# Patient Record
Sex: MALE | Race: WHITE | NOT HISPANIC OR LATINO | Employment: FULL TIME | URBAN - METROPOLITAN AREA
[De-identification: names, ages, dates, MRNs, and addresses within clinical notes are randomized per-mention and may not be internally consistent; named-entity substitution may affect disease eponyms.]

---

## 2017-03-19 ENCOUNTER — APPOINTMENT (OUTPATIENT)
Dept: RADIOLOGY | Facility: MEDICAL CENTER | Age: 57
DRG: 217 | End: 2017-03-19
Attending: EMERGENCY MEDICINE
Payer: MEDICAID

## 2017-03-19 ENCOUNTER — HOSPITAL ENCOUNTER (INPATIENT)
Facility: MEDICAL CENTER | Age: 57
LOS: 8 days | DRG: 217 | End: 2017-03-27
Attending: EMERGENCY MEDICINE | Admitting: INTERNAL MEDICINE
Payer: MEDICAID

## 2017-03-19 ENCOUNTER — RESOLUTE PROFESSIONAL BILLING HOSPITAL PROF FEE (OUTPATIENT)
Dept: MEDSURG UNIT | Facility: MEDICAL CENTER | Age: 57
End: 2017-03-19
Payer: MEDICAID

## 2017-03-19 DIAGNOSIS — I35.0 SEVERE AORTIC STENOSIS: ICD-10-CM

## 2017-03-19 PROBLEM — R55 SYNCOPE: Status: ACTIVE | Noted: 2017-03-19

## 2017-03-19 PROBLEM — I35.2 AORTIC INSUFFICIENCY WITH AORTIC STENOSIS: Status: ACTIVE | Noted: 2017-03-19

## 2017-03-19 PROBLEM — I71.9 AORTIC ANEURYSM (HCC): Status: ACTIVE | Noted: 2017-03-19

## 2017-03-19 LAB
ABO GROUP BLD: NORMAL
ABO GROUP BLD: NORMAL
ALBUMIN SERPL BCP-MCNC: 4.7 G/DL (ref 3.2–4.9)
ALBUMIN/GLOB SERPL: 1.7 G/DL
ALP SERPL-CCNC: 49 U/L (ref 30–99)
ALT SERPL-CCNC: 34 U/L (ref 2–50)
ANION GAP SERPL CALC-SCNC: 9 MMOL/L (ref 0–11.9)
APPEARANCE UR: CLEAR
AST SERPL-CCNC: 24 U/L (ref 12–45)
BILIRUB SERPL-MCNC: 0.9 MG/DL (ref 0.1–1.5)
BILIRUB UR QL STRIP.AUTO: NEGATIVE
BLD GP AB SCN SERPL QL: NORMAL
BUN SERPL-MCNC: 18 MG/DL (ref 8–22)
CALCIUM SERPL-MCNC: 9.5 MG/DL (ref 8.5–10.5)
CHLORIDE SERPL-SCNC: 105 MMOL/L (ref 96–112)
CO2 SERPL-SCNC: 25 MMOL/L (ref 20–33)
COLOR UR: NORMAL
CREAT SERPL-MCNC: 1.01 MG/DL (ref 0.5–1.4)
EKG IMPRESSION: NORMAL
ERYTHROCYTE [DISTWIDTH] IN BLOOD BY AUTOMATED COUNT: 35.7 FL (ref 35.9–50)
ETHANOL BLD-MCNC: 0 G/DL
GFR SERPL CREATININE-BSD FRML MDRD: >60 ML/MIN/1.73 M 2
GLOBULIN SER CALC-MCNC: 2.8 G/DL (ref 1.9–3.5)
GLUCOSE SERPL-MCNC: 119 MG/DL (ref 65–99)
GLUCOSE UR STRIP.AUTO-MCNC: NEGATIVE MG/DL
HCT VFR BLD AUTO: 49.4 % (ref 42–52)
HGB BLD-MCNC: 16.7 G/DL (ref 14–18)
KETONES UR STRIP.AUTO-MCNC: NEGATIVE MG/DL
LEUKOCYTE ESTERASE UR QL STRIP.AUTO: NEGATIVE
LV EJECT FRACT  99904: 55
LV EJECT FRACT MOD 2C 99903: 51.5
LV EJECT FRACT MOD 4C 99902: 56.1
LV EJECT FRACT MOD BP 99901: 53.96
MAGNESIUM SERPL-MCNC: 1.9 MG/DL (ref 1.5–2.5)
MCH RBC QN AUTO: 28.1 PG (ref 27–33)
MCHC RBC AUTO-ENTMCNC: 33.8 G/DL (ref 33.7–35.3)
MCV RBC AUTO: 83.2 FL (ref 81.4–97.8)
MICRO URNS: NORMAL
NITRITE UR QL STRIP.AUTO: NEGATIVE
PH UR STRIP.AUTO: 7.5 [PH]
PHOSPHATE SERPL-MCNC: 3.2 MG/DL (ref 2.5–4.5)
PLATELET # BLD AUTO: 173 K/UL (ref 164–446)
PMV BLD AUTO: 10.8 FL (ref 9–12.9)
POTASSIUM SERPL-SCNC: 3.9 MMOL/L (ref 3.6–5.5)
PROT SERPL-MCNC: 7.5 G/DL (ref 6–8.2)
PROT UR QL STRIP: NEGATIVE MG/DL
RBC # BLD AUTO: 5.94 M/UL (ref 4.7–6.1)
RBC UR QL AUTO: NEGATIVE
RH BLD: NORMAL
SODIUM SERPL-SCNC: 139 MMOL/L (ref 135–145)
SP GR UR STRIP.AUTO: 1.02
TROPONIN I SERPL-MCNC: 0.02 NG/ML (ref 0–0.04)
TSH SERPL DL<=0.005 MIU/L-ACNC: 1.64 UIU/ML (ref 0.3–3.7)
WBC # BLD AUTO: 5.7 K/UL (ref 4.8–10.8)

## 2017-03-19 PROCEDURE — 700117 HCHG RX CONTRAST REV CODE 255: Performed by: EMERGENCY MEDICINE

## 2017-03-19 PROCEDURE — 80053 COMPREHEN METABOLIC PANEL: CPT

## 2017-03-19 PROCEDURE — 72125 CT NECK SPINE W/O DYE: CPT

## 2017-03-19 PROCEDURE — 83735 ASSAY OF MAGNESIUM: CPT

## 2017-03-19 PROCEDURE — 86850 RBC ANTIBODY SCREEN: CPT

## 2017-03-19 PROCEDURE — 71260 CT THORAX DX C+: CPT

## 2017-03-19 PROCEDURE — 36415 COLL VENOUS BLD VENIPUNCTURE: CPT

## 2017-03-19 PROCEDURE — 86900 BLOOD TYPING SEROLOGIC ABO: CPT

## 2017-03-19 PROCEDURE — A9270 NON-COVERED ITEM OR SERVICE: HCPCS | Performed by: STUDENT IN AN ORGANIZED HEALTH CARE EDUCATION/TRAINING PROGRAM

## 2017-03-19 PROCEDURE — 72131 CT LUMBAR SPINE W/O DYE: CPT

## 2017-03-19 PROCEDURE — 93306 TTE W/DOPPLER COMPLETE: CPT

## 2017-03-19 PROCEDURE — 70450 CT HEAD/BRAIN W/O DYE: CPT

## 2017-03-19 PROCEDURE — 770020 HCHG ROOM/CARE - TELE (206)

## 2017-03-19 PROCEDURE — 80307 DRUG TEST PRSMV CHEM ANLYZR: CPT

## 2017-03-19 PROCEDURE — 99285 EMERGENCY DEPT VISIT HI MDM: CPT

## 2017-03-19 PROCEDURE — 93306 TTE W/DOPPLER COMPLETE: CPT | Mod: 26 | Performed by: INTERNAL MEDICINE

## 2017-03-19 PROCEDURE — 86901 BLOOD TYPING SEROLOGIC RH(D): CPT

## 2017-03-19 PROCEDURE — 700102 HCHG RX REV CODE 250 W/ 637 OVERRIDE(OP): Performed by: STUDENT IN AN ORGANIZED HEALTH CARE EDUCATION/TRAINING PROGRAM

## 2017-03-19 PROCEDURE — 81003 URINALYSIS AUTO W/O SCOPE: CPT

## 2017-03-19 PROCEDURE — 700105 HCHG RX REV CODE 258: Performed by: STUDENT IN AN ORGANIZED HEALTH CARE EDUCATION/TRAINING PROGRAM

## 2017-03-19 PROCEDURE — 84484 ASSAY OF TROPONIN QUANT: CPT

## 2017-03-19 PROCEDURE — 84443 ASSAY THYROID STIM HORMONE: CPT

## 2017-03-19 PROCEDURE — 72128 CT CHEST SPINE W/O DYE: CPT

## 2017-03-19 PROCEDURE — 84100 ASSAY OF PHOSPHORUS: CPT

## 2017-03-19 PROCEDURE — 305948 HCHG GREEN TRAUMA ACT PRE-NOTIFY NO CC

## 2017-03-19 PROCEDURE — 85027 COMPLETE CBC AUTOMATED: CPT

## 2017-03-19 PROCEDURE — 71010 DX-CHEST-LIMITED (1 VIEW): CPT

## 2017-03-19 PROCEDURE — 93005 ELECTROCARDIOGRAM TRACING: CPT | Performed by: EMERGENCY MEDICINE

## 2017-03-19 RX ORDER — ACETAMINOPHEN 325 MG/1
650 TABLET ORAL EVERY 6 HOURS PRN
Status: DISCONTINUED | OUTPATIENT
Start: 2017-03-19 | End: 2017-03-22

## 2017-03-19 RX ORDER — BISACODYL 10 MG
10 SUPPOSITORY, RECTAL RECTAL
Status: DISCONTINUED | OUTPATIENT
Start: 2017-03-20 | End: 2017-03-22

## 2017-03-19 RX ORDER — POLYETHYLENE GLYCOL 3350 17 G/17G
1 POWDER, FOR SOLUTION ORAL
Status: DISCONTINUED | OUTPATIENT
Start: 2017-03-20 | End: 2017-03-22

## 2017-03-19 RX ORDER — AMOXICILLIN 250 MG
2 CAPSULE ORAL 2 TIMES DAILY
Status: DISCONTINUED | OUTPATIENT
Start: 2017-03-20 | End: 2017-03-22

## 2017-03-19 RX ORDER — SODIUM CHLORIDE 9 MG/ML
INJECTION, SOLUTION INTRAVENOUS CONTINUOUS
Status: DISCONTINUED | OUTPATIENT
Start: 2017-03-19 | End: 2017-03-22

## 2017-03-19 RX ORDER — DEXTROSE MONOHYDRATE 25 G/50ML
25 INJECTION, SOLUTION INTRAVENOUS
Status: DISCONTINUED | OUTPATIENT
Start: 2017-03-19 | End: 2017-03-22

## 2017-03-19 RX ADMIN — IOHEXOL 100 ML: 350 INJECTION, SOLUTION INTRAVENOUS at 11:12

## 2017-03-19 RX ADMIN — SODIUM CHLORIDE: 9 INJECTION, SOLUTION INTRAVENOUS at 15:09

## 2017-03-19 RX ADMIN — METOPROLOL TARTRATE 25 MG: 25 TABLET, FILM COATED ORAL at 15:09

## 2017-03-19 RX ADMIN — METOPROLOL TARTRATE 25 MG: 25 TABLET, FILM COATED ORAL at 21:02

## 2017-03-19 ASSESSMENT — PATIENT HEALTH QUESTIONNAIRE - PHQ9
SUM OF ALL RESPONSES TO PHQ QUESTIONS 1-9: 0
2. FEELING DOWN, DEPRESSED, IRRITABLE, OR HOPELESS: NOT AT ALL
SUM OF ALL RESPONSES TO PHQ9 QUESTIONS 1 AND 2: 0
1. LITTLE INTEREST OR PLEASURE IN DOING THINGS: NOT AT ALL

## 2017-03-19 ASSESSMENT — LIFESTYLE VARIABLES
TOTAL SCORE: 0
ON A TYPICAL DAY WHEN YOU DRINK ALCOHOL HOW MANY DRINKS DO YOU HAVE: 0
EVER HAD A DRINK FIRST THING IN THE MORNING TO STEADY YOUR NERVES TO GET RID OF A HANGOVER: NO
HAVE YOU EVER FELT YOU SHOULD CUT DOWN ON YOUR DRINKING: NO
EVER FELT BAD OR GUILTY ABOUT YOUR DRINKING: NO
HAVE PEOPLE ANNOYED YOU BY CRITICIZING YOUR DRINKING: NO
TOTAL SCORE: 0
EVER_SMOKED: NEVER
AVERAGE NUMBER OF DAYS PER WEEK YOU HAVE A DRINK CONTAINING ALCOHOL: 1
ALCOHOL_USE: YES
HOW MANY TIMES IN THE PAST YEAR HAVE YOU HAD 5 OR MORE DRINKS IN A DAY: 0
CONSUMPTION TOTAL: NEGATIVE
TOTAL SCORE: 0

## 2017-03-19 ASSESSMENT — PAIN SCALES - GENERAL
PAINLEVEL_OUTOF10: 0
PAINLEVEL_OUTOF10: 0
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
## 2017-03-19 NOTE — IP AVS SNAPSHOT
" <p align=\"LEFT\"><IMG SRC=\"//[email or web address]$/[email or web address]\" alt=\"Image\" WIDTH=\"50%\" HEIGHT=\"200\" BORDER=\"\"></p>                   Name:Felx Perrin  Medical Record Number:[id number]  CSN: [id number]    YOB: 1960   Age: 56 y.o.  Sex: male  HT:1.778 m (5' 10\") WT: 100 kg (220 lb 7.4 oz)          Admit Date: 3/19/2017     Discharge Date:   Today's Date: 3/27/2017  Attending Doctor:  [facility] [place]*                  Allergies:  Review of patient's allergies indicates no known allergies.          Your appointments     Mar 28, 2017  3:15 PM   Anti-Coag New Patient with [facility] PAV PHARMACIST   [facility]    [address]  [address]  [facility] [id number] [phone]            Apr 04, 2017  9:00 AM   Established Patient with Delmar Salcedo M.D.   [facility] (--)    [address]  [address]  [facility] [address] [phone]           You will be receiving a confirmation call a few days before your appointment from our automated call confirmation system.            Apr 04, 2017 11:20 AM   HOSPITAL FOLLOW UP with Kendell Wilson M.D.   [facility]    [address] [address]  [address] [facility] [id number] [phone]            Jun 26, 2017  3:40 PM   Initial Visit with Michael J Bloch, M.D., [facility] EXAM 1   [facility]  (--)    [address] [facility] [address] [phone]              Follow-up Information     1. Follow up with Janel White M.D. On 4/17/2017.    Specialty:  Cardiac Surgery    Why:  1:00 pm    Contact information    [address]  [address]  [facility] [address] [phone]          2. Follow up with Pcp Pt States None.    Specialty:  Family Medicine        3. Follow up with Pcp Pt States None.    Specialty:  Family Medicine        4. Go to Delmar Salcedo M.D..    Specialty: "  Internal Medicine    Why:  for PCP establishmenet    Contact information    [address]  [address]  [place] [address]  [phone]           Medication List      Take these Medications        Instructions    aspirin 81 MG EC tablet    Take 1 Tab by mouth every day.   Dose:  81 mg       atorvastatin 40 MG Tabs   Commonly known as:  LIPITOR    Take 1 Tab by mouth every bedtime.   Dose:  40 mg       Cholecalciferol 2000 UNIT Tabs    Take 1 Tab by mouth every day.   Dose:  2000 Units        MG Caps    Take 100 mg by mouth every morning.   Dose:  100 mg       furosemide 40 MG Tabs   Commonly known as:  LASIX    Take 1 Tab by mouth every day.   Dose:  40 mg       oxycodone immediate-release 5 MG Tabs   Commonly known as:  ROXICODONE    Take 1 Tab by mouth every 3 hours as needed (Moderate Pain (NRS Pain Scale 4-6; CPOT Pain Scale 3-5)).   Dose:  5 mg       potassium chloride SA 20 MEQ Tbcr   Commonly known as:  Kdur    Take 1 Tab by mouth every day.   Dose:  20 mEq       warfarin 7.5 MG Tabs   Commonly known as:  COUMADIN    Take 1 Tab by mouth COUMADIN-DAILY.   Dose:  7.5 mg

## 2017-03-19 NOTE — IP AVS SNAPSHOT
3/27/2017          Flex Perrin  [address]  [place] [address]    Dear Flex:    [facility] wants to ensure your discharge home is safe and you or your loved ones have had all your questions answered regarding your care after you leave the hospital.    You may receive a telephone call within two days of your discharge.  This call is to make certain you understand your discharge instructions as well as ensure we provided you with the best care possible during your stay with us.     The call will only last approximately 3-5 minutes and will be done by a nurse.    Once again, we want to ensure your discharge home is safe and that you have a clear understanding of any next steps in your care.  If you have any questions or concerns, please do not hesitate to contact us, we are here for you.  Thank you for choosing [facility] for your healthcare needs.    Sincerely,    Monatna Herrera    [facility]
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
## 2017-03-19 NOTE — PROGRESS NOTES
Pt arrived to floor, Tele monitor in place, verified with monitor room, VSS, no c/o pain, A&Ox4, ambulates independently, no strip alarm needed, Pt up to chair, call light within reach, all questions answered.
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
## 2017-03-19 NOTE — IP AVS SNAPSHOT
" Home Care Instructions                                                                                                                  Name:Flex Perrin  Medical Record Number:[id number]  CSN: [id number]    YOB: 1960   Age: 56 y.o.  Sex: male  HT:1.778 m (5' 10\") WT: 100 kg (220 lb 7.4 oz)          Admit Date: 3/19/2017     Discharge Date:   Today's Date: 3/27/2017  Attending Doctor:  [facility] [place]*                  Allergies:  Review of patient's allergies indicates no known allergies.            Discharge Instructions       Discharge Instructions    Discharged to home by car with relative. Discharged via wheelchair, hospital escort: Yes.  Special equipment needed: Not Applicable    Be sure to schedule a follow-up appointment with your primary care doctor or any specialists as instructed.     Discharge Plan:   Influenza Vaccine Indication: Patient Refuses    I understand that a diet low in cholesterol, fat, and sodium is recommended for good health. Unless I have been given specific instructions below for another diet, I accept this instruction as my diet prescription.   Other diet: cardiac    Special Instructions: heart surgery and coumadin    Cardiac Surgery Discharge Instructions/[facility]    Activity:  1. NO driving for 4 weeks after surgery. You may ride as a passenger.  2. NO lifting of any item over 10 lbs (e.g. gallon of milk) for 6 weeks after surgery.  Do not raise both arms above head, only one at a time.  Do not push or pull with your arms.  3. Walk as much as possible! Walk a minimum of 4 times per day. Depending on your fatigue and comfort level, you may walk as much as you wish. There is no maximum.  4. Other physical activities (sex, housework, gardening, etc.) are OK after 4 weeks or after 8 weeks if you want to golf (start by putting, then advance to chipping, then to driving).  5. Continue using incentive spirometer for 2 weeks.  If you are going home on oxygen and " you were not on oxygen prior to surgery, keep using until you are oxygen free.  6. Weigh daily and write it down starting  the next morning after you arrive home. Call your doctor for a weight gain of 2-4 lbs in 1-2 days.    Incision Care:  1. SHOWER EVERYDAY-no baths. Make sure to clean your incision(s) twice daily with plain, perfume and dye-free soap (if you shower in the morning, stand at the sink at bedtime and clean incision with soap and water). Then pat incision(s) dry with clean towel. Avoid creams or lotions on the incision(s).    2. If there is any increase in redness or swelling, or separation of the incision line, or thick drainage* from any of the incisions, call [facility] ([phone]). * Clear, thin drainage is not abnormal especially from the leg incision and/or chest tube sites.                    3. Continue to wear your BALJEET Stockings for 4 weeks on the leg(s) with the incision(s) or swelling. You may take off the stocking(s) when in bed or when the leg(s) are elevated.    General Instructions:  1. If you are on the blood thinner Coumadin (warfarin), you will need your coumadin levels checked periodically.  2. You have been referred to Cardiac Rehab which is highly recommended for you after heart surgery. You can start Cardiac Rehab 30 days after surgery.  If you do not have an appointment at the time of discharge call [phone] to schedule an appointment.  3. Your Primary Care Doctor typically handles Home Oxygen. The Home Oxygen service that drops off your tanks should be checking your oxygen saturation levels. Oxygen may be stopped when you are > 90 % saturated on room air. Check with your Primary Care Doctor if you are unsure.    Prescription Refills/Questions:   Call your usual Pharmacy for ALL refills   1. Pain medication questions only: Call [facility] at [phone].  (Remember that refills may require additional physician approval and will need at least 24 hours’  notice. Please call for refills on Mondays through Fridays from 9 am to 4 pm).  2. For all other medications (except pain medication): Call your Cardiology Group or Primary Care Doctor (not [facility]) for refills or questions.    [facility] IS ALWAYS AVAILABLE TO ANSWER YOUR QUESTIONS. DO NOT HESITATE TO CALL!    · Is patient discharged on Warfarin / Coumadin?   Yes    You are on the blood thinner Coumadin (warfarin) and you will need your coumadin levels checked periodically. For any questions call the [facility] @ [phone]. The home health nurse will draw your blood and the coumadin clinic will call with any change to your dose.      IMPORTANT: HOW TO USE THIS INFORMATION:  This is a summary and does NOT have all possible information about this product. This information does not assure that this product is safe, effective, or appropriate for you. This information is not individual medical advice and does not substitute for the advice of your health care professional. Always ask your health care professional for complete information about this product and your specific health needs.      WARFARIN - ORAL (WARF-uh-rin)      COMMON BRAND NAME(S): Coumadin      WARNING:  Warfarin can cause very serious (possibly fatal) bleeding. This is more likely to occur when you first start taking this medication or if you take too much warfarin. To decrease your risk for bleeding, your doctor or other health care provider will monitor you closely and check your lab results (INR test) to make sure you are not taking too much warfarin. Keep all medical and laboratory appointments. Tell your doctor right away if you notice any signs of serious bleeding. See also Side Effects section.      USES:  This medication is used to treat blood clots (such as in deep vein thrombosis-DVT or pulmonary embolus-PE) and/or to prevent new clots from forming in your body. Preventing harmful blood clots helps to  "reduce the risk of a stroke or heart attack. Conditions that increase your risk of developing blood clots include a certain type of irregular heart rhythm (atrial fibrillation), heart valve replacement, recent heart attack, and certain surgeries (such as hip/knee replacement). Warfarin is commonly called a \"blood thinner,\" but the more correct term is \"anticoagulant.\" It helps to keep blood flowing smoothly in your body by decreasing the amount of certain substances (clotting proteins) in your blood.      HOW TO USE:  Read the Medication Guide provided by your pharmacist before you start taking warfarin and each time you get a refill. If you have any questions, ask your doctor or pharmacist. Take this medication by mouth with or without food as directed by your doctor or other health care professional, usually once a day. It is very important to take it exactly as directed. Do not increase the dose, take it more frequently, or stop using it unless directed by your doctor. Dosage is based on your medical condition, laboratory tests (such as INR), and response to treatment. Your doctor or other health care provider will monitor you closely while you are taking this medication to determine the right dose for you. Use this medication regularly to get the most benefit from it. To help you remember, take it at the same time each day. It is important to eat a balanced, consistent diet while taking warfarin. Some foods can affect how warfarin works in your body and may affect your treatment and dose. Avoid sudden large increases or decreases in your intake of foods high in vitamin K (such as broccoli, cauliflower, cabbage, brussels sprouts, kale, spinach, and other green leafy vegetables, liver, green tea, certain vitamin supplements). If you are trying to lose weight, check with your doctor before you try to go on a diet. Cranberry products may also affect how your warfarin works. Limit the amount of cranberry juice (16 " ounces/480 milliliters a day) or other cranberry products you may drink or eat.      SIDE EFFECTS:  Nausea, loss of appetite, or stomach/abdominal pain may occur. If any of these effects persist or worsen, tell your doctor or pharmacist promptly. Remember that your doctor has prescribed this medication because he or she has judged that the benefit to you is greater than the risk of side effects. Many people using this medication do not have serious side effects. This medication can cause serious bleeding if it affects your blood clotting proteins too much (shown by unusually high INR lab results). Even if your doctor stops your medication, this risk of bleeding can continue for up to a week. Tell your doctor right away if you have any signs of serious bleeding, including: unusual pain/swelling/discomfort, unusual/easy bruising, prolonged bleeding from cuts or gums, persistent/frequent nosebleeds, unusually heavy/prolonged menstrual flow, pink/dark urine, coughing up blood, vomit that is bloody or looks like coffee grounds, severe headache, dizziness/fainting, unusual or persistent tiredness/weakness, bloody/black/tarry stools, chest pain, shortness of breath, difficulty swallowing. Tell your doctor right away if any of these unlikely but serious side effects occur: persistent nausea/vomiting, severe stomach/abdominal pain, yellowing eyes/skin. This drug rarely has caused very serious (possibly fatal) problems if its effects lead to small blood clots (usually at the beginning of treatment). This can lead to severe skin/tissue damage that may require surgery or amputation if left untreated. Patients with certain blood conditions (protein C or S deficiency) may be at greater risk. Get medical help right away if any of these rare but serious side effects occur: painful/red/purplish patches on the skin (such as on the toe, breast, abdomen), change in the amount of urine, vision changes, confusion, slurred speech,  weakness on one side of the body. A very serious allergic reaction to this drug is rare. However, get medical help right away if you notice any symptoms of a serious allergic reaction, including: rash, itching/swelling (especially of the face/tongue/throat), severe dizziness, trouble breathing. This is not a complete list of possible side effects. If you notice other effects not listed above, contact your doctor or pharmacist. In the [place] - Call your doctor for medical advice about side effects. You may report side effects to FDA at 4-855-CIM-5534. In [place] - Call your doctor for medical advice about side effects. You may report side effects to [facility] [place] at 1-984.490.5506.      PRECAUTIONS:  Before taking warfarin, tell your doctor or pharmacist if you are allergic to it; or if you have any other allergies. This product may contain inactive ingredients, which can cause allergic reactions or other problems. Talk to your pharmacist for more details. Before using this medication, tell your doctor or pharmacist your medical history, especially of: blood disorders (such as anemia, hemophilia), bleeding problems (such as bleeding of the stomach/intestines, bleeding in the brain), blood vessel disorders (such as aneurysms), recent major injury/surgery, liver disease, alcohol use, mental/mood disorders (including memory problems), frequent falls/injuries. It is important that all your doctors and dentists know that you take warfarin. Before having surgery or any medical/dental procedures, tell your doctor or dentist that you are taking this medication and about all the products you use (including prescription drugs, nonprescription drugs, and herbal products). Avoid getting injections into the muscles. If you must have an injection into a muscle (for example, a flu shot), it should be given in the arm. This way, it will be easier to check for bleeding and/or apply pressure bandages. This medication may cause stomach  bleeding. Daily use of alcohol while using this medicine will increase your risk for stomach bleeding and may also affect how this medication works. Limit or avoid alcoholic beverages. If you have not been eating well, if you have an illness or infection that causes fever, vomiting, or diarrhea for more than 2 days, or if you start using any antibiotic medications, contact your doctor or pharmacist immediately because these conditions can affect how warfarin works. This medication can cause heavy bleeding. To lower the chance of getting cut, bruised, or injured, use great caution with sharp objects like safety razors and nail cutters. Use an electric razor when shaving and a soft toothbrush when brushing your teeth. Avoid activities such as contact sports. If you fall or injure yourself, especially if you hit your head, call your doctor immediately. Your doctor may need to check you. The Food & Drug Administration has stated that generic warfarin products are interchangeable. However, consult your doctor or pharmacist before switching warfarin products. Be careful not to take more than one medication that contains warfarin unless specifically directed by the doctor or health care provider who is monitoring your warfarin treatment. Older adults may be at greater risk for bleeding while using this drug. This medication is not recommended for use during pregnancy because of serious (possibly fatal) harm to an unborn baby. Discuss the use of reliable forms of birth control with your doctor. If you become pregnant or think you may be pregnant, tell your doctor immediately. If you are planning pregnancy, discuss a plan for managing your condition with your doctor before you become pregnant. Your doctor may switch the type of medication you use during pregnancy. Very small amounts of this medication may pass into breast milk but is unlikely to harm a nursing infant. Consult your doctor before breast-feeding.      DRUG  "INTERACTIONS:  Drug interactions may change how your medications work or increase your risk for serious side effects. This document does not contain all possible drug interactions. Keep a list of all the products you use (including prescription/nonprescription drugs and herbal products) and share it with your doctor and pharmacist. Do not start, stop, or change the dosage of any medicines without your doctor's approval. Warfarin interacts with many prescription, nonprescription, vitamin, and herbal products. This includes medications that are applied to the skin or inside the vagina or rectum. The interactions with warfarin usually result in an increase or decrease in the \"blood-thinning\" (anticoagulant) effect. Your doctor or other health care professional should closely monitor you to prevent serious bleeding or clotting problems. While taking warfarin, it is very important to tell your doctor or pharmacist of any changes in medications, vitamins, or herbal products that you are taking. Some products that may interact with this drug include: capecitabine, imatinib, mifepristone. Aspirin, aspirin-like drugs (salicylates), and nonsteroidal anti-inflammatory drugs (NSAIDs such as ibuprofen, naproxen, celecoxib) may have effects similar to warfarin. These drugs may increase the risk of bleeding problems if taken during treatment with warfarin. Carefully check all prescription/nonprescription product labels (including drugs applied to the skin such as pain-relieving creams) since the products may contain NSAIDs or salicylates. Talk to your doctor about using a different medication (such as acetaminophen) to treat pain/fever. Low-dose aspirin and related drugs (such as clopidogrel, ticlopidine) should be continued if prescribed by your doctor for specific medical reasons such as heart attack or stroke prevention. Consult your doctor or pharmacist for more details. Many herbal products interact with warfarin. Tell your " doctor before taking any herbal products, especially bromelains, coenzyme Q10, cranberry, danshen, dong quai, fenugreek, garlic, ginkgo biloba, ginseng, and Jacqueline's wort, among others. This medication may interfere with a certain laboratory test to measure theophylline levels, possibly causing false test results. Make sure laboratory personnel and all your doctors know you use this drug.      OVERDOSE:  If overdose is suspected, contact a poison control center or emergency room immediately. [place] residents can call the  National Poison Hotline at 1-244.156.8085. [place] residents can call a provincial poison control center. Symptoms of overdose may include: bloody/black/tarry stools, pink/dark urine, unusual/prolonged bleeding.      NOTES:  Do not share this medication with others. Laboratory and/or medical tests (such as INR, complete blood count) must be performed periodically to monitor your progress or check for side effects. Consult your doctor for more details.      MISSED DOSE:  For the best possible benefit, do not miss any doses. If you do miss a dose and remember on the same day, take it as soon as you remember. If you remember on the next day, skip the missed dose and resume your usual dosing schedule. Do not double the dose to catch up because this could increase your risk for bleeding. Keep a record of missed doses to give to your doctor or pharmacist. Contact your doctor or pharmacist if you miss 2 or more doses in a row.      STORAGE:  Store at room temperature away from light and moisture. Do not store in the bathroom. Keep all medications away from children and pets. Do not flush medications down the toilet or pour them into a drain unless instructed to do so. Properly discard this product when it is  or no longer needed. Consult your pharmacist or local waste disposal company for more details about how to safely discard your product.      MEDICAL ALERT:  Your condition and medication can  cause complications in a medical emergency. For information about enrolling in MedicAlert, call 1-280.726.3983 ([place]) or 1-110.384.8779 ([place]).      Information last revised October 2010 Copyright(c) 2010 [employer]      · Is patient Post Blood Transfusion?  No    Depression / Suicide Risk    As you are discharged from this [facility] facility, it is important to learn how to keep safe from harming yourself.    Recognize the warning signs:  · Abrupt changes in personality, positive or negative- including increase in energy   · Giving away possessions  · Change in eating patterns- significant weight changes-  positive or negative  · Change in sleeping patterns- unable to sleep or sleeping all the time   · Unwillingness or inability to communicate  · Depression  · Unusual sadness, discouragement and loneliness  · Talk of wanting to die  · Neglect of personal appearance   · Rebelliousness- reckless behavior  · Withdrawal from people/activities they love  · Confusion- inability to concentrate     If you or a loved one observes any of these behaviors or has concerns about self-harm, here's what you can do:  · Talk about it- your feelings and reasons for harming yourself  · Remove any means that you might use to hurt yourself (examples: pills, rope, extension cords, firearm)  · Get professional help from the community (Mental Health, Substance Abuse, psychological counseling)  · Do not be alone:Call your Safe Contact- someone whom you trust who will be there for you.  · Call your local CRISIS HOTLINE 955-3194 or 106-802-5581  · Call your local Children's Mobile Crisis Response Team [place] (261) 463-9943 or www.[employer]  · Call the toll free National Suicide Prevention Hotlines   · National Suicide Prevention Lifeline 886-737-PJYM (6214)  · National Hope Line Network 800-SUICIDE (387-1399)        Your appointments     Mar 28, 2017  3:15 PM   Anti-Coag New Patient with [facility] PHARMACIST      [facility]    [address]  [address]  [facility] [address] [phone]            Apr 04, 2017  9:00 AM   Established Patient with Delmar Salcedo M.D.   [facility] - Internal Medicine (--)    [address]  [address]  [facility] [id number] [phone]           You will be receiving a confirmation call a few days before your appointment from our automated call confirmation system.            Apr 04, 2017 11:20 AM   HOSPITAL FOLLOW UP with Kendell Wilson M.D.   [facility] (--)    [address] [address]  [address]nd [address]  [facility] [address] [phone]            Jun 26, 2017  3:40 PM   Initial Visit with Michael J Bloch, M.D., IH EXAM 1   [facility]  (--)    [address] [facility] [address]   [phone]              Follow-up Information     1. Follow up with Janel White M.D. On 4/17/2017.    Specialty:  Cardiac Surgery    Why:  1:00 pm    Contact information    [address]  [facility] [address]  [phone]          2. Follow up with Pcp [facility] [place] None.    Specialty:  Family Medicine        3. Follow up with Pcp [facility] [place] None.    Specialty:  Family Medicine        4. Go to Delmar Salcedo M.D..    Specialty:  Internal Medicine    Why:  for PCP establishmenet    Contact information    [address]  [address]  [facility] [id number] [phone]           Discharge Medication Instructions:    Below are the medications your physician expects you to take upon discharge:    Review all your home medications and newly ordered medications with your doctor and/or pharmacist. Follow medication instructions as directed by your doctor and/or pharmacist.    Please keep your medication list with you and share with your physician.               Medication List      START taking these medications        Instructions    aspirin  81 MG EC tablet   Last time this was given:  81 mg on 3/27/2017  8:31 AM    Take 1 Tab by mouth every day.   Dose:  81 mg       atorvastatin 40 MG Tabs   Last time this was given:  40 mg on 3/26/2017  8:19 PM   Commonly known as:  LIPITOR    Take 1 Tab by mouth every bedtime.   Dose:  40 mg       Cholecalciferol 2000 UNIT Tabs   Last time this was given:  2,000 Units on 3/27/2017  8:31 AM    Take 1 Tab by mouth every day.   Dose:  2000 Units        MG Caps   Last time this was given:  100 mg on 3/26/2017  8:02 AM    Take 100 mg by mouth every morning.   Dose:  100 mg       furosemide 40 MG Tabs   Commonly known as:  LASIX    Take 1 Tab by mouth every day.   Dose:  40 mg       oxycodone immediate-release 5 MG Tabs   Last time this was given:  5 mg on 3/27/2017  4:19 AM   Commonly known as:  ROXICODONE    Take 1 Tab by mouth every 3 hours as needed (Moderate Pain (NRS Pain Scale 4-6; CPOT Pain Scale 3-5)).   Dose:  5 mg       potassium chloride SA 20 MEQ Tbcr   Last time this was given:  20 mEq on 3/27/2017  8:31 AM   Commonly known as:  Kdur    Take 1 Tab by mouth every day.   Dose:  20 mEq       warfarin 7.5 MG Tabs   Last time this was given:  7.5 mg on 3/26/2017  5:35 PM   Commonly known as:  COUMADIN    Take 1 Tab by mouth COUMADIN-DAILY.   Dose:  7.5 mg               Instructions           Diet / Nutrition:    Follow any diet instructions given to you by your doctor or the dietician, including how much salt (sodium) you are allowed each day.    If you are overweight, talk to your doctor about a weight reduction plan.    Activity:    Remain physically active following your doctor's instructions about exercise and activity.    Rest often.     Any time you become even a little tired or short of breath, SIT DOWN and rest.    Worsening Symptoms:    Report any of the following signs and symptoms to the doctor's office immediately:    *Pain of jaw, arm, or neck  *Chest pain not relieved by medication                                *Dizziness or loss of consciousness  *Difficulty breathing even when at rest   *More tired than usual                                       *Bleeding drainage or swelling of surgical site  *Swelling of feet, ankles, legs or stomach                 *Fever (>100ºF)  *Pink or blood tinged sputum  *Weight gain (3lbs/day or 5lbs /week)           *Shock from internal defibrillator (if applicable)  *Palpitations or irregular heartbeats                *Cool and/or numb extremities    Stroke Awareness    Common Risk Factors for Stroke include:    Age  Atrial Fibrillation  Carotid Artery Stenosis  Diabetes Mellitus  Excessive alcohol consumption  High blood pressure  Overweight   Physical inactivity  Smoking    Warning signs and symptoms of a stroke include:    *Sudden numbness or weakness of the face, arm or leg (especially on one side of the body).  *Sudden confusion, trouble speaking or understanding.  *Sudden trouble seeing in one or both eyes.  *Sudden trouble walking, dizziness, loss of balance or coordination.Sudden severe headache with no known cause.    It is very important to get treatment quickly when a stroke occurs. If you experience any of the above warning signs, call 911 immediately.                   Disclaimer         Quit Smoking / Tobacco Use:    I understand the use of any tobacco products increases my chance of suffering from future heart disease or stroke and could cause other illnesses which may shorten my life. Quitting the use of tobacco products is the single most important thing I can do to improve my health. For further information on smoking / tobacco cessation call a Toll Free Quit Line at 1-882.166.9135 (*[employer]) or 1-374.756.4928 (American Lung Association) or you can access the web based program at www.lungusa.org.    [place] Tobacco Users Help Line:  (944) 258-7117       Toll Free: 1-152.839.1987  Quit Tobacco Program [facility]  [phone]    Crisis Hotline:    [place] Crisis Hotline:  4-768-VSOIARS or 1-482.623.6396    [place] Crisis Hotline:    1-922.951.9069 or 185-177-8605    Discharge Survey:   Thank you for choosing [facility]. We hope we did everything we could to make your hospital stay a pleasant one. You may be receiving a phone survey and we would appreciate your time and participation in answering the questions. Your input is very valuable to us in our efforts to improve our service to our patients and their families.        My signature on this form indicates that:    1. I have reviewed and understand the above information.  2. My questions regarding this information have been answered to my satisfaction.  3. I have formulated a plan with my discharge nurse to obtain my prescribed medications for home.                  Disclaimer         __________________________________                     __________       ________                       Patient Signature                                                 Date                    Time

## 2017-03-20 PROBLEM — I71.20 THORACIC AORTIC ANEURYSM WITHOUT RUPTURE (HCC): Status: ACTIVE | Noted: 2017-03-19
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
## 2017-03-20 LAB
ALBUMIN SERPL BCP-MCNC: 4 G/DL (ref 3.2–4.9)
ALBUMIN/GLOB SERPL: 1.9 G/DL
ALP SERPL-CCNC: 44 U/L (ref 30–99)
ALT SERPL-CCNC: 26 U/L (ref 2–50)
ANION GAP SERPL CALC-SCNC: 6 MMOL/L (ref 0–11.9)
AST SERPL-CCNC: 18 U/L (ref 12–45)
BASOPHILS # BLD AUTO: 1 % (ref 0–1.8)
BASOPHILS # BLD: 0.06 K/UL (ref 0–0.12)
BILIRUB SERPL-MCNC: 0.8 MG/DL (ref 0.1–1.5)
BUN SERPL-MCNC: 17 MG/DL (ref 8–22)
CALCIUM SERPL-MCNC: 8.7 MG/DL (ref 8.5–10.5)
CHLORIDE SERPL-SCNC: 109 MMOL/L (ref 96–112)
CHOLEST SERPL-MCNC: 105 MG/DL (ref 100–199)
CO2 SERPL-SCNC: 23 MMOL/L (ref 20–33)
CREAT SERPL-MCNC: 0.83 MG/DL (ref 0.5–1.4)
EKG IMPRESSION: NORMAL
EOSINOPHIL # BLD AUTO: 0.12 K/UL (ref 0–0.51)
EOSINOPHIL NFR BLD: 1.9 % (ref 0–6.9)
ERYTHROCYTE [DISTWIDTH] IN BLOOD BY AUTOMATED COUNT: 36.4 FL (ref 35.9–50)
EST. AVERAGE GLUCOSE BLD GHB EST-MCNC: 105 MG/DL
GFR SERPL CREATININE-BSD FRML MDRD: >60 ML/MIN/1.73 M 2
GLOBULIN SER CALC-MCNC: 2.1 G/DL (ref 1.9–3.5)
GLUCOSE SERPL-MCNC: 100 MG/DL (ref 65–99)
HBA1C MFR BLD: 5.3 % (ref 0–5.6)
HCT VFR BLD AUTO: 45.5 % (ref 42–52)
HDLC SERPL-MCNC: 22 MG/DL
HGB BLD-MCNC: 15.3 G/DL (ref 14–18)
IMM GRANULOCYTES # BLD AUTO: 0.02 K/UL (ref 0–0.11)
IMM GRANULOCYTES NFR BLD AUTO: 0.3 % (ref 0–0.9)
INR PPP: 1.17 (ref 0.87–1.13)
LDLC SERPL CALC-MCNC: 68 MG/DL
LYMPHOCYTES # BLD AUTO: 1.51 K/UL (ref 1–4.8)
LYMPHOCYTES NFR BLD: 24.3 % (ref 22–41)
MAGNESIUM SERPL-MCNC: 2.1 MG/DL (ref 1.5–2.5)
MCH RBC QN AUTO: 28.1 PG (ref 27–33)
MCHC RBC AUTO-ENTMCNC: 33.6 G/DL (ref 33.7–35.3)
MCV RBC AUTO: 83.5 FL (ref 81.4–97.8)
MONOCYTES # BLD AUTO: 0.53 K/UL (ref 0–0.85)
MONOCYTES NFR BLD AUTO: 8.5 % (ref 0–13.4)
NEUTROPHILS # BLD AUTO: 3.98 K/UL (ref 1.82–7.42)
NEUTROPHILS NFR BLD: 64 % (ref 44–72)
NRBC # BLD AUTO: 0 K/UL
NRBC BLD AUTO-RTO: 0 /100 WBC
PLATELET # BLD AUTO: 164 K/UL (ref 164–446)
PMV BLD AUTO: 11.2 FL (ref 9–12.9)
POTASSIUM SERPL-SCNC: 4 MMOL/L (ref 3.6–5.5)
PROT SERPL-MCNC: 6.1 G/DL (ref 6–8.2)
PROTHROMBIN TIME: 15.3 SEC (ref 12–14.6)
RBC # BLD AUTO: 5.45 M/UL (ref 4.7–6.1)
SODIUM SERPL-SCNC: 138 MMOL/L (ref 135–145)
TRIGL SERPL-MCNC: 76 MG/DL (ref 0–149)
WBC # BLD AUTO: 6.2 K/UL (ref 4.8–10.8)

## 2017-03-20 PROCEDURE — 770020 HCHG ROOM/CARE - TELE (206)

## 2017-03-20 PROCEDURE — 36415 COLL VENOUS BLD VENIPUNCTURE: CPT

## 2017-03-20 PROCEDURE — 83735 ASSAY OF MAGNESIUM: CPT

## 2017-03-20 PROCEDURE — C1769 GUIDE WIRE: HCPCS

## 2017-03-20 PROCEDURE — 700101 HCHG RX REV CODE 250

## 2017-03-20 PROCEDURE — 307093 HCHG TR BAND RADIAL

## 2017-03-20 PROCEDURE — 4A023N7 MEASUREMENT OF CARDIAC SAMPLING AND PRESSURE, LEFT HEART, PERCUTANEOUS APPROACH: ICD-10-PCS | Performed by: INTERNAL MEDICINE

## 2017-03-20 PROCEDURE — 83036 HEMOGLOBIN GLYCOSYLATED A1C: CPT

## 2017-03-20 PROCEDURE — 99223 1ST HOSP IP/OBS HIGH 75: CPT | Mod: GC | Performed by: INTERNAL MEDICINE

## 2017-03-20 PROCEDURE — 700111 HCHG RX REV CODE 636 W/ 250 OVERRIDE (IP): Performed by: STUDENT IN AN ORGANIZED HEALTH CARE EDUCATION/TRAINING PROGRAM

## 2017-03-20 PROCEDURE — 700105 HCHG RX REV CODE 258: Performed by: INTERNAL MEDICINE

## 2017-03-20 PROCEDURE — 85025 COMPLETE CBC W/AUTO DIFF WBC: CPT

## 2017-03-20 PROCEDURE — 80053 COMPREHEN METABOLIC PANEL: CPT

## 2017-03-20 PROCEDURE — 360979 HCHG DIAGNOSTIC CATH

## 2017-03-20 PROCEDURE — 99152 MOD SED SAME PHYS/QHP 5/>YRS: CPT

## 2017-03-20 PROCEDURE — 700105 HCHG RX REV CODE 258: Performed by: STUDENT IN AN ORGANIZED HEALTH CARE EDUCATION/TRAINING PROGRAM

## 2017-03-20 PROCEDURE — 700117 HCHG RX CONTRAST REV CODE 255: Performed by: INTERNAL MEDICINE

## 2017-03-20 PROCEDURE — 93005 ELECTROCARDIOGRAM TRACING: CPT | Performed by: STUDENT IN AN ORGANIZED HEALTH CARE EDUCATION/TRAINING PROGRAM

## 2017-03-20 PROCEDURE — 700111 HCHG RX REV CODE 636 W/ 250 OVERRIDE (IP)

## 2017-03-20 PROCEDURE — B3101ZZ FLUOROSCOPY OF THORACIC AORTA USING LOW OSMOLAR CONTRAST: ICD-10-PCS | Performed by: INTERNAL MEDICINE

## 2017-03-20 PROCEDURE — 304952 HCHG R 2 PADS

## 2017-03-20 PROCEDURE — A9270 NON-COVERED ITEM OR SERVICE: HCPCS | Performed by: STUDENT IN AN ORGANIZED HEALTH CARE EDUCATION/TRAINING PROGRAM

## 2017-03-20 PROCEDURE — 700102 HCHG RX REV CODE 250 W/ 637 OVERRIDE(OP): Performed by: INTERNAL MEDICINE

## 2017-03-20 PROCEDURE — 99153 MOD SED SAME PHYS/QHP EA: CPT

## 2017-03-20 PROCEDURE — 700102 HCHG RX REV CODE 250 W/ 637 OVERRIDE(OP): Performed by: STUDENT IN AN ORGANIZED HEALTH CARE EDUCATION/TRAINING PROGRAM

## 2017-03-20 PROCEDURE — 93880 EXTRACRANIAL BILAT STUDY: CPT

## 2017-03-20 PROCEDURE — B2111ZZ FLUOROSCOPY OF MULTIPLE CORONARY ARTERIES USING LOW OSMOLAR CONTRAST: ICD-10-PCS | Performed by: INTERNAL MEDICINE

## 2017-03-20 PROCEDURE — C1894 INTRO/SHEATH, NON-LASER: HCPCS

## 2017-03-20 PROCEDURE — A9270 NON-COVERED ITEM OR SERVICE: HCPCS | Performed by: INTERNAL MEDICINE

## 2017-03-20 PROCEDURE — 93567 NJX CAR CTH SPRVLV AORTGRPHY: CPT

## 2017-03-20 PROCEDURE — 93010 ELECTROCARDIOGRAM REPORT: CPT | Performed by: INTERNAL MEDICINE

## 2017-03-20 PROCEDURE — 80061 LIPID PANEL: CPT

## 2017-03-20 PROCEDURE — 85610 PROTHROMBIN TIME: CPT

## 2017-03-20 PROCEDURE — 93458 L HRT ARTERY/VENTRICLE ANGIO: CPT

## 2017-03-20 PROCEDURE — B2151ZZ FLUOROSCOPY OF LEFT HEART USING LOW OSMOLAR CONTRAST: ICD-10-PCS | Performed by: INTERNAL MEDICINE

## 2017-03-20 RX ORDER — HEPARIN SODIUM,PORCINE 1000/ML
VIAL (ML) INJECTION
Status: COMPLETED
Start: 2017-03-20 | End: 2017-03-20

## 2017-03-20 RX ORDER — LIDOCAINE HYDROCHLORIDE 20 MG/ML
INJECTION, SOLUTION INFILTRATION; PERINEURAL
Status: COMPLETED
Start: 2017-03-20 | End: 2017-03-20

## 2017-03-20 RX ORDER — VERAPAMIL HYDROCHLORIDE 2.5 MG/ML
INJECTION, SOLUTION INTRAVENOUS
Status: COMPLETED
Start: 2017-03-20 | End: 2017-03-20

## 2017-03-20 RX ORDER — MIDAZOLAM HYDROCHLORIDE 1 MG/ML
INJECTION INTRAMUSCULAR; INTRAVENOUS
Status: COMPLETED
Start: 2017-03-20 | End: 2017-03-20

## 2017-03-20 RX ORDER — SODIUM CHLORIDE 9 MG/ML
INJECTION, SOLUTION INTRAVENOUS CONTINUOUS
Status: DISPENSED | OUTPATIENT
Start: 2017-03-20 | End: 2017-03-20

## 2017-03-20 RX ADMIN — MIDAZOLAM 2 MG: 1 INJECTION INTRAMUSCULAR; INTRAVENOUS at 10:04

## 2017-03-20 RX ADMIN — IOHEXOL 142 ML: 350 INJECTION, SOLUTION INTRAVENOUS at 10:16

## 2017-03-20 RX ADMIN — LIDOCAINE HYDROCHLORIDE: 20 INJECTION, SOLUTION INFILTRATION; PERINEURAL at 09:42

## 2017-03-20 RX ADMIN — STANDARDIZED SENNA CONCENTRATE AND DOCUSATE SODIUM 2 TABLET: 8.6; 5 TABLET, FILM COATED ORAL at 20:08

## 2017-03-20 RX ADMIN — MAGNESIUM SULFATE IN DEXTROSE 1 G: 10 INJECTION, SOLUTION INTRAVENOUS at 11:04

## 2017-03-20 RX ADMIN — SODIUM CHLORIDE: 9 INJECTION, SOLUTION INTRAVENOUS at 20:08

## 2017-03-20 RX ADMIN — SODIUM CHLORIDE: 9 INJECTION, SOLUTION INTRAVENOUS at 05:13

## 2017-03-20 RX ADMIN — FENTANYL CITRATE 75 MCG: 50 INJECTION, SOLUTION INTRAMUSCULAR; INTRAVENOUS at 10:05

## 2017-03-20 RX ADMIN — METOPROLOL TARTRATE 25 MG: 25 TABLET, FILM COATED ORAL at 20:08

## 2017-03-20 RX ADMIN — METOPROLOL TARTRATE 25 MG: 25 TABLET, FILM COATED ORAL at 11:07

## 2017-03-20 RX ADMIN — SODIUM CHLORIDE: 9 INJECTION, SOLUTION INTRAVENOUS at 11:05

## 2017-03-20 RX ADMIN — VERAPAMIL HYDROCHLORIDE 5 MG: 2.5 INJECTION, SOLUTION INTRAVENOUS at 09:42

## 2017-03-20 RX ADMIN — NITROGLYCERIN 10 ML: 20 INJECTION INTRAVENOUS at 09:42

## 2017-03-20 RX ADMIN — HEPARIN SODIUM 2000 UNITS: 200 INJECTION, SOLUTION INTRAVENOUS at 09:30

## 2017-03-20 RX ADMIN — HEPARIN SODIUM: 1000 INJECTION, SOLUTION INTRAVENOUS; SUBCUTANEOUS at 09:42

## 2017-03-20 ASSESSMENT — ENCOUNTER SYMPTOMS
BRUISES/BLEEDS EASILY: 0
CHILLS: 0
TINGLING: 1
DOUBLE VISION: 0
LOSS OF CONSCIOUSNESS: 0
PALPITATIONS: 0
HEADACHES: 0
NAUSEA: 0
SHORTNESS OF BREATH: 0
MYALGIAS: 0
BACK PAIN: 0
BLURRED VISION: 0
DIZZINESS: 0
VOMITING: 0
DEPRESSION: 0
COUGH: 0
FEVER: 0
FOCAL WEAKNESS: 0
HEARTBURN: 0
WEAKNESS: 0
FLANK PAIN: 0

## 2017-03-20 ASSESSMENT — PAIN SCALES - GENERAL
PAINLEVEL_OUTOF10: 0

## 2017-03-20 ASSESSMENT — COPD QUESTIONNAIRES
DURING THE PAST 4 WEEKS HOW MUCH DID YOU FEEL SHORT OF BREATH: NONE/LITTLE OF THE TIME
COPD SCREENING SCORE: 1
DO YOU EVER COUGH UP ANY MUCUS OR PHLEGM?: NO/ONLY WITH OCCASIONAL COLDS OR INFECTIONS
HAVE YOU SMOKED AT LEAST 100 CIGARETTES IN YOUR ENTIRE LIFE: NO/DON'T KNOW

## 2017-03-20 ASSESSMENT — LIFESTYLE VARIABLES
SUBSTANCE_ABUSE: 0
EVER_SMOKED: NEVER

## 2017-03-20 NOTE — PROGRESS NOTES
Writer received report at bedside.  Pt. In bed, no apparent distress noted, call light within reach and bed in lowest position and  locks on.   Pt. Alert and verbal, VSS, denies pain; however,encouraged to call for anything.
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
## 2017-03-20 NOTE — PROGRESS NOTES
Pt to cath lab, pt placed on Zoll, consent in chart, EKG finished, IV SL. Dressing changed, flushes well

## 2017-03-20 NOTE — CARE PLAN
Problem: Venous Thromboembolism (VTW)/Deep Vein Thrombosis (DVT) Prevention:  Goal: Patient will participate in Venous Thrombosis (VTE)/Deep Vein Thrombosis (DVT)Prevention Measures  Intervention: Assess and monitor for anticoagulation complications  Encouraged to ambulate and do leg exercise in bed      Problem: Discharge Barriers/Planning  Goal: Patient’s continuum of care needs will be met  Outcome: PROGRESSING SLOWER THAN EXPECTED  Pt is aware he needs to stay until probably Thursday, surgery time is wed at 1pm  Intervention: Assess potential discharge barriers on admission and throughout hospital stay  Aware what could happen if he were to leave too early in his comndition  Intervention: Involve patient and significant other/support system in setting and prioritizing goals for hospital stay and discharge  Family in room most of day

## 2017-03-20 NOTE — PROGRESS NOTES
Bed in low position, call light within reach, pt oriented to room, IVF running, pt aware of plan today, NPO since mn
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
## 2017-03-20 NOTE — PROGRESS NOTES
Patient seen and films reviewed. Patient needs ascending aortic aneurysm repair, AVR w/ JAIRON possible aortic root replacement. Surgery has been scheduled for 1 pm Wednesday. Full consult to follow.
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
## 2017-03-20 NOTE — PROGRESS NOTES
Skin check completed, no skin breakdown noted.  psoriasis noted on sacrum, pt aware and knew about previous to admission.  2nd RN Maverick verified skin

## 2017-03-20 NOTE — CATH LAB
Immediate Post-Operative Note      PreOp Diagnosis: severe aortic stenosis. Ascending aortic aneurysm    PostOp Diagnosis: see below    Procedure(s) :  Coronary Angiography, Left Heart Catheterization, Left Ventriculography, Ascending Aortogram    Surgeon(s):  Carlos Jj M.D.    Type of Anesthesia: Moderate Sedation    Specimen: None    Findings:   EF 55%  Pullback aortic valve gradient: 100 mmHg  Patent coronary arteries  Ascending aorta 6.4 cm    Complications: none    Plan: CT surgery consultation for AVR and AAA repair      Carlos Jj M.D.  3/20/2017 10:26 AM

## 2017-03-20 NOTE — PROGRESS NOTES
[facility] Internal Medicine Interval Note    Name Flex Perrin       1960   Age/Sex 56 y.o. male   MRN [id number]   Code Status Full Code     After 5PM or if no immediate response to page, please call for cross-coverage  Attending/Team: Norah Perrin Call [phone] to page   1st Call - Day Intern (R1):   Jef Cho 2nd Call - Day Sr. Resident (R2/R3):   Erik     Chief complaint/ reason for interval visit  Syncope, severe aortic stenosis, large thoracic aortic aneurysm    Interval Problem Update  Sinus nicole, hypotension, MS prolong, beta blockade with vitals at goals set by cardiology  Active Hospital Problems    Diagnosis   • Syncope [R55] Assist with ambulation   • Thoracic aortic aneurysm without rupture (CMS-HCC) [I71.2] Ascending Aortogram   • Severe aortic stenosis [I35.0] Coronary Angiography, Left Heart Catheterization,   • Aortic insufficiency with aortic stenosis [I35.2]   CT surgery consultation for AVR and AAA repair    Review of Systems   Constitutional: Negative for fever and chills.   Eyes: Negative for blurred vision and double vision.   Respiratory: Negative for cough and shortness of breath.    Cardiovascular: Negative for chest pain, palpitations and leg swelling.   Gastrointestinal: Negative for heartburn, nausea and vomiting.   Genitourinary: Negative for dysuria.   Musculoskeletal: Negative for myalgias.   Skin: Negative for rash.   Neurological: Negative for dizziness, loss of consciousness and headaches.   Endo/Heme/Allergies: Does not bruise/bleed easily.   Psychiatric/Behavioral: Negative for depression.     Consultants/Specialty  Cardiothoracic surgery  Cardiology    Disposition  Inpatient    Quality Measures  EKG reviewed, Labs reviewed, Medications reviewed and Radiology images reviewed  Bradley catheter: No Bradley        DVT prophylaxis - mechanical: SCDs          Physical Exam       Filed Vitals:    17 1600 17 2100 17 0000 17 0400   BP:  140/74 103/28 93/52 106/69   Pulse: 68 62 60 64   Temp: 36.8 °C (98.2 °F) 36.8 °C (98.2 °F) 37.7 °C (99.9 °F) 36.5 °C (97.7 °F)   Resp: 17 17 18 18   Weight:       SpO2: 97% 96% 92% 95%     There is no height on file to calculate BMI.  Oxygen Therapy:  Pulse Oximetry: 95 %, O2 (LPM): 0, O2 Delivery: None (Room Air)    Physical Exam   Constitutional: He is oriented to person, place, and time and well-developed, well-nourished, and in no distress. No distress.   HENT:   Head: Normocephalic and atraumatic.   Eyes: Pupils are equal, round, and reactive to light.   Neck: No JVD present.   Cardiovascular: Normal rate and regular rhythm.  Exam reveals no gallop and no friction rub.    Murmur heard.  4/6 systolic murmer loudest at RUSB   Pulmonary/Chest: Effort normal. No respiratory distress. He has no wheezes.   Abdominal: He exhibits no distension. There is no tenderness. There is no rebound.   Musculoskeletal: He exhibits no edema.   2+ radial and pedal pulses bilaterally   Neurological: He is alert and oriented to person, place, and time.   Skin: Skin is warm and dry. He is not diaphoretic.   Psychiatric: Affect normal.         Lab Data Review:  Recent Results (from the past 24 hour(s))   EKG (ER)    Collection Time: 17  9:44 AM   Result Value Ref Range    Report       [facility] Emergency Dept[facility]    Test Date:  2017  Pt Name:    IMAGE SIXTY                  Department: ER  MRN:        [id number]                      Room:       T733  Gender:     M                            Technician: [id number]  :                                     Requested By:RAJIV FRAUSTO  Order #:    [id number]                    Reading MD: RAJIV FRAUSTO, DO    Measurements  Intervals                                Axis  Rate:       70                           P:          56  NV:         188                          QRS:        -27  QRSD:       96                           T:          132  QT:          416  QTc:        449    Interpretive Statements  SINUS RHYTHM  LEFT ATRIAL ABNORMALITY  LVH WITH SECONDARY REPOLARIZATION ABNORMALITY  ANTERIOR Q WAVES, POSSIBLY DUE TO LVH  No previous ECG available for comparison    Electronically Signed On 3- 15:52:17 PDT by RAJIV FRAUSTO DO     DIAGNOSTIC ALCOHOL    Collection Time: 03/19/17  9:45 AM   Result Value Ref Range    Diagnostic Alcohol 0.00 0.00 g/dL   CBC WITHOUT DIFFERENTIAL    Collection Time: 03/19/17  9:45 AM   Result Value Ref Range    WBC 5.7 4.8 - 10.8 K/uL    RBC 5.94 4.70 - 6.10 M/uL    Hemoglobin 16.7 14.0 - 18.0 g/dL    Hematocrit 49.4 42.0 - 52.0 %    MCV 83.2 81.4 - 97.8 fL    MCH 28.1 27.0 - 33.0 pg    MCHC 33.8 33.7 - 35.3 g/dL    RDW 35.7 (L) 35.9 - 50.0 fL    Platelet Count 173 164 - 446 K/uL    MPV 10.8 9.0 - 12.9 fL   COMP METABOLIC PANEL    Collection Time: 03/19/17  9:45 AM   Result Value Ref Range    Sodium 139 135 - 145 mmol/L    Potassium 3.9 3.6 - 5.5 mmol/L    Chloride 105 96 - 112 mmol/L    Co2 25 20 - 33 mmol/L    Anion Gap 9.0 0.0 - 11.9    Glucose 119 (H) 65 - 99 mg/dL    Bun 18 8 - 22 mg/dL    Creatinine 1.01 0.50 - 1.40 mg/dL    Calcium 9.5 8.5 - 10.5 mg/dL    AST(SGOT) 24 12 - 45 U/L    ALT(SGPT) 34 2 - 50 U/L    Alkaline Phosphatase 49 30 - 99 U/L    Total Bilirubin 0.9 0.1 - 1.5 mg/dL    Albumin 4.7 3.2 - 4.9 g/dL    Total Protein 7.5 6.0 - 8.2 g/dL    Globulin 2.8 1.9 - 3.5 g/dL    A-G Ratio 1.7 g/dL   TROPONIN    Collection Time: 03/19/17  9:45 AM   Result Value Ref Range    Troponin I 0.02 0.00 - 0.04 ng/mL   PHOSPHORUS    Collection Time: 03/19/17  9:45 AM   Result Value Ref Range    Phosphorus 3.2 2.5 - 4.5 mg/dL   MAGNESIUM    Collection Time: 03/19/17  9:45 AM   Result Value Ref Range    Magnesium 1.9 1.5 - 2.5 mg/dL   COD (ADULT)    Collection Time: 03/19/17  9:45 AM   Result Value Ref Range    ABO Grouping Only A     Rh Grouping Only NEG     Antibody Screen-Cod NEG    ESTIMATED GFR    Collection Time:  03/19/17  9:45 AM   Result Value Ref Range    GFR If African American >60 >60 mL/min/1.73 m 2    GFR If Non African American >60 >60 mL/min/1.73 m 2   TSH    Collection Time: 03/19/17  9:45 AM   Result Value Ref Range    TSH 1.640 0.300 - 3.700 uIU/mL   ECHOCARDIOGRAM COMP W/O CONT    Collection Time: 03/19/17  1:08 PM   Result Value Ref Range    Eject.Frac. MOD BP 53.96     Eject.Frac. MOD 4C 56.1     Eject.Frac. MOD 2C 51.5     Left Ventrical Ejection Fraction 55    ABO CONFIRMATION    Collection Time: 03/19/17  3:12 PM   Result Value Ref Range    Second ABO Typing With Cod A    Urinalysis    Collection Time: 03/19/17  4:11 PM   Result Value Ref Range    Color Lt. Yellow     Character Clear     Specific Gravity 1.025 <1.035    Ph 7.5 5.0-8.0    Glucose Negative Negative mg/dL    Ketones Negative Negative mg/dL    Protein Negative Negative mg/dL    Bilirubin Negative Negative    Nitrite Negative Negative    Leukocyte Esterase Negative Negative    Occult Blood Negative Negative    Micro Urine Req see below    CBC with Differential    Collection Time: 03/20/17  3:34 AM   Result Value Ref Range    WBC 6.2 4.8 - 10.8 K/uL    RBC 5.45 4.70 - 6.10 M/uL    Hemoglobin 15.3 14.0 - 18.0 g/dL    Hematocrit 45.5 42.0 - 52.0 %    MCV 83.5 81.4 - 97.8 fL    MCH 28.1 27.0 - 33.0 pg    MCHC 33.6 (L) 33.7 - 35.3 g/dL    RDW 36.4 35.9 - 50.0 fL    Platelet Count 164 164 - 446 K/uL    MPV 11.2 9.0 - 12.9 fL    Neutrophils-Polys 64.00 44.00 - 72.00 %    Lymphocytes 24.30 22.00 - 41.00 %    Monocytes 8.50 0.00 - 13.40 %    Eosinophils 1.90 0.00 - 6.90 %    Basophils 1.00 0.00 - 1.80 %    Immature Granulocytes 0.30 0.00 - 0.90 %    Nucleated RBC 0.00 /100 WBC    Neutrophils (Absolute) 3.98 1.82 - 7.42 K/uL    Lymphs (Absolute) 1.51 1.00 - 4.80 K/uL    Monos (Absolute) 0.53 0.00 - 0.85 K/uL    Eos (Absolute) 0.12 0.00 - 0.51 K/uL    Baso (Absolute) 0.06 0.00 - 0.12 K/uL    Immature Granulocytes (abs) 0.02 0.00 - 0.11 K/uL    NRBC  (Absolute) 0.00 K/uL   Comp Metabolic Panel (CMP)    Collection Time: 03/20/17  3:34 AM   Result Value Ref Range    Sodium 138 135 - 145 mmol/L    Potassium 4.0 3.6 - 5.5 mmol/L    Chloride 109 96 - 112 mmol/L    Co2 23 20 - 33 mmol/L    Anion Gap 6.0 0.0 - 11.9    Glucose 100 (H) 65 - 99 mg/dL    Bun 17 8 - 22 mg/dL    Creatinine 0.83 0.50 - 1.40 mg/dL    Calcium 8.7 8.5 - 10.5 mg/dL    AST(SGOT) 18 12 - 45 U/L    ALT(SGPT) 26 2 - 50 U/L    Alkaline Phosphatase 44 30 - 99 U/L    Total Bilirubin 0.8 0.1 - 1.5 mg/dL    Albumin 4.0 3.2 - 4.9 g/dL    Total Protein 6.1 6.0 - 8.2 g/dL    Globulin 2.1 1.9 - 3.5 g/dL    A-G Ratio 1.9 g/dL   Lipid Profile (Lipid Panel) Fasting    Collection Time: 03/20/17  3:34 AM   Result Value Ref Range    Cholesterol,Tot 105 100 - 199 mg/dL    Triglycerides 76 0 - 149 mg/dL    HDL 22 (A) >=40 mg/dL    LDL 68 <100 mg/dL   ESTIMATED GFR    Collection Time: 03/20/17  3:34 AM   Result Value Ref Range    GFR If African American >60 >60 mL/min/1.73 m 2    GFR If Non African American >60 >60 mL/min/1.73 m 2     Active Hospital Problems    Diagnosis   • Syncope [R55]   • Thoracic aortic aneurysm without rupture (CMS-HCC) [I71.2]   • Severe aortic stenosis [I35.0]   • Aortic insufficiency with aortic stenosis [I35.2]     ASSESSMENT BY PROBLEM    1. Syncope - Secondary to below    2. Severe aortic stenosis: Calcific, possible congenital bicuspid given relatively young age, Left heart cath today. No history of rheumatic heart disease. No prior symptoms of angina or heart failure, presented with first episode of syncope. Symptomatic severe AS therefore needs AVR, cardiology and CTS on board.   - AVR indicated for symptomatic severe AS  - coronary angiography, left heart cath today  - CT surgery consultation for AVR and AAA repair  - Fall precautions, ambulate only with two person assist    3. Large Ascending Thoracic Aortic Aneurysm - Noted on admission CTA, 6.1 X 5.4 cm. No evidence of rupture or  dissection on CTA. Surgery indicated for size > 5.5cm, CTS on board. Denies chest, back or abdominal pain. Beta blockade started on admission, pulse and BP at goal.   - Risk factor modification   > nonsmoker, statin should be started before discharge and titrated to LDL < 70  - Beta blocker, consider ACEI on discharge, titrate SBP < 110 and HR < 60 provided patient tolerates without symptoms   - Needs repair due to size > 5.5cm, CTS on board  - Should he develop cardiac symptoms, must rule out acute aortic syndromes urgently    PLAN  - metoprolol 25mg BID per cards goal SBP < 110, HR < 60 for ascending aortic dissection    - coronary angiography, left heart cath  - Patient needs ascending aortic aneurysm repair, AVR w/ JAIRON possible aortic root replacement. Surgery has been scheduled for 1 pm Wednesday  - Monitor closely for symptoms of dissection, need stat page to CTS if symptomatic (gnawing chest, back or abdominal pain)  - Tele monitoring  - Hold pharm DVT ppx in perioperative setting  - SCDs

## 2017-03-21 ENCOUNTER — APPOINTMENT (OUTPATIENT)
Dept: RADIOLOGY | Facility: MEDICAL CENTER | Age: 57
DRG: 217 | End: 2017-03-21
Attending: NURSE PRACTITIONER
Payer: MEDICAID

## 2017-03-21 PROBLEM — I71.9 AORTIC ANEURYSM (HCC): Status: ACTIVE | Noted: 2017-03-21

## 2017-03-21 LAB
ABO GROUP BLD: NORMAL
ALBUMIN SERPL BCP-MCNC: 4 G/DL (ref 3.2–4.9)
ALBUMIN SERPL BCP-MCNC: 4.4 G/DL (ref 3.2–4.9)
ALBUMIN/GLOB SERPL: 1.8 G/DL
ALBUMIN/GLOB SERPL: 2.2 G/DL
ALP SERPL-CCNC: 42 U/L (ref 30–99)
ALP SERPL-CCNC: 48 U/L (ref 30–99)
ALT SERPL-CCNC: 23 U/L (ref 2–50)
ALT SERPL-CCNC: 25 U/L (ref 2–50)
ANION GAP SERPL CALC-SCNC: 8 MMOL/L (ref 0–11.9)
ANION GAP SERPL CALC-SCNC: 9 MMOL/L (ref 0–11.9)
APPEARANCE UR: CLEAR
APTT PPP: 31.4 SEC (ref 24.7–36)
AST SERPL-CCNC: 16 U/L (ref 12–45)
AST SERPL-CCNC: 19 U/L (ref 12–45)
BASOPHILS # BLD AUTO: 0.5 % (ref 0–1.8)
BASOPHILS # BLD AUTO: 0.6 % (ref 0–1.8)
BASOPHILS # BLD: 0.03 K/UL (ref 0–0.12)
BASOPHILS # BLD: 0.04 K/UL (ref 0–0.12)
BILIRUB SERPL-MCNC: 1.1 MG/DL (ref 0.1–1.5)
BILIRUB SERPL-MCNC: 1.1 MG/DL (ref 0.1–1.5)
BILIRUB UR QL STRIP.AUTO: NEGATIVE
BLD GP AB SCN SERPL QL: NORMAL
BUN SERPL-MCNC: 16 MG/DL (ref 8–22)
BUN SERPL-MCNC: 17 MG/DL (ref 8–22)
CALCIUM SERPL-MCNC: 8.6 MG/DL (ref 8.5–10.5)
CALCIUM SERPL-MCNC: 9 MG/DL (ref 8.5–10.5)
CHLORIDE SERPL-SCNC: 108 MMOL/L (ref 96–112)
CHLORIDE SERPL-SCNC: 110 MMOL/L (ref 96–112)
CO2 SERPL-SCNC: 22 MMOL/L (ref 20–33)
CO2 SERPL-SCNC: 25 MMOL/L (ref 20–33)
COLOR UR: YELLOW
CREAT SERPL-MCNC: 0.8 MG/DL (ref 0.5–1.4)
CREAT SERPL-MCNC: 0.83 MG/DL (ref 0.5–1.4)
EKG IMPRESSION: NORMAL
EOSINOPHIL # BLD AUTO: 0.07 K/UL (ref 0–0.51)
EOSINOPHIL # BLD AUTO: 0.11 K/UL (ref 0–0.51)
EOSINOPHIL NFR BLD: 1.2 % (ref 0–6.9)
EOSINOPHIL NFR BLD: 1.7 % (ref 0–6.9)
ERYTHROCYTE [DISTWIDTH] IN BLOOD BY AUTOMATED COUNT: 36.3 FL (ref 35.9–50)
ERYTHROCYTE [DISTWIDTH] IN BLOOD BY AUTOMATED COUNT: 36.3 FL (ref 35.9–50)
EST. AVERAGE GLUCOSE BLD GHB EST-MCNC: 100 MG/DL
GFR SERPL CREATININE-BSD FRML MDRD: >60 ML/MIN/1.73 M 2
GFR SERPL CREATININE-BSD FRML MDRD: >60 ML/MIN/1.73 M 2
GLOBULIN SER CALC-MCNC: 1.8 G/DL (ref 1.9–3.5)
GLOBULIN SER CALC-MCNC: 2.5 G/DL (ref 1.9–3.5)
GLUCOSE SERPL-MCNC: 105 MG/DL (ref 65–99)
GLUCOSE SERPL-MCNC: 96 MG/DL (ref 65–99)
GLUCOSE UR STRIP.AUTO-MCNC: NEGATIVE MG/DL
HBA1C MFR BLD: 5.1 % (ref 0–5.6)
HCT VFR BLD AUTO: 44.8 % (ref 42–52)
HCT VFR BLD AUTO: 44.8 % (ref 42–52)
HGB BLD-MCNC: 15 G/DL (ref 14–18)
HGB BLD-MCNC: 15.5 G/DL (ref 14–18)
IMM GRANULOCYTES # BLD AUTO: 0.01 K/UL (ref 0–0.11)
IMM GRANULOCYTES # BLD AUTO: 0.02 K/UL (ref 0–0.11)
IMM GRANULOCYTES NFR BLD AUTO: 0.2 % (ref 0–0.9)
IMM GRANULOCYTES NFR BLD AUTO: 0.3 % (ref 0–0.9)
INR PPP: 1.15 (ref 0.87–1.13)
KETONES UR STRIP.AUTO-MCNC: NEGATIVE MG/DL
LEUKOCYTE ESTERASE UR QL STRIP.AUTO: NEGATIVE
LYMPHOCYTES # BLD AUTO: 1.41 K/UL (ref 1–4.8)
LYMPHOCYTES # BLD AUTO: 1.68 K/UL (ref 1–4.8)
LYMPHOCYTES NFR BLD: 24.5 % (ref 22–41)
LYMPHOCYTES NFR BLD: 26.1 % (ref 22–41)
MAGNESIUM SERPL-MCNC: 2.1 MG/DL (ref 1.5–2.5)
MCH RBC QN AUTO: 28 PG (ref 27–33)
MCH RBC QN AUTO: 28.6 PG (ref 27–33)
MCHC RBC AUTO-ENTMCNC: 33.5 G/DL (ref 33.7–35.3)
MCHC RBC AUTO-ENTMCNC: 34.6 G/DL (ref 33.7–35.3)
MCV RBC AUTO: 82.7 FL (ref 81.4–97.8)
MCV RBC AUTO: 83.6 FL (ref 81.4–97.8)
MICRO URNS: NORMAL
MONOCYTES # BLD AUTO: 0.39 K/UL (ref 0–0.85)
MONOCYTES # BLD AUTO: 0.48 K/UL (ref 0–0.85)
MONOCYTES NFR BLD AUTO: 6.8 % (ref 0–13.4)
MONOCYTES NFR BLD AUTO: 7.5 % (ref 0–13.4)
NEUTROPHILS # BLD AUTO: 3.84 K/UL (ref 1.82–7.42)
NEUTROPHILS # BLD AUTO: 4.11 K/UL (ref 1.82–7.42)
NEUTROPHILS NFR BLD: 63.8 % (ref 44–72)
NEUTROPHILS NFR BLD: 66.8 % (ref 44–72)
NITRITE UR QL STRIP.AUTO: NEGATIVE
NRBC # BLD AUTO: 0 K/UL
NRBC # BLD AUTO: 0 K/UL
NRBC BLD AUTO-RTO: 0 /100 WBC
NRBC BLD AUTO-RTO: 0 /100 WBC
PH UR STRIP.AUTO: 5.5 [PH]
PLATELET # BLD AUTO: 152 K/UL (ref 164–446)
PLATELET # BLD AUTO: 161 K/UL (ref 164–446)
PMV BLD AUTO: 11 FL (ref 9–12.9)
PMV BLD AUTO: 11.3 FL (ref 9–12.9)
POTASSIUM SERPL-SCNC: 3.9 MMOL/L (ref 3.6–5.5)
POTASSIUM SERPL-SCNC: 4 MMOL/L (ref 3.6–5.5)
PROT SERPL-MCNC: 5.8 G/DL (ref 6–8.2)
PROT SERPL-MCNC: 6.9 G/DL (ref 6–8.2)
PROT UR QL STRIP: NEGATIVE MG/DL
PROTHROMBIN TIME: 15.1 SEC (ref 12–14.6)
RBC # BLD AUTO: 5.36 M/UL (ref 4.7–6.1)
RBC # BLD AUTO: 5.42 M/UL (ref 4.7–6.1)
RBC UR QL AUTO: NEGATIVE
RH BLD: NORMAL
SODIUM SERPL-SCNC: 140 MMOL/L (ref 135–145)
SODIUM SERPL-SCNC: 142 MMOL/L (ref 135–145)
SP GR UR STRIP.AUTO: 1.01
WBC # BLD AUTO: 5.8 K/UL (ref 4.8–10.8)
WBC # BLD AUTO: 6.4 K/UL (ref 4.8–10.8)

## 2017-03-21 PROCEDURE — 700105 HCHG RX REV CODE 258: Performed by: STUDENT IN AN ORGANIZED HEALTH CARE EDUCATION/TRAINING PROGRAM

## 2017-03-21 PROCEDURE — 99232 SBSQ HOSP IP/OBS MODERATE 35: CPT | Mod: GC | Performed by: INTERNAL MEDICINE

## 2017-03-21 PROCEDURE — 36415 COLL VENOUS BLD VENIPUNCTURE: CPT

## 2017-03-21 PROCEDURE — 93005 ELECTROCARDIOGRAM TRACING: CPT | Performed by: NURSE PRACTITIONER

## 2017-03-21 PROCEDURE — 83036 HEMOGLOBIN GLYCOSYLATED A1C: CPT

## 2017-03-21 PROCEDURE — 700102 HCHG RX REV CODE 250 W/ 637 OVERRIDE(OP): Performed by: STUDENT IN AN ORGANIZED HEALTH CARE EDUCATION/TRAINING PROGRAM

## 2017-03-21 PROCEDURE — 85610 PROTHROMBIN TIME: CPT

## 2017-03-21 PROCEDURE — 86850 RBC ANTIBODY SCREEN: CPT

## 2017-03-21 PROCEDURE — A9270 NON-COVERED ITEM OR SERVICE: HCPCS | Performed by: INTERNAL MEDICINE

## 2017-03-21 PROCEDURE — A9270 NON-COVERED ITEM OR SERVICE: HCPCS | Performed by: STUDENT IN AN ORGANIZED HEALTH CARE EDUCATION/TRAINING PROGRAM

## 2017-03-21 PROCEDURE — 83735 ASSAY OF MAGNESIUM: CPT

## 2017-03-21 PROCEDURE — 85025 COMPLETE CBC W/AUTO DIFF WBC: CPT

## 2017-03-21 PROCEDURE — 71020 DX-CHEST-2 VIEWS: CPT

## 2017-03-21 PROCEDURE — 80053 COMPREHEN METABOLIC PANEL: CPT

## 2017-03-21 PROCEDURE — 85730 THROMBOPLASTIN TIME PARTIAL: CPT

## 2017-03-21 PROCEDURE — 770022 HCHG ROOM/CARE - ICU (200)

## 2017-03-21 PROCEDURE — 700102 HCHG RX REV CODE 250 W/ 637 OVERRIDE(OP): Performed by: NURSE PRACTITIONER

## 2017-03-21 PROCEDURE — 86901 BLOOD TYPING SEROLOGIC RH(D): CPT

## 2017-03-21 PROCEDURE — 81003 URINALYSIS AUTO W/O SCOPE: CPT

## 2017-03-21 PROCEDURE — 700102 HCHG RX REV CODE 250 W/ 637 OVERRIDE(OP): Performed by: INTERNAL MEDICINE

## 2017-03-21 PROCEDURE — 93010 ELECTROCARDIOGRAM REPORT: CPT | Performed by: INTERNAL MEDICINE

## 2017-03-21 RX ORDER — POTASSIUM CHLORIDE 20 MEQ/1
40 TABLET, EXTENDED RELEASE ORAL ONCE
Status: COMPLETED | OUTPATIENT
Start: 2017-03-21 | End: 2017-03-21

## 2017-03-21 RX ADMIN — POTASSIUM CHLORIDE 40 MEQ: 1500 TABLET, EXTENDED RELEASE ORAL at 08:30

## 2017-03-21 RX ADMIN — SODIUM CHLORIDE: 9 INJECTION, SOLUTION INTRAVENOUS at 08:31

## 2017-03-21 RX ADMIN — SODIUM CHLORIDE: 9 INJECTION, SOLUTION INTRAVENOUS at 23:33

## 2017-03-21 RX ADMIN — STANDARDIZED SENNA CONCENTRATE AND DOCUSATE SODIUM 2 TABLET: 8.6; 5 TABLET, FILM COATED ORAL at 20:50

## 2017-03-21 RX ADMIN — METOPROLOL TARTRATE 25 MG: 25 TABLET, FILM COATED ORAL at 20:50

## 2017-03-21 RX ADMIN — STANDARDIZED SENNA CONCENTRATE AND DOCUSATE SODIUM 2 TABLET: 8.6; 5 TABLET, FILM COATED ORAL at 08:30

## 2017-03-21 RX ADMIN — METOPROLOL TARTRATE 25 MG: 25 TABLET, FILM COATED ORAL at 08:30

## 2017-03-21 ASSESSMENT — PAIN SCALES - GENERAL
PAINLEVEL_OUTOF10: 0
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
## 2017-03-21 NOTE — PROGRESS NOTES
Pt has been with family in room by elevator playing cards, aware of test, procedures that will happen before surgery and aware of the things and room changes that will happen after surgery, denies any questions at this time

## 2017-03-21 NOTE — PROGRESS NOTES
Bed in low position, IVF running, call light within reach, pt up in chair, pressure dressing removed on right wrist, wrist cleansed and tegaderm placed, pt doing well, knows he will be NPO, denies pain, aware that he is at high risk for syncope and stroke

## 2017-03-21 NOTE — PROGRESS NOTES
Attempted to meet with patient for pre-op education.  Patient not in room, called RN x2 & no answer.  Will try again later.

## 2017-03-21 NOTE — CARE PLAN
Problem: Discharge Barriers/Planning  Goal: Patient’s continuum of care needs will be met  Outcome: PROGRESSING AS EXPECTED  Will be tx to [facility] tmw  Intervention: Assess potential discharge barriers on admission and throughout hospital stay  Aware of surgery date and time  Intervention: Involve patient and significant other/support system in setting and prioritizing goals for hospital stay and discharge  Aware of all things happening  Intervention: Collaborate with Transitional Care Team and Interdisciplinary Team to meet discharge needs  done      Problem: Fluid Volume:  Goal: Will maintain balanced intake and output  Outcome: PROGRESSING AS EXPECTED  IVF running, oral intake adequate
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
## 2017-03-21 NOTE — CARE PLAN
Problem: Pre Op  Goal: Optimal preparation for CABG/Heart Valve surgery  Intervention: Pre Op education to patient/significant other. Provide patient [facility] Patient Guideline for Cardiac Surgery (See Pt. Ed.)  Discussed anatomy and physiology of cardiac surgery with patient and family to include pre-op regimen. Reviewed post-op expectations to include  the use of incentive spirometry with return demonstration, ventilator management, cardiac monitoring, tubes and drains, early ambulation, and expected length of stay. Also provided information on Cardiac Rehab and how to schedule an appointment. Patient and family state full understanding of all information given.  Intervention: Baseline assessment documented to include IS volume, weight, bilateral BP and peripheral pulses.  2750 mL  Intervention: NPO at midnight except cardiac medications. (No ASA, coumadin or Plavix)  Instructed patient nothing to eat or drink after midnight the night prior to scheduled surgery date.  Intervention: Shower with chlorhexidine x 2  Will be assisted by nursing staff.    Patient anxious and concerned about intubation following surgery.  Extensive reassurance and education provided.  Patient also states he startles easily and requests that nursing staff waking him/talk to him prior to touching him.
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
## 2017-03-21 NOTE — PROGRESS NOTES
Writer received report at bedside.  Pt. In bed, no apparent distress noted, call light within reach and bed in lowest position and  locks on.   Pt. A&Ox4,VSS, denies pain; however,encouraged to call for anything.   Pt. being monitored on telemetry and currently has no S/S of cardiopulmonary distress at this time.   Pt. abdomen soft/nondistended, +BSx4, + flatus, no nausea this shift, medicated per MAR, needs attended.   Pt. Skin is intact.  Pt OOB with assist with 2 gait steady denies SOB/PACE  Pt. Has no concerns at this time.  Pt. aware to call for assistance at all times and to immobilize wrist.  Pt. Right wrist pressure dressing intact with no active bleed noted.

## 2017-03-21 NOTE — CARE PLAN
Problem: Knowledge Deficit  Goal: Knowledge of disease process/condition, treatment plan, diagnostic tests, and medications will improve  Intervention: Explain information regarding disease process/condition, treatment plan, diagnostic tests, and medications and document in education  Pt. Educated on plan of care and question answered.

## 2017-03-22 ENCOUNTER — APPOINTMENT (OUTPATIENT)
Dept: RADIOLOGY | Facility: MEDICAL CENTER | Age: 57
DRG: 217 | End: 2017-03-22
Attending: THORACIC SURGERY (CARDIOTHORACIC VASCULAR SURGERY)
Payer: MEDICAID

## 2017-03-22 LAB
ACT BLD: 126 SEC (ref 74–137)
ACT BLD: 147 SEC (ref 74–137)
ACT BLD: 410 SEC (ref 74–137)
ACT BLD: 436 SEC (ref 74–137)
ACT BLD: 456 SEC (ref 74–137)
ACT BLD: 482 SEC (ref 74–137)
ACT BLD: 487 SEC (ref 74–137)
ACT BLD: 538 SEC (ref 74–137)
ACT BLD: 549 SEC (ref 74–137)
ANION GAP SERPL CALC-SCNC: 6 MMOL/L (ref 0–11.9)
ANION GAP SERPL CALC-SCNC: 9 MMOL/L (ref 0–11.9)
APTT PPP: 35.6 SEC (ref 24.7–36)
BARCODED ABORH UBTYP: 6200
BARCODED PRD CODE UBPRD: NORMAL
BARCODED UNIT NUM UBUNT: NORMAL
BASE EXCESS BLDA CALC-SCNC: -1 MMOL/L (ref -4–3)
BASE EXCESS BLDA CALC-SCNC: -2 MMOL/L (ref -4–3)
BASE EXCESS BLDA CALC-SCNC: -2 MMOL/L (ref -4–3)
BASE EXCESS BLDA CALC-SCNC: -3 MMOL/L (ref -4–3)
BASE EXCESS BLDA CALC-SCNC: -4 MMOL/L (ref -4–3)
BASE EXCESS BLDA CALC-SCNC: -5 MMOL/L (ref -4–3)
BASE EXCESS BLDA CALC-SCNC: -5 MMOL/L (ref -4–3)
BASE EXCESS BLDA CALC-SCNC: -7 MMOL/L (ref -4–3)
BASE EXCESS BLDA CALC-SCNC: -7 MMOL/L (ref -4–3)
BASE EXCESS BLDV CALC-SCNC: -5 MMOL/L (ref -4–3)
BASOPHILS # BLD AUTO: 0.5 % (ref 0–1.8)
BASOPHILS # BLD: 0.03 K/UL (ref 0–0.12)
BODY TEMPERATURE: ABNORMAL DEGREES
BUN SERPL-MCNC: 19 MG/DL (ref 8–22)
BUN SERPL-MCNC: 61 MG/DL (ref 8–22)
CA-I BLD ISE-SCNC: 0.96 MMOL/L (ref 1.1–1.3)
CA-I BLD ISE-SCNC: 0.99 MMOL/L (ref 1.1–1.3)
CA-I BLD ISE-SCNC: 0.99 MMOL/L (ref 1.1–1.3)
CA-I BLD ISE-SCNC: 1 MMOL/L (ref 1.1–1.3)
CA-I BLD ISE-SCNC: 1 MMOL/L (ref 1.1–1.3)
CA-I BLD ISE-SCNC: 1.03 MMOL/L (ref 1.1–1.3)
CA-I BLD ISE-SCNC: 1.05 MMOL/L (ref 1.1–1.3)
CA-I BLD ISE-SCNC: 1.09 MMOL/L (ref 1.1–1.3)
CA-I BLD ISE-SCNC: 1.11 MMOL/L (ref 1.1–1.3)
CA-I BLD ISE-SCNC: 1.15 MMOL/L (ref 1.1–1.3)
CALCIUM SERPL-MCNC: 8.3 MG/DL (ref 8.5–10.5)
CALCIUM SERPL-MCNC: 9.1 MG/DL (ref 8.5–10.5)
CHLORIDE SERPL-SCNC: 106 MMOL/L (ref 96–112)
CHLORIDE SERPL-SCNC: 110 MMOL/L (ref 96–112)
CO2 BLDA-SCNC: 20 MMOL/L (ref 20–33)
CO2 BLDA-SCNC: 21 MMOL/L (ref 20–33)
CO2 BLDA-SCNC: 21 MMOL/L (ref 20–33)
CO2 BLDA-SCNC: 22 MMOL/L (ref 20–33)
CO2 BLDA-SCNC: 22 MMOL/L (ref 20–33)
CO2 BLDA-SCNC: 23 MMOL/L (ref 20–33)
CO2 BLDA-SCNC: 24 MMOL/L (ref 20–33)
CO2 BLDA-SCNC: 25 MMOL/L (ref 20–33)
CO2 BLDA-SCNC: 28 MMOL/L (ref 20–33)
CO2 BLDV-SCNC: 23 MMOL/L (ref 20–33)
CO2 SERPL-SCNC: 24 MMOL/L (ref 20–33)
CO2 SERPL-SCNC: 25 MMOL/L (ref 20–33)
COMPONENT P 8504P: NORMAL
CREAT SERPL-MCNC: 0.87 MG/DL (ref 0.5–1.4)
CREAT SERPL-MCNC: 3.66 MG/DL (ref 0.5–1.4)
EOSINOPHIL # BLD AUTO: 0.12 K/UL (ref 0–0.51)
EOSINOPHIL NFR BLD: 2 % (ref 0–6.9)
ERYTHROCYTE [DISTWIDTH] IN BLOOD BY AUTOMATED COUNT: 36 FL (ref 35.9–50)
GFR SERPL CREATININE-BSD FRML MDRD: 17 ML/MIN/1.73 M 2
GFR SERPL CREATININE-BSD FRML MDRD: >60 ML/MIN/1.73 M 2
GLUCOSE BLD-MCNC: 102 MG/DL (ref 65–99)
GLUCOSE BLD-MCNC: 102 MG/DL (ref 65–99)
GLUCOSE BLD-MCNC: 107 MG/DL (ref 65–99)
GLUCOSE BLD-MCNC: 110 MG/DL (ref 65–99)
GLUCOSE BLD-MCNC: 124 MG/DL (ref 65–99)
GLUCOSE BLD-MCNC: 129 MG/DL (ref 65–99)
GLUCOSE BLD-MCNC: 143 MG/DL (ref 65–99)
GLUCOSE BLD-MCNC: 98 MG/DL (ref 65–99)
GLUCOSE SERPL-MCNC: 111 MG/DL (ref 65–99)
GLUCOSE SERPL-MCNC: 123 MG/DL (ref 65–99)
HCO3 BLDA-SCNC: 18.8 MMOL/L (ref 17–25)
HCO3 BLDA-SCNC: 19.4 MMOL/L (ref 17–25)
HCO3 BLDA-SCNC: 20 MMOL/L (ref 17–25)
HCO3 BLDA-SCNC: 21 MMOL/L (ref 17–25)
HCO3 BLDA-SCNC: 21.3 MMOL/L (ref 17–25)
HCO3 BLDA-SCNC: 21.4 MMOL/L (ref 17–25)
HCO3 BLDA-SCNC: 22.6 MMOL/L (ref 17–25)
HCO3 BLDA-SCNC: 23.4 MMOL/L (ref 17–25)
HCO3 BLDA-SCNC: 25.9 MMOL/L (ref 17–25)
HCO3 BLDV-SCNC: 21.6 MMOL/L (ref 24–28)
HCT VFR BLD AUTO: 48.2 % (ref 42–52)
HCT VFR BLD CALC: 27 % (ref 42–52)
HCT VFR BLD CALC: 27 % (ref 42–52)
HCT VFR BLD CALC: 29 % (ref 42–52)
HCT VFR BLD CALC: 30 % (ref 42–52)
HCT VFR BLD CALC: 31 % (ref 42–52)
HCT VFR BLD CALC: 34 % (ref 42–52)
HCT VFR BLD CALC: 35 % (ref 42–52)
HCT VFR BLD CALC: 39 % (ref 42–52)
HGB BLD-MCNC: 10.2 G/DL (ref 14–18)
HGB BLD-MCNC: 10.5 G/DL (ref 14–18)
HGB BLD-MCNC: 11.6 G/DL (ref 14–18)
HGB BLD-MCNC: 11.9 G/DL (ref 14–18)
HGB BLD-MCNC: 13.3 G/DL (ref 14–18)
HGB BLD-MCNC: 16.3 G/DL (ref 14–18)
HGB BLD-MCNC: 9.2 G/DL (ref 14–18)
HGB BLD-MCNC: 9.2 G/DL (ref 14–18)
HGB BLD-MCNC: 9.9 G/DL (ref 14–18)
IMM GRANULOCYTES # BLD AUTO: 0.01 K/UL (ref 0–0.11)
IMM GRANULOCYTES NFR BLD AUTO: 0.2 % (ref 0–0.9)
INR PPP: 1.48 (ref 0.87–1.13)
LYMPHOCYTES # BLD AUTO: 1.48 K/UL (ref 1–4.8)
LYMPHOCYTES NFR BLD: 25 % (ref 22–41)
MAGNESIUM SERPL-MCNC: 1.9 MG/DL (ref 1.5–2.5)
MAGNESIUM SERPL-MCNC: 2.1 MG/DL (ref 1.5–2.5)
MAGNESIUM SERPL-MCNC: 2.9 MG/DL (ref 1.5–2.5)
MCH RBC QN AUTO: 28.4 PG (ref 27–33)
MCHC RBC AUTO-ENTMCNC: 33.8 G/DL (ref 33.7–35.3)
MCV RBC AUTO: 84.1 FL (ref 81.4–97.8)
MONOCYTES # BLD AUTO: 0.45 K/UL (ref 0–0.85)
MONOCYTES NFR BLD AUTO: 7.6 % (ref 0–13.4)
NEUTROPHILS # BLD AUTO: 3.84 K/UL (ref 1.82–7.42)
NEUTROPHILS NFR BLD: 64.7 % (ref 44–72)
NRBC # BLD AUTO: 0 K/UL
NRBC BLD AUTO-RTO: 0 /100 WBC
O2/TOTAL GAS SETTING VFR VENT: 100 %
O2/TOTAL GAS SETTING VFR VENT: 100 %
O2/TOTAL GAS SETTING VFR VENT: 40 %
O2/TOTAL GAS SETTING VFR VENT: 60 %
O2/TOTAL GAS SETTING VFR VENT: 80 %
PCO2 BLDA: 31 MMHG (ref 26–37)
PCO2 BLDA: 34.7 MMHG (ref 26–37)
PCO2 BLDA: 37.8 MMHG (ref 26–37)
PCO2 BLDA: 38.3 MMHG (ref 26–37)
PCO2 BLDA: 38.9 MMHG (ref 26–37)
PCO2 BLDA: 39.5 MMHG (ref 26–37)
PCO2 BLDA: 39.9 MMHG (ref 26–37)
PCO2 BLDA: 46 MMHG (ref 26–37)
PCO2 BLDA: 60.1 MMHG (ref 26–37)
PCO2 BLDV: 43.6 MMHG (ref 41–51)
PCO2 TEMP ADJ BLDA: 31 MMHG (ref 26–37)
PCO2 TEMP ADJ BLDA: 34.7 MMHG (ref 26–37)
PCO2 TEMP ADJ BLDA: 37.9 MMHG (ref 26–37)
PCO2 TEMP ADJ BLDA: 38.3 MMHG (ref 26–37)
PCO2 TEMP ADJ BLDA: 39 MMHG (ref 26–37)
PCO2 TEMP ADJ BLDA: 39.1 MMHG (ref 26–37)
PCO2 TEMP ADJ BLDA: 39.9 MMHG (ref 26–37)
PCO2 TEMP ADJ BLDA: 46 MMHG (ref 26–37)
PCO2 TEMP ADJ BLDA: 60.1 MMHG (ref 26–37)
PCO2 TEMP ADJ BLDV: 43.6 MMHG (ref 41–51)
PH BLDA: 7.24 [PH] (ref 7.4–7.5)
PH BLDA: 7.3 [PH] (ref 7.4–7.5)
PH BLDA: 7.3 [PH] (ref 7.4–7.5)
PH BLDA: 7.32 [PH] (ref 7.4–7.5)
PH BLDA: 7.33 [PH] (ref 7.4–7.5)
PH BLDA: 7.33 [PH] (ref 7.4–7.5)
PH BLDA: 7.35 [PH] (ref 7.4–7.5)
PH BLDA: 7.42 [PH] (ref 7.4–7.5)
PH BLDA: 7.45 [PH] (ref 7.4–7.5)
PH BLDV: 7.3 [PH] (ref 7.31–7.45)
PH TEMP ADJ BLDA: 7.24 [PH] (ref 7.4–7.5)
PH TEMP ADJ BLDA: 7.3 [PH] (ref 7.4–7.5)
PH TEMP ADJ BLDA: 7.3 [PH] (ref 7.4–7.5)
PH TEMP ADJ BLDA: 7.32 [PH] (ref 7.4–7.5)
PH TEMP ADJ BLDA: 7.33 [PH] (ref 7.4–7.5)
PH TEMP ADJ BLDA: 7.33 [PH] (ref 7.4–7.5)
PH TEMP ADJ BLDA: 7.34 [PH] (ref 7.4–7.5)
PH TEMP ADJ BLDA: 7.42 [PH] (ref 7.4–7.5)
PH TEMP ADJ BLDA: 7.45 [PH] (ref 7.4–7.5)
PH TEMP ADJ BLDV: 7.3 [PH] (ref 7.31–7.45)
PLATELET # BLD AUTO: 112 K/UL (ref 164–446)
PLATELET # BLD AUTO: 160 K/UL (ref 164–446)
PMV BLD AUTO: 11.2 FL (ref 9–12.9)
PO2 BLDA: 248 MMHG (ref 64–87)
PO2 BLDA: 308 MMHG (ref 64–87)
PO2 BLDA: 350 MMHG (ref 64–87)
PO2 BLDA: 361 MMHG (ref 64–87)
PO2 BLDA: 399 MMHG (ref 64–87)
PO2 BLDA: 55 MMHG (ref 64–87)
PO2 BLDA: 57 MMHG (ref 64–87)
PO2 BLDA: 77 MMHG (ref 64–87)
PO2 BLDA: 91 MMHG (ref 64–87)
PO2 BLDV: 56 MMHG (ref 25–40)
PO2 TEMP ADJ BLDA: 248 MMHG (ref 64–87)
PO2 TEMP ADJ BLDA: 308 MMHG (ref 64–87)
PO2 TEMP ADJ BLDA: 350 MMHG (ref 64–87)
PO2 TEMP ADJ BLDA: 361 MMHG (ref 64–87)
PO2 TEMP ADJ BLDA: 399 MMHG (ref 64–87)
PO2 TEMP ADJ BLDA: 54 MMHG (ref 64–87)
PO2 TEMP ADJ BLDA: 57 MMHG (ref 64–87)
PO2 TEMP ADJ BLDA: 78 MMHG (ref 64–87)
PO2 TEMP ADJ BLDA: 91 MMHG (ref 64–87)
PO2 TEMP ADJ BLDV: 56 MMHG (ref 25–40)
POTASSIUM BLD-SCNC: 3.6 MMOL/L (ref 3.6–5.5)
POTASSIUM BLD-SCNC: 4 MMOL/L (ref 3.6–5.5)
POTASSIUM BLD-SCNC: 4.2 MMOL/L (ref 3.6–5.5)
POTASSIUM BLD-SCNC: 4.4 MMOL/L (ref 3.6–5.5)
POTASSIUM BLD-SCNC: 4.6 MMOL/L (ref 3.6–5.5)
POTASSIUM BLD-SCNC: 4.9 MMOL/L (ref 3.6–5.5)
POTASSIUM BLD-SCNC: 5.6 MMOL/L (ref 3.6–5.5)
POTASSIUM BLD-SCNC: 5.9 MMOL/L (ref 3.6–5.5)
POTASSIUM BLD-SCNC: 6.6 MMOL/L (ref 3.6–5.5)
POTASSIUM SERPL-SCNC: 3.3 MMOL/L (ref 3.6–5.5)
POTASSIUM SERPL-SCNC: 4.9 MMOL/L (ref 3.6–5.5)
PRODUCT TYPE UPROD: NORMAL
PROTHROMBIN TIME: 18.4 SEC (ref 12–14.6)
RBC # BLD AUTO: 5.73 M/UL (ref 4.7–6.1)
SAO2 % BLDA: 100 % (ref 93–99)
SAO2 % BLDA: 85 % (ref 93–99)
SAO2 % BLDA: 87 % (ref 93–99)
SAO2 % BLDA: 95 % (ref 93–99)
SAO2 % BLDA: 96 % (ref 93–99)
SAO2 % BLDV: 86 %
SODIUM BLD-SCNC: 139 MMOL/L (ref 135–145)
SODIUM BLD-SCNC: 140 MMOL/L (ref 135–145)
SODIUM BLD-SCNC: 141 MMOL/L (ref 135–145)
SODIUM BLD-SCNC: 141 MMOL/L (ref 135–145)
SODIUM BLD-SCNC: 142 MMOL/L (ref 135–145)
SODIUM BLD-SCNC: 142 MMOL/L (ref 135–145)
SODIUM BLD-SCNC: 143 MMOL/L (ref 135–145)
SODIUM BLD-SCNC: 144 MMOL/L (ref 135–145)
SODIUM BLD-SCNC: 145 MMOL/L (ref 135–145)
SODIUM SERPL-SCNC: 140 MMOL/L (ref 135–145)
SODIUM SERPL-SCNC: 140 MMOL/L (ref 135–145)
SPECIMEN DRAWN FROM PATIENT: ABNORMAL
UNIT STATUS USTAT: NORMAL
WBC # BLD AUTO: 5.9 K/UL (ref 4.8–10.8)

## 2017-03-22 PROCEDURE — 160048 HCHG OR STATISTICAL LEVEL 1-5: Performed by: THORACIC SURGERY (CARDIOTHORACIC VASCULAR SURGERY)

## 2017-03-22 PROCEDURE — 500890 HCHG PACK, OPEN HEART: Performed by: THORACIC SURGERY (CARDIOTHORACIC VASCULAR SURGERY)

## 2017-03-22 PROCEDURE — 02RF0JZ REPLACEMENT OF AORTIC VALVE WITH SYNTHETIC SUBSTITUTE, OPEN APPROACH: ICD-10-PCS | Performed by: THORACIC SURGERY (CARDIOTHORACIC VASCULAR SURGERY)

## 2017-03-22 PROCEDURE — A4606 OXYGEN PROBE USED W OXIMETER: HCPCS | Performed by: THORACIC SURGERY (CARDIOTHORACIC VASCULAR SURGERY)

## 2017-03-22 PROCEDURE — 700102 HCHG RX REV CODE 250 W/ 637 OVERRIDE(OP): Performed by: ANESTHESIOLOGY

## 2017-03-22 PROCEDURE — 503050 HCHG COR-KNOT QUICK LOADS 6PACK: Performed by: THORACIC SURGERY (CARDIOTHORACIC VASCULAR SURGERY)

## 2017-03-22 PROCEDURE — 94002 VENT MGMT INPAT INIT DAY: CPT

## 2017-03-22 PROCEDURE — A9270 NON-COVERED ITEM OR SERVICE: HCPCS | Performed by: NURSE PRACTITIONER

## 2017-03-22 PROCEDURE — 501506 HCHG SUTURE GUIDE, VALVE REPLACEMENT: Performed by: THORACIC SURGERY (CARDIOTHORACIC VASCULAR SURGERY)

## 2017-03-22 PROCEDURE — 500112 HCHG BONE WAX: Performed by: THORACIC SURGERY (CARDIOTHORACIC VASCULAR SURGERY)

## 2017-03-22 PROCEDURE — 99291 CRITICAL CARE FIRST HOUR: CPT | Performed by: INTERNAL MEDICINE

## 2017-03-22 PROCEDURE — 503001 HCHG PERFUSION: Performed by: THORACIC SURGERY (CARDIOTHORACIC VASCULAR SURGERY)

## 2017-03-22 PROCEDURE — P9034 PLATELETS, PHERESIS: HCPCS

## 2017-03-22 PROCEDURE — A9270 NON-COVERED ITEM OR SERVICE: HCPCS | Performed by: INTERNAL MEDICINE

## 2017-03-22 PROCEDURE — 700105 HCHG RX REV CODE 258

## 2017-03-22 PROCEDURE — 85347 COAGULATION TIME ACTIVATED: CPT | Mod: 91

## 2017-03-22 PROCEDURE — 88304 TISSUE EXAM BY PATHOLOGIST: CPT

## 2017-03-22 PROCEDURE — 02RX0JZ REPLACEMENT OF THORACIC AORTA, ASCENDING/ARCH WITH SYNTHETIC SUBSTITUTE, OPEN APPROACH: ICD-10-PCS | Performed by: THORACIC SURGERY (CARDIOTHORACIC VASCULAR SURGERY)

## 2017-03-22 PROCEDURE — 700111 HCHG RX REV CODE 636 W/ 250 OVERRIDE (IP)

## 2017-03-22 PROCEDURE — 93010 ELECTROCARDIOGRAM REPORT: CPT | Performed by: INTERNAL MEDICINE

## 2017-03-22 PROCEDURE — 85730 THROMBOPLASTIN TIME PARTIAL: CPT

## 2017-03-22 PROCEDURE — A6402 STERILE GAUZE <= 16 SQ IN: HCPCS | Performed by: THORACIC SURGERY (CARDIOTHORACIC VASCULAR SURGERY)

## 2017-03-22 PROCEDURE — 160042 HCHG SURGERY MINUTES - EA ADDL 1 MIN LEVEL 5: Performed by: THORACIC SURGERY (CARDIOTHORACIC VASCULAR SURGERY)

## 2017-03-22 PROCEDURE — 36430 TRANSFUSION BLD/BLD COMPNT: CPT

## 2017-03-22 PROCEDURE — 700101 HCHG RX REV CODE 250: Performed by: ANESTHESIOLOGY

## 2017-03-22 PROCEDURE — 71010 DX-CHEST-PORTABLE (1 VIEW): CPT

## 2017-03-22 PROCEDURE — 700101 HCHG RX REV CODE 250: Performed by: NURSE PRACTITIONER

## 2017-03-22 PROCEDURE — C1729 CATH, DRAINAGE: HCPCS | Performed by: THORACIC SURGERY (CARDIOTHORACIC VASCULAR SURGERY)

## 2017-03-22 PROCEDURE — 503033 HCHG COR-KNOT TITANIUM QUICKLOADS: Performed by: THORACIC SURGERY (CARDIOTHORACIC VASCULAR SURGERY)

## 2017-03-22 PROCEDURE — 160009 HCHG ANES TIME/MIN: Performed by: THORACIC SURGERY (CARDIOTHORACIC VASCULAR SURGERY)

## 2017-03-22 PROCEDURE — 84295 ASSAY OF SERUM SODIUM: CPT | Mod: 91

## 2017-03-22 PROCEDURE — 500734 HCHG INSERT, STEALTH: Performed by: THORACIC SURGERY (CARDIOTHORACIC VASCULAR SURGERY)

## 2017-03-22 PROCEDURE — 501745 HCHG WIRE, SURGICAL STEEL: Performed by: THORACIC SURGERY (CARDIOTHORACIC VASCULAR SURGERY)

## 2017-03-22 PROCEDURE — L8670 VASCULAR GRAFT, SYNTHETIC: HCPCS | Performed by: THORACIC SURGERY (CARDIOTHORACIC VASCULAR SURGERY)

## 2017-03-22 PROCEDURE — 82803 BLOOD GASES ANY COMBINATION: CPT | Mod: 91

## 2017-03-22 PROCEDURE — 37799 UNLISTED PX VASCULAR SURGERY: CPT

## 2017-03-22 PROCEDURE — 85014 HEMATOCRIT: CPT | Mod: 91

## 2017-03-22 PROCEDURE — 700105 HCHG RX REV CODE 258: Performed by: ANESTHESIOLOGY

## 2017-03-22 PROCEDURE — 110382 HCHG SHELL REV 271: Performed by: THORACIC SURGERY (CARDIOTHORACIC VASCULAR SURGERY)

## 2017-03-22 PROCEDURE — 84132 ASSAY OF SERUM POTASSIUM: CPT | Mod: 91

## 2017-03-22 PROCEDURE — 503000 HCHG SUTURE, OHS: Performed by: THORACIC SURGERY (CARDIOTHORACIC VASCULAR SURGERY)

## 2017-03-22 PROCEDURE — 82330 ASSAY OF CALCIUM: CPT | Mod: 91

## 2017-03-22 PROCEDURE — 700111 HCHG RX REV CODE 636 W/ 250 OVERRIDE (IP): Performed by: NURSE PRACTITIONER

## 2017-03-22 PROCEDURE — 700102 HCHG RX REV CODE 250 W/ 637 OVERRIDE(OP): Performed by: NURSE PRACTITIONER

## 2017-03-22 PROCEDURE — 93005 ELECTROCARDIOGRAM TRACING: CPT | Performed by: NURSE PRACTITIONER

## 2017-03-22 PROCEDURE — 94150 VITAL CAPACITY TEST: CPT

## 2017-03-22 PROCEDURE — 93312 ECHO TRANSESOPHAGEAL: CPT

## 2017-03-22 PROCEDURE — 93321 DOPPLER ECHO F-UP/LMTD STD: CPT

## 2017-03-22 PROCEDURE — C1898 LEAD, PMKR, OTHER THAN TRANS: HCPCS | Performed by: THORACIC SURGERY (CARDIOTHORACIC VASCULAR SURGERY)

## 2017-03-22 PROCEDURE — 770022 HCHG ROOM/CARE - ICU (200)

## 2017-03-22 PROCEDURE — 88311 DECALCIFY TISSUE: CPT

## 2017-03-22 PROCEDURE — 700101 HCHG RX REV CODE 250

## 2017-03-22 PROCEDURE — 85610 PROTHROMBIN TIME: CPT

## 2017-03-22 PROCEDURE — 80048 BASIC METABOLIC PNL TOTAL CA: CPT

## 2017-03-22 PROCEDURE — 88305 TISSUE EXAM BY PATHOLOGIST: CPT

## 2017-03-22 PROCEDURE — C9248 INJ, CLEVIDIPINE BUTYRATE: HCPCS

## 2017-03-22 PROCEDURE — 83735 ASSAY OF MAGNESIUM: CPT | Mod: 91

## 2017-03-22 PROCEDURE — 500385 HCHG DRAIN, PLEUROVAC ADUL: Performed by: THORACIC SURGERY (CARDIOTHORACIC VASCULAR SURGERY)

## 2017-03-22 PROCEDURE — B24BZZ4 ULTRASONOGRAPHY OF HEART WITH AORTA, TRANSESOPHAGEAL: ICD-10-PCS | Performed by: THORACIC SURGERY (CARDIOTHORACIC VASCULAR SURGERY)

## 2017-03-22 PROCEDURE — 82962 GLUCOSE BLOOD TEST: CPT

## 2017-03-22 PROCEDURE — 502240 HCHG MISC OR SUPPLY RC 0272: Performed by: THORACIC SURGERY (CARDIOTHORACIC VASCULAR SURGERY)

## 2017-03-22 PROCEDURE — 501689: Performed by: THORACIC SURGERY (CARDIOTHORACIC VASCULAR SURGERY)

## 2017-03-22 PROCEDURE — 502627 HCHG HEMOSTAT, SURGICEL 4X4: Performed by: THORACIC SURGERY (CARDIOTHORACIC VASCULAR SURGERY)

## 2017-03-22 PROCEDURE — 501519 HCHG SUTURE, E PACK: Performed by: THORACIC SURGERY (CARDIOTHORACIC VASCULAR SURGERY)

## 2017-03-22 PROCEDURE — 82947 ASSAY GLUCOSE BLOOD QUANT: CPT

## 2017-03-22 PROCEDURE — 85049 AUTOMATED PLATELET COUNT: CPT

## 2017-03-22 PROCEDURE — 160024 HCHG STAT PERFUSION STAT: Performed by: THORACIC SURGERY (CARDIOTHORACIC VASCULAR SURGERY)

## 2017-03-22 PROCEDURE — 160031 HCHG SURGERY MINUTES - 1ST 30 MINS LEVEL 5: Performed by: THORACIC SURGERY (CARDIOTHORACIC VASCULAR SURGERY)

## 2017-03-22 PROCEDURE — 110371 HCHG SHELL REV 272: Performed by: THORACIC SURGERY (CARDIOTHORACIC VASCULAR SURGERY)

## 2017-03-22 PROCEDURE — 700105 HCHG RX REV CODE 258: Performed by: NURSE PRACTITIONER

## 2017-03-22 PROCEDURE — 700105 HCHG RX REV CODE 258: Performed by: STUDENT IN AN ORGANIZED HEALTH CARE EDUCATION/TRAINING PROGRAM

## 2017-03-22 PROCEDURE — 5A1221Z PERFORMANCE OF CARDIAC OUTPUT, CONTINUOUS: ICD-10-PCS | Performed by: THORACIC SURGERY (CARDIOTHORACIC VASCULAR SURGERY)

## 2017-03-22 PROCEDURE — 500002 HCHG ADHESIVE, DERMABOND: Performed by: THORACIC SURGERY (CARDIOTHORACIC VASCULAR SURGERY)

## 2017-03-22 PROCEDURE — 700102 HCHG RX REV CODE 250 W/ 637 OVERRIDE(OP): Performed by: INTERNAL MEDICINE

## 2017-03-22 PROCEDURE — P9047 ALBUMIN (HUMAN), 25%, 50ML: HCPCS

## 2017-03-22 PROCEDURE — 85025 COMPLETE CBC W/AUTO DIFF WBC: CPT

## 2017-03-22 PROCEDURE — 700111 HCHG RX REV CODE 636 W/ 250 OVERRIDE (IP): Performed by: ANESTHESIOLOGY

## 2017-03-22 PROCEDURE — 93325 DOPPLER ECHO COLOR FLOW MAPG: CPT

## 2017-03-22 DEVICE — GRAFT HEMASHIELD PLAT 34MM - 30CM WDV: Type: IMPLANTABLE DEVICE | Status: FUNCTIONAL

## 2017-03-22 DEVICE — IMPLANTABLE DEVICE: Type: IMPLANTABLE DEVICE | Status: FUNCTIONAL

## 2017-03-22 RX ORDER — CEFAZOLIN SODIUM 2 G/100ML
2 INJECTION, SOLUTION INTRAVENOUS ONCE
Status: COMPLETED | OUTPATIENT
Start: 2017-03-23 | End: 2017-03-23

## 2017-03-22 RX ORDER — OXYCODONE HYDROCHLORIDE 5 MG/1
5 TABLET ORAL
Status: DISCONTINUED | OUTPATIENT
Start: 2017-03-22 | End: 2017-03-27 | Stop reason: HOSPADM

## 2017-03-22 RX ORDER — ACETAMINOPHEN 325 MG/1
650 TABLET ORAL EVERY 4 HOURS PRN
Status: DISCONTINUED | OUTPATIENT
Start: 2017-03-22 | End: 2017-03-27 | Stop reason: HOSPADM

## 2017-03-22 RX ORDER — PROMETHAZINE HYDROCHLORIDE 25 MG/1
25 SUPPOSITORY RECTAL EVERY 6 HOURS PRN
Status: DISCONTINUED | OUTPATIENT
Start: 2017-03-22 | End: 2017-03-27 | Stop reason: HOSPADM

## 2017-03-22 RX ORDER — HYDROCODONE BITARTRATE AND ACETAMINOPHEN 5; 325 MG/1; MG/1
1-2 TABLET ORAL EVERY 4 HOURS PRN
Status: DISCONTINUED | OUTPATIENT
Start: 2017-03-22 | End: 2017-03-27 | Stop reason: HOSPADM

## 2017-03-22 RX ORDER — SODIUM CHLORIDE, SODIUM GLUCONATE, SODIUM ACETATE, POTASSIUM CHLORIDE AND MAGNESIUM CHLORIDE 526; 502; 368; 37; 30 MG/100ML; MG/100ML; MG/100ML; MG/100ML; MG/100ML
INJECTION, SOLUTION INTRAVENOUS PRN
Status: DISCONTINUED | OUTPATIENT
Start: 2017-03-22 | End: 2017-03-27 | Stop reason: HOSPADM

## 2017-03-22 RX ORDER — BISACODYL 10 MG
10 SUPPOSITORY, RECTAL RECTAL
Status: DISCONTINUED | OUTPATIENT
Start: 2017-03-22 | End: 2017-03-27 | Stop reason: HOSPADM

## 2017-03-22 RX ORDER — ACETAMINOPHEN 650 MG/1
650 SUPPOSITORY RECTAL EVERY 4 HOURS PRN
Status: DISCONTINUED | OUTPATIENT
Start: 2017-03-22 | End: 2017-03-27 | Stop reason: HOSPADM

## 2017-03-22 RX ORDER — TRAMADOL HYDROCHLORIDE 50 MG/1
50 TABLET ORAL EVERY 4 HOURS PRN
Status: DISCONTINUED | OUTPATIENT
Start: 2017-03-22 | End: 2017-03-27 | Stop reason: HOSPADM

## 2017-03-22 RX ORDER — POTASSIUM CHLORIDE 7.45 MG/ML
10 INJECTION INTRAVENOUS ONCE
Status: COMPLETED | OUTPATIENT
Start: 2017-03-22 | End: 2017-03-22

## 2017-03-22 RX ORDER — DEXTROSE MONOHYDRATE 25 G/50ML
25 INJECTION, SOLUTION INTRAVENOUS PRN
Status: DISCONTINUED | OUTPATIENT
Start: 2017-03-22 | End: 2017-03-24

## 2017-03-22 RX ORDER — ONDANSETRON 2 MG/ML
4 INJECTION INTRAMUSCULAR; INTRAVENOUS EVERY 6 HOURS PRN
Status: DISCONTINUED | OUTPATIENT
Start: 2017-03-22 | End: 2017-03-27 | Stop reason: HOSPADM

## 2017-03-22 RX ORDER — LACTULOSE 20 G/30ML
30 SOLUTION ORAL
Status: DISCONTINUED | OUTPATIENT
Start: 2017-03-22 | End: 2017-03-27 | Stop reason: HOSPADM

## 2017-03-22 RX ORDER — MIDAZOLAM HYDROCHLORIDE 1 MG/ML
.5-2 INJECTION INTRAMUSCULAR; INTRAVENOUS
Status: DISCONTINUED | OUTPATIENT
Start: 2017-03-22 | End: 2017-03-23

## 2017-03-22 RX ORDER — DOCUSATE SODIUM 100 MG/1
100 CAPSULE, LIQUID FILLED ORAL EVERY MORNING
Status: DISCONTINUED | OUTPATIENT
Start: 2017-03-22 | End: 2017-03-27 | Stop reason: HOSPADM

## 2017-03-22 RX ORDER — SODIUM CHLORIDE 9 MG/ML
INJECTION, SOLUTION INTRAVENOUS
Status: ACTIVE
Start: 2017-03-22 | End: 2017-03-23

## 2017-03-22 RX ORDER — AMOXICILLIN 250 MG
1 CAPSULE ORAL
Status: DISCONTINUED | OUTPATIENT
Start: 2017-03-22 | End: 2017-03-27 | Stop reason: HOSPADM

## 2017-03-22 RX ORDER — ENEMA 19; 7 G/133ML; G/133ML
1 ENEMA RECTAL
Status: DISCONTINUED | OUTPATIENT
Start: 2017-03-22 | End: 2017-03-27 | Stop reason: HOSPADM

## 2017-03-22 RX ORDER — OXYCODONE HYDROCHLORIDE 10 MG/1
10 TABLET ORAL
Status: DISCONTINUED | OUTPATIENT
Start: 2017-03-22 | End: 2017-03-27 | Stop reason: HOSPADM

## 2017-03-22 RX ORDER — AMOXICILLIN 250 MG
1 CAPSULE ORAL NIGHTLY
Status: DISCONTINUED | OUTPATIENT
Start: 2017-03-22 | End: 2017-03-27 | Stop reason: HOSPADM

## 2017-03-22 RX ORDER — SODIUM CHLORIDE 9 MG/ML
INJECTION, SOLUTION INTRAVENOUS CONTINUOUS
Status: DISCONTINUED | OUTPATIENT
Start: 2017-03-22 | End: 2017-03-24

## 2017-03-22 RX ORDER — NITROGLYCERIN 20 MG/100ML
0-100 INJECTION INTRAVENOUS CONTINUOUS
Status: DISCONTINUED | OUTPATIENT
Start: 2017-03-22 | End: 2017-03-23

## 2017-03-22 RX ORDER — MORPHINE SULFATE 4 MG/ML
4 INJECTION, SOLUTION INTRAMUSCULAR; INTRAVENOUS
Status: DISCONTINUED | OUTPATIENT
Start: 2017-03-22 | End: 2017-03-23

## 2017-03-22 RX ADMIN — SODIUM BICARBONATE 50 MEQ: 84 INJECTION, SOLUTION INTRAVENOUS at 19:55

## 2017-03-22 RX ADMIN — SODIUM CHLORIDE: 9 INJECTION, SOLUTION INTRAVENOUS at 17:59

## 2017-03-22 RX ADMIN — POTASSIUM CHLORIDE 10 MEQ: 7.46 INJECTION, SOLUTION INTRAVENOUS at 18:22

## 2017-03-22 RX ADMIN — HYDROCODONE BITARTRATE AND ACETAMINOPHEN 1 TABLET: 5; 325 TABLET ORAL at 23:54

## 2017-03-22 RX ADMIN — SODIUM CHLORIDE 1 UNITS/HR: 9 INJECTION, SOLUTION INTRAVENOUS at 19:39

## 2017-03-22 RX ADMIN — INSULIN HUMAN 1 UNITS: 100 INJECTION, SOLUTION PARENTERAL at 20:38

## 2017-03-22 RX ADMIN — SODIUM CHLORIDE: 9 INJECTION, SOLUTION INTRAVENOUS at 09:49

## 2017-03-22 RX ADMIN — METOPROLOL TARTRATE 25 MG: 25 TABLET, FILM COATED ORAL at 08:08

## 2017-03-22 RX ADMIN — MAGNESIUM SULFATE IN DEXTROSE 1 G: 10 INJECTION, SOLUTION INTRAVENOUS at 18:22

## 2017-03-22 RX ADMIN — INSULIN HUMAN 1 UNITS: 100 INJECTION, SOLUTION PARENTERAL at 21:44

## 2017-03-22 RX ADMIN — DEXMEDETOMIDINE HYDROCHLORIDE 0.3 MCG/KG/HR: 100 INJECTION, SOLUTION, CONCENTRATE INTRAVENOUS at 20:31

## 2017-03-22 ASSESSMENT — PAIN SCALES - GENERAL
PAINLEVEL_OUTOF10: 0

## 2017-03-22 ASSESSMENT — PULMONARY FUNCTION TESTS
FVC: 1.3
FVC: 1.3
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
## 2017-03-22 NOTE — PROGRESS NOTES
Patient transferred to [facility] with RN, patient belongings with patient, bedside report given to night RN Joleen.

## 2017-03-22 NOTE — PROGRESS NOTES
Received report and assumed care.  Family at bedside.  Pt resting comfortably on bed, states no pain or discomfort at this time. VSS.  Pt expresses anxiety about cardiac procedure to be performed tomorrow.  Discussed plan of care with pt and triggers and coping mechanisms for anxiety.  Pt states all his questions have been answered at this time.

## 2017-03-22 NOTE — PROGRESS NOTES
Patient having ascending aortic aneurysm repair, AVR w/ JAIRON possible aortic root replacement this am. No changes to H&P.

## 2017-03-22 NOTE — CARE PLAN
Problem: Safety  Goal: Will remain free from injury  Outcome: PROGRESSING AS EXPECTED  Pt provided assistance to restroom. Pt provided education on safety, fall prevention, and use of call light. Treaded socks in place, call light within reach, hourly rounding in effect.         Problem: Infection  Goal: Will remain free from infection  Outcome: PROGRESSING AS EXPECTED  Pt assessed for s/s of infection. Standard precautions in effect.

## 2017-03-23 ENCOUNTER — APPOINTMENT (OUTPATIENT)
Dept: RADIOLOGY | Facility: MEDICAL CENTER | Age: 57
DRG: 217 | End: 2017-03-23
Attending: NURSE PRACTITIONER
Payer: MEDICAID

## 2017-03-23 LAB
ANION GAP SERPL CALC-SCNC: 5 MMOL/L (ref 0–11.9)
BASE EXCESS BLDA CALC-SCNC: -4 MMOL/L (ref -4–3)
BASE EXCESS BLDA CALC-SCNC: -7 MMOL/L (ref -4–3)
BODY TEMPERATURE: ABNORMAL DEGREES
BODY TEMPERATURE: ABNORMAL DEGREES
BUN SERPL-MCNC: 23 MG/DL (ref 8–22)
CA-I SERPL-SCNC: 1 MMOL/L (ref 1.1–1.3)
CALCIUM SERPL-MCNC: 7.7 MG/DL (ref 8.5–10.5)
CHLORIDE SERPL-SCNC: 111 MMOL/L (ref 96–112)
CO2 BLDA-SCNC: 20 MMOL/L (ref 20–33)
CO2 BLDA-SCNC: 21 MMOL/L (ref 20–33)
CO2 SERPL-SCNC: 25 MMOL/L (ref 20–33)
CREAT SERPL-MCNC: 0.72 MG/DL (ref 0.5–1.4)
EKG IMPRESSION: NORMAL
ERYTHROCYTE [DISTWIDTH] IN BLOOD BY AUTOMATED COUNT: 37.5 FL (ref 35.9–50)
GFR SERPL CREATININE-BSD FRML MDRD: >60 ML/MIN/1.73 M 2
GLUCOSE BLD-MCNC: 105 MG/DL (ref 65–99)
GLUCOSE BLD-MCNC: 106 MG/DL (ref 65–99)
GLUCOSE BLD-MCNC: 108 MG/DL (ref 65–99)
GLUCOSE BLD-MCNC: 109 MG/DL (ref 65–99)
GLUCOSE BLD-MCNC: 109 MG/DL (ref 65–99)
GLUCOSE BLD-MCNC: 110 MG/DL (ref 65–99)
GLUCOSE BLD-MCNC: 112 MG/DL (ref 65–99)
GLUCOSE BLD-MCNC: 118 MG/DL (ref 65–99)
GLUCOSE BLD-MCNC: 118 MG/DL (ref 65–99)
GLUCOSE BLD-MCNC: 125 MG/DL (ref 65–99)
GLUCOSE BLD-MCNC: 130 MG/DL (ref 65–99)
GLUCOSE BLD-MCNC: 134 MG/DL (ref 65–99)
GLUCOSE BLD-MCNC: 136 MG/DL (ref 65–99)
GLUCOSE BLD-MCNC: 138 MG/DL (ref 65–99)
GLUCOSE BLD-MCNC: 140 MG/DL (ref 65–99)
GLUCOSE BLD-MCNC: 143 MG/DL (ref 65–99)
GLUCOSE BLD-MCNC: 148 MG/DL (ref 65–99)
GLUCOSE BLD-MCNC: 87 MG/DL (ref 65–99)
GLUCOSE BLD-MCNC: 99 MG/DL (ref 65–99)
GLUCOSE SERPL-MCNC: 120 MG/DL (ref 65–99)
HCO3 BLDA-SCNC: 18.7 MMOL/L (ref 17–25)
HCO3 BLDA-SCNC: 19.8 MMOL/L (ref 17–25)
HCT VFR BLD AUTO: 37.5 % (ref 42–52)
HGB BLD-MCNC: 12.6 G/DL (ref 14–18)
INR PPP: 1.39 (ref 0.87–1.13)
LV EJECT FRACT  99904: 55
MAGNESIUM SERPL-MCNC: 2.5 MG/DL (ref 1.5–2.5)
MCH RBC QN AUTO: 28.5 PG (ref 27–33)
MCHC RBC AUTO-ENTMCNC: 33.6 G/DL (ref 33.7–35.3)
MCV RBC AUTO: 84.8 FL (ref 81.4–97.8)
O2/TOTAL GAS SETTING VFR VENT: 60 %
O2/TOTAL GAS SETTING VFR VENT: 80 %
PCO2 BLDA: 30.5 MMHG (ref 26–37)
PCO2 BLDA: 38.1 MMHG (ref 26–37)
PCO2 TEMP ADJ BLDA: 30.5 MMHG (ref 26–37)
PCO2 TEMP ADJ BLDA: 38 MMHG (ref 26–37)
PH BLDA: 7.3 [PH] (ref 7.4–7.5)
PH BLDA: 7.42 [PH] (ref 7.4–7.5)
PH TEMP ADJ BLDA: 7.3 [PH] (ref 7.4–7.5)
PH TEMP ADJ BLDA: 7.42 [PH] (ref 7.4–7.5)
PLATELET # BLD AUTO: 120 K/UL (ref 164–446)
PMV BLD AUTO: 11.3 FL (ref 9–12.9)
PO2 BLDA: 321 MMHG (ref 64–87)
PO2 BLDA: 79 MMHG (ref 64–87)
PO2 TEMP ADJ BLDA: 321 MMHG (ref 64–87)
PO2 TEMP ADJ BLDA: 78 MMHG (ref 64–87)
POTASSIUM BLD-SCNC: 6.1 MMOL/L (ref 3.6–5.5)
POTASSIUM SERPL-SCNC: 3.9 MMOL/L (ref 3.6–5.5)
POTASSIUM SERPL-SCNC: 4.3 MMOL/L (ref 3.6–5.5)
POTASSIUM SERPL-SCNC: 4.7 MMOL/L (ref 3.6–5.5)
PROTHROMBIN TIME: 17.5 SEC (ref 12–14.6)
RBC # BLD AUTO: 4.42 M/UL (ref 4.7–6.1)
SAO2 % BLDA: 100 % (ref 93–99)
SAO2 % BLDA: 94 % (ref 93–99)
SODIUM BLD-SCNC: 136 MMOL/L (ref 135–145)
SODIUM SERPL-SCNC: 141 MMOL/L (ref 135–145)
SPECIMEN DRAWN FROM PATIENT: ABNORMAL
SPECIMEN DRAWN FROM PATIENT: ABNORMAL
WBC # BLD AUTO: 8.8 K/UL (ref 4.8–10.8)

## 2017-03-23 PROCEDURE — 80048 BASIC METABOLIC PNL TOTAL CA: CPT

## 2017-03-23 PROCEDURE — 93010 ELECTROCARDIOGRAM REPORT: CPT | Mod: 76 | Performed by: INTERNAL MEDICINE

## 2017-03-23 PROCEDURE — 93005 ELECTROCARDIOGRAM TRACING: CPT | Performed by: NURSE PRACTITIONER

## 2017-03-23 PROCEDURE — A9270 NON-COVERED ITEM OR SERVICE: HCPCS

## 2017-03-23 PROCEDURE — 85610 PROTHROMBIN TIME: CPT

## 2017-03-23 PROCEDURE — 82330 ASSAY OF CALCIUM: CPT

## 2017-03-23 PROCEDURE — 84132 ASSAY OF SERUM POTASSIUM: CPT

## 2017-03-23 PROCEDURE — A9270 NON-COVERED ITEM OR SERVICE: HCPCS | Performed by: NURSE PRACTITIONER

## 2017-03-23 PROCEDURE — 770020 HCHG ROOM/CARE - TELE (206)

## 2017-03-23 PROCEDURE — A9270 NON-COVERED ITEM OR SERVICE: HCPCS | Performed by: INTERNAL MEDICINE

## 2017-03-23 PROCEDURE — 700102 HCHG RX REV CODE 250 W/ 637 OVERRIDE(OP): Performed by: NURSE PRACTITIONER

## 2017-03-23 PROCEDURE — 700112 HCHG RX REV CODE 229: Performed by: NURSE PRACTITIONER

## 2017-03-23 PROCEDURE — 700102 HCHG RX REV CODE 250 W/ 637 OVERRIDE(OP)

## 2017-03-23 PROCEDURE — 700111 HCHG RX REV CODE 636 W/ 250 OVERRIDE (IP): Performed by: CLINICAL NURSE SPECIALIST

## 2017-03-23 PROCEDURE — 700102 HCHG RX REV CODE 250 W/ 637 OVERRIDE(OP): Performed by: THORACIC SURGERY (CARDIOTHORACIC VASCULAR SURGERY)

## 2017-03-23 PROCEDURE — 700105 HCHG RX REV CODE 258: Performed by: NURSE PRACTITIONER

## 2017-03-23 PROCEDURE — 700102 HCHG RX REV CODE 250 W/ 637 OVERRIDE(OP): Performed by: INTERNAL MEDICINE

## 2017-03-23 PROCEDURE — 99233 SBSQ HOSP IP/OBS HIGH 50: CPT | Mod: GC | Performed by: INTERNAL MEDICINE

## 2017-03-23 PROCEDURE — 85027 COMPLETE CBC AUTOMATED: CPT

## 2017-03-23 PROCEDURE — 82962 GLUCOSE BLOOD TEST: CPT | Mod: 91

## 2017-03-23 PROCEDURE — 93005 ELECTROCARDIOGRAM TRACING: CPT | Performed by: THORACIC SURGERY (CARDIOTHORACIC VASCULAR SURGERY)

## 2017-03-23 PROCEDURE — 71010 DX-CHEST-PORTABLE (1 VIEW): CPT

## 2017-03-23 PROCEDURE — 700111 HCHG RX REV CODE 636 W/ 250 OVERRIDE (IP): Performed by: NURSE PRACTITIONER

## 2017-03-23 RX ORDER — WARFARIN SODIUM 5 MG/1
5 TABLET ORAL
Status: DISCONTINUED | OUTPATIENT
Start: 2017-03-23 | End: 2017-03-24

## 2017-03-23 RX ORDER — KETOROLAC TROMETHAMINE 30 MG/ML
30 INJECTION, SOLUTION INTRAMUSCULAR; INTRAVENOUS EVERY 6 HOURS
Status: DISCONTINUED | OUTPATIENT
Start: 2017-03-23 | End: 2017-03-26

## 2017-03-23 RX ORDER — ATORVASTATIN CALCIUM 40 MG/1
40 TABLET, FILM COATED ORAL
Status: DISCONTINUED | OUTPATIENT
Start: 2017-03-23 | End: 2017-03-27 | Stop reason: HOSPADM

## 2017-03-23 RX ADMIN — INSULIN HUMAN 10 UNITS: 100 INJECTION, SUSPENSION SUBCUTANEOUS at 15:32

## 2017-03-23 RX ADMIN — ASPIRIN 81 MG: 81 TABLET ORAL at 08:00

## 2017-03-23 RX ADMIN — KETOROLAC TROMETHAMINE 30 MG: 30 INJECTION, SOLUTION INTRAMUSCULAR; INTRAVENOUS at 01:47

## 2017-03-23 RX ADMIN — DOCUSATE SODIUM 100 MG: 100 CAPSULE ORAL at 08:00

## 2017-03-23 RX ADMIN — HYDROCODONE BITARTRATE AND ACETAMINOPHEN 1 TABLET: 5; 325 TABLET ORAL at 11:13

## 2017-03-23 RX ADMIN — MAGNESIUM SULFATE IN DEXTROSE 1 G: 10 INJECTION, SOLUTION INTRAVENOUS at 17:32

## 2017-03-23 RX ADMIN — OXYCODONE HYDROCHLORIDE 5 MG: 5 TABLET ORAL at 22:21

## 2017-03-23 RX ADMIN — METOPROLOL TARTRATE 25 MG: 25 TABLET, FILM COATED ORAL at 09:23

## 2017-03-23 RX ADMIN — OXYCODONE HYDROCHLORIDE 5 MG: 5 TABLET ORAL at 03:40

## 2017-03-23 RX ADMIN — OXYCODONE HYDROCHLORIDE 5 MG: 5 TABLET ORAL at 06:28

## 2017-03-23 RX ADMIN — KETOROLAC TROMETHAMINE 30 MG: 30 INJECTION, SOLUTION INTRAMUSCULAR; INTRAVENOUS at 08:00

## 2017-03-23 RX ADMIN — METOPROLOL TARTRATE 25 MG: 25 TABLET, FILM COATED ORAL at 13:43

## 2017-03-23 RX ADMIN — CEFAZOLIN SODIUM 2 G: 2 INJECTION, SOLUTION INTRAVENOUS at 00:49

## 2017-03-23 RX ADMIN — WARFARIN SODIUM 5 MG: 5 TABLET ORAL at 17:29

## 2017-03-23 RX ADMIN — ATORVASTATIN CALCIUM 40 MG: 40 TABLET, FILM COATED ORAL at 21:05

## 2017-03-23 RX ADMIN — OXYCODONE HYDROCHLORIDE 5 MG: 5 TABLET ORAL at 17:29

## 2017-03-23 RX ADMIN — OXYCODONE HYDROCHLORIDE 10 MG: 10 TABLET ORAL at 12:26

## 2017-03-23 RX ADMIN — INSULIN HUMAN 1 UNITS: 100 INJECTION, SOLUTION PARENTERAL at 02:14

## 2017-03-23 RX ADMIN — STANDARDIZED SENNA CONCENTRATE AND DOCUSATE SODIUM 1 TABLET: 8.6; 5 TABLET, FILM COATED ORAL at 21:05

## 2017-03-23 RX ADMIN — INSULIN LISPRO 7 UNITS: 100 INJECTION, SOLUTION INTRAVENOUS; SUBCUTANEOUS at 18:30

## 2017-03-23 RX ADMIN — KETOROLAC TROMETHAMINE 30 MG: 30 INJECTION, SOLUTION INTRAMUSCULAR; INTRAVENOUS at 21:02

## 2017-03-23 RX ADMIN — OXYCODONE HYDROCHLORIDE 5 MG: 5 TABLET ORAL at 09:22

## 2017-03-23 RX ADMIN — HYDROCODONE BITARTRATE AND ACETAMINOPHEN 1 TABLET: 5; 325 TABLET ORAL at 00:40

## 2017-03-23 RX ADMIN — SODIUM CHLORIDE: 9 INJECTION, SOLUTION INTRAVENOUS at 01:48

## 2017-03-23 RX ADMIN — INSULIN LISPRO 7 UNITS: 100 INJECTION, SOLUTION INTRAVENOUS; SUBCUTANEOUS at 12:58

## 2017-03-23 RX ADMIN — KETOROLAC TROMETHAMINE 30 MG: 30 INJECTION, SOLUTION INTRAMUSCULAR; INTRAVENOUS at 13:43

## 2017-03-23 ASSESSMENT — PAIN SCALES - GENERAL
PAINLEVEL_OUTOF10: 4
PAINLEVEL_OUTOF10: 2
PAINLEVEL_OUTOF10: 6
PAINLEVEL_OUTOF10: 3
PAINLEVEL_OUTOF10: 6
PAINLEVEL_OUTOF10: 2
PAINLEVEL_OUTOF10: 7
PAINLEVEL_OUTOF10: 6
PAINLEVEL_OUTOF10: 0
PAINLEVEL_OUTOF10: 4
PAINLEVEL_OUTOF10: 2
PAINLEVEL_OUTOF10: 6
PAINLEVEL_OUTOF10: 1
PAINLEVEL_OUTOF10: 5
PAINLEVEL_OUTOF10: 4
PAINLEVEL_OUTOF10: 5
PAINLEVEL_OUTOF10: 9
PAINLEVEL_OUTOF10: 4
PAINLEVEL_OUTOF10: 1

## 2017-03-23 ASSESSMENT — COPD QUESTIONNAIRES
DO YOU EVER COUGH UP ANY MUCUS OR PHLEGM?: NO/ONLY WITH OCCASIONAL COLDS OR INFECTIONS
HAVE YOU SMOKED AT LEAST 100 CIGARETTES IN YOUR ENTIRE LIFE: NO/DON'T KNOW
DURING THE PAST 4 WEEKS HOW MUCH DID YOU FEEL SHORT OF BREATH: NONE/LITTLE OF THE TIME
COPD SCREENING SCORE: 1

## 2017-03-23 ASSESSMENT — ENCOUNTER SYMPTOMS
CARDIOVASCULAR NEGATIVE: 1
LOSS OF CONSCIOUSNESS: 0
SHORTNESS OF BREATH: 0
NEUROLOGICAL NEGATIVE: 1
MUSCULOSKELETAL NEGATIVE: 1
PSYCHIATRIC NEGATIVE: 1
COUGH: 0
CONSTITUTIONAL NEGATIVE: 1
NERVOUS/ANXIOUS: 0
WEIGHT LOSS: 0
DEPRESSION: 0
EYES NEGATIVE: 1
DIZZINESS: 0
RESPIRATORY NEGATIVE: 1
INSOMNIA: 0
GASTROINTESTINAL NEGATIVE: 1

## 2017-03-23 ASSESSMENT — LIFESTYLE VARIABLES: EVER_SMOKED: NEVER

## 2017-03-23 NOTE — PROGRESS NOTES
Received report from Jimmie GRIFFITH; assumed patient care.  Assessment complete, pt intubated, following, moves all.  VSS, cleviprex in use for blood pressure control.    Pt arrived from OR at 1730.      Updated pt and family at bedside on POC, nods understanding.  Bed in lowest position, call light within reach, RN at bedside.

## 2017-03-23 NOTE — CARE PLAN
Problem: Hyperinflation:  Goal: Prevent or improve atelectasis  Post cabg. IS 1500 after waking up

## 2017-03-23 NOTE — PROGRESS NOTES
Monitor Summary:    Sinus rhythm/sinus bradycardia/1* HB at rates of 55-61.  Occasional PACs and PVCs with PVC runs of up to 10 beats.    0.20 / 0.10 / 0.44      12h chart check and report given to oncoming RN.

## 2017-03-23 NOTE — RESPIRATORY CARE
Extubation    Cuff leak noted yes  Stridor present no        O2 (LPM): 0 (03/22/17 1000)     Patient toleration yes  RCP Complete? yes  Events/Summary/Plan: RN and RT agree to extubate  MD aware (03/22/17 8300)
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
## 2017-03-23 NOTE — PROGRESS NOTES
Pt transported to T611 via hospital bed by OR staff. Dr Mota at bedside. All lines and gtts verified. 2nd RN Zoraida at bedside. All labs drawn. Family updated on POC. Bradley in place to gravity, CT X2 to LCS.

## 2017-03-23 NOTE — PROGRESS NOTES
Chest tubes removed by GLADIS Nuñez. Pt tolerated well. No complaints of pain. Tube sites have minimal serosanguinous output. Covered with gauze and foam tape.

## 2017-03-23 NOTE — CARE PLAN
Problem: Post Op Day 1 CABG/Heart Valve Replacement  Goal: Optimal care of the post op CABG/heart valve replacement Post Op Day 1  Intervention: EKG and CXR completed  Done.   Intervention: All valve patients: PT/INR daily  Done.  Intervention: Antibiotics are discontinued within 24 hours of anesthesia end time unless indication documented for continuation beyond 24 hours  Done.   Intervention: Daily Weights  Done with NOC RN.  Intervention: Up in chair for all meals  In progress.   Intervention: Ambulate in am if stable. First ambulation is 25 feet. Repeat x 3 as tolerated. Ambulate again before bed.  Pt ambulated with wheelchair push 150 ft. Pt able to ambulate with standby assist to bathroom.   Intervention: Discontinue rahman catheter unless documented reason for continuation  Discontinued.   Intervention: Remove original surgical dressing after 24 hrs, leave open to air unless otherwise specified by physician  Done. Incision intact.   Intervention: Cardiac rehab phase 1 ordered and smoking cessation education and program referral as appropriate  In progress.   Intervention: Consider chest tube removal by MD  Removed.   Intervention: IS q 1 hour while awake and record best IS volume  Best IS 1500  Intervention: Graduated elastic stockings  On patient.   Intervention: Saline lock IV  Done.   Intervention: Transfer to [facility], begin VS q 4 hours  Done.   Intervention: After 24th hour post-anesthesia end time, transition patient to Cardiac Surgery SQ Insulin Protocol  Done.   Intervention: If patient is CABG or on home beta-blocker, start Beta-Blocker on POD 1 or POD 2 or contraindication documented  Done.

## 2017-03-23 NOTE — OR SURGEON
Immediate Post-Operative Note      PreOp Diagnosis: AS, AAA    PostOp Diagnosis: AS, AAA    Procedure(s):  AORTIC VALVE REPLACEMENT (27mm Dior Intuity pericardial valve)  REDO AVR (27mm Dior Perimount Magna pericardial valve)  AORTIC ASCENDING ANEURYSM REPAIR (34mm Hemashield tube graft)  JAIRON    Surgeon(s):  Janel White M.D.    Assistant:  FIDE Sabillon    Anesthesiologist/Type of Anesthesia:  Anesthesiologist: Fausto Palmer M.D.  Anesthesia Technician: Jose J Espino/General    Surgical Staff:  Assistant: ZOEY Sahu  Circulator: Perla Taveras R.N.  Perfusionist: Vidal Junior Circulator: Olaf Prater R.N.  Relief Scrub: Sylvie Tejada  Scrub Person: Tracie Seaman; Ela Castillo    Specimen: AV, AAA    Estimated Blood Loss: Min    Findings: AS, AAA    Complications: None        3/22/2017 5:07 PM Janel White

## 2017-03-23 NOTE — CARE PLAN
Problem: Hyperinflation:  Goal: Prevent or improve atelectasis  Outcome: PROGRESSING AS EXPECTED

## 2017-03-23 NOTE — CARE PLAN
Problem: Post Op Day 1 CABG/Heart Valve Replacement  Goal: Optimal care of the post op CABG/heart valve replacement Post Op Day 1  Intervention: EKG and CXR completed  Completed.  Intervention: All valve patients: PT/INR daily  Done  Intervention: Antibiotics are discontinued within 24 hours of anesthesia end time unless indication documented for continuation beyond 24 hours  Done  Intervention: Daily Weights  Weighed at stand up scale  Intervention: Up in chair for all meals  Up to chair for breakfast  Intervention: Ambulate in am if stable. First ambulation is 25 feet. Repeat x 3 as tolerated. Ambulate again before bed.  Ambulated to end of hallway and back with wheelchair push (approx 150ft).  Intervention: IS q 1 hour while awake and record best IS volume  Frequent IS use encouraged; max IS 1500, morning IS 1000.  Intervention: Graduated elastic stockings  BALJEET hose on  Intervention: Transfer to tele status, begin VS q 4 hours  Pt continues to be ICU status due to SBP < 90 on blood pressure cuff.  Day RN to discuss with CTS.
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
## 2017-03-23 NOTE — PROGRESS NOTES
Per Dr Mota, not to give bicarb as patient is expected to be acidotic. See ABG. Will hold initial dose until second ABG recorded.
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
## 2017-03-23 NOTE — PROGRESS NOTES
ELIS Naidu updated regarding patient's ST-elevation, three runs of PVC's (4-6 beats each), and potassium of 4.7 / magnesium of 2.5.  Orders received for toradol; orders placed.
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
## 2017-03-23 NOTE — PROGRESS NOTES
Inpatient Anticoagulation Service Note    Date: 3/23/2017  Reason for Anticoagulation: Bioprosthetic Valve Replacement (Aortic)        Hemoglobin Value: 12.6 (Results confirmed by repeat testing.)  Hematocrit Value: 37.5 (Results confirmed by repeat testing.)  Lab Platelet Value: 120  Target INR: 2.0 to 3.0    INR from last 7 days     Date/Time INR Value    03/23/17 0520 (!)1.39    03/22/17 1836 (!)1.48    03/21/17 1048 (!)1.15    03/20/17 0730 (!)1.17        Dose from last 7 days     Date/Time Dose (mg)    03/23/17 1400 5        Average Dose (mg):  (New Start)  Significant Interactions: Aspirin, NSAID, Statin  Bridge Therapy: No     Reversal Agent Administered: Not Applicable  Comments: Patient is a new start warfarin s/p AVR on 3/22.  INR today was 1.39.  Drug interactions noted.  Will initiate with warfarin 5 mg. Pharmacy will continue to monitor.    Plan:  Warfarin 5 mg. INR in am.  Education Material Provided?: No  Pharmacist suggested discharge dosing: Possibly warfarin 5 mg.  INR within 48 hours of discharge.     Maritza Harvey, PharmD.  PGY-1 Resident  [phone]

## 2017-03-23 NOTE — CARE PLAN
Problem: Nutritional:  Goal: Achieve adequate nutritional intake  Patient will have an understanding of diet education provided  Outcome: MET Date Met:  03/23/17  Provided pt with heart healthy diet education and handouts from the [employer] diet manual.   Also provided pt with contact info for outpatient nutrition services.

## 2017-03-24 LAB
ANION GAP SERPL CALC-SCNC: 5 MMOL/L (ref 0–11.9)
BUN SERPL-MCNC: 23 MG/DL (ref 8–22)
CALCIUM SERPL-MCNC: 7.6 MG/DL (ref 8.5–10.5)
CHLORIDE SERPL-SCNC: 108 MMOL/L (ref 96–112)
CO2 SERPL-SCNC: 25 MMOL/L (ref 20–33)
CREAT SERPL-MCNC: 0.74 MG/DL (ref 0.5–1.4)
ERYTHROCYTE [DISTWIDTH] IN BLOOD BY AUTOMATED COUNT: 37.6 FL (ref 35.9–50)
GFR SERPL CREATININE-BSD FRML MDRD: >60 ML/MIN/1.73 M 2
GLUCOSE BLD-MCNC: 100 MG/DL (ref 65–99)
GLUCOSE BLD-MCNC: 101 MG/DL (ref 65–99)
GLUCOSE SERPL-MCNC: 111 MG/DL (ref 65–99)
HCT VFR BLD AUTO: 34.1 % (ref 42–52)
HGB BLD-MCNC: 11.3 G/DL (ref 14–18)
INR PPP: 1.52 (ref 0.87–1.13)
MCH RBC QN AUTO: 28.3 PG (ref 27–33)
MCHC RBC AUTO-ENTMCNC: 33.1 G/DL (ref 33.7–35.3)
MCV RBC AUTO: 85.5 FL (ref 81.4–97.8)
PLATELET # BLD AUTO: 105 K/UL (ref 164–446)
PMV BLD AUTO: 11.6 FL (ref 9–12.9)
POTASSIUM SERPL-SCNC: 3.8 MMOL/L (ref 3.6–5.5)
PROTHROMBIN TIME: 18.8 SEC (ref 12–14.6)
RBC # BLD AUTO: 3.99 M/UL (ref 4.7–6.1)
SODIUM SERPL-SCNC: 138 MMOL/L (ref 135–145)
WBC # BLD AUTO: 8.7 K/UL (ref 4.8–10.8)

## 2017-03-24 PROCEDURE — A9270 NON-COVERED ITEM OR SERVICE: HCPCS | Performed by: INTERNAL MEDICINE

## 2017-03-24 PROCEDURE — 700102 HCHG RX REV CODE 250 W/ 637 OVERRIDE(OP): Performed by: INTERNAL MEDICINE

## 2017-03-24 PROCEDURE — 97162 PT EVAL MOD COMPLEX 30 MIN: CPT

## 2017-03-24 PROCEDURE — 97165 OT EVAL LOW COMPLEX 30 MIN: CPT

## 2017-03-24 PROCEDURE — 700102 HCHG RX REV CODE 250 W/ 637 OVERRIDE(OP): Performed by: NURSE PRACTITIONER

## 2017-03-24 PROCEDURE — 82962 GLUCOSE BLOOD TEST: CPT | Mod: 91

## 2017-03-24 PROCEDURE — A9270 NON-COVERED ITEM OR SERVICE: HCPCS | Performed by: NURSE PRACTITIONER

## 2017-03-24 PROCEDURE — 700111 HCHG RX REV CODE 636 W/ 250 OVERRIDE (IP): Performed by: NURSE PRACTITIONER

## 2017-03-24 PROCEDURE — 80048 BASIC METABOLIC PNL TOTAL CA: CPT

## 2017-03-24 PROCEDURE — G8978 MOBILITY CURRENT STATUS: HCPCS | Mod: CJ

## 2017-03-24 PROCEDURE — 99233 SBSQ HOSP IP/OBS HIGH 50: CPT | Mod: GC | Performed by: INTERNAL MEDICINE

## 2017-03-24 PROCEDURE — 700102 HCHG RX REV CODE 250 W/ 637 OVERRIDE(OP): Performed by: GENERAL ACUTE CARE HOSPITAL

## 2017-03-24 PROCEDURE — 770020 HCHG ROOM/CARE - TELE (206)

## 2017-03-24 PROCEDURE — 85610 PROTHROMBIN TIME: CPT

## 2017-03-24 PROCEDURE — 85027 COMPLETE CBC AUTOMATED: CPT

## 2017-03-24 PROCEDURE — G8979 MOBILITY GOAL STATUS: HCPCS | Mod: CI

## 2017-03-24 PROCEDURE — G8988 SELF CARE GOAL STATUS: HCPCS | Mod: CI

## 2017-03-24 PROCEDURE — 700112 HCHG RX REV CODE 229: Performed by: NURSE PRACTITIONER

## 2017-03-24 PROCEDURE — A9270 NON-COVERED ITEM OR SERVICE: HCPCS | Performed by: GENERAL ACUTE CARE HOSPITAL

## 2017-03-24 PROCEDURE — G8987 SELF CARE CURRENT STATUS: HCPCS | Mod: CJ

## 2017-03-24 PROCEDURE — 700111 HCHG RX REV CODE 636 W/ 250 OVERRIDE (IP): Performed by: CLINICAL NURSE SPECIALIST

## 2017-03-24 RX ORDER — WARFARIN SODIUM 5 MG/1
5 TABLET ORAL
Status: DISCONTINUED | OUTPATIENT
Start: 2017-03-25 | End: 2017-03-24

## 2017-03-24 RX ORDER — CALCIUM CARBONATE 500(1250)
500 TABLET ORAL ONCE
Status: COMPLETED | OUTPATIENT
Start: 2017-03-24 | End: 2017-03-24

## 2017-03-24 RX ADMIN — KETOROLAC TROMETHAMINE 30 MG: 30 INJECTION, SOLUTION INTRAMUSCULAR; INTRAVENOUS at 20:26

## 2017-03-24 RX ADMIN — KETOROLAC TROMETHAMINE 30 MG: 30 INJECTION, SOLUTION INTRAMUSCULAR; INTRAVENOUS at 01:41

## 2017-03-24 RX ADMIN — METOPROLOL TARTRATE 25 MG: 25 TABLET, FILM COATED ORAL at 13:45

## 2017-03-24 RX ADMIN — OXYCODONE HYDROCHLORIDE 5 MG: 5 TABLET ORAL at 12:34

## 2017-03-24 RX ADMIN — ATORVASTATIN CALCIUM 40 MG: 40 TABLET, FILM COATED ORAL at 20:27

## 2017-03-24 RX ADMIN — OXYCODONE HYDROCHLORIDE 5 MG: 5 TABLET ORAL at 17:50

## 2017-03-24 RX ADMIN — ASPIRIN 81 MG: 81 TABLET ORAL at 09:00

## 2017-03-24 RX ADMIN — DOCUSATE SODIUM 100 MG: 100 CAPSULE ORAL at 08:22

## 2017-03-24 RX ADMIN — OXYCODONE HYDROCHLORIDE 5 MG: 5 TABLET ORAL at 03:17

## 2017-03-24 RX ADMIN — KETOROLAC TROMETHAMINE 30 MG: 30 INJECTION, SOLUTION INTRAMUSCULAR; INTRAVENOUS at 08:22

## 2017-03-24 RX ADMIN — METOPROLOL TARTRATE 25 MG: 25 TABLET, FILM COATED ORAL at 08:22

## 2017-03-24 RX ADMIN — KETOROLAC TROMETHAMINE 30 MG: 30 INJECTION, SOLUTION INTRAMUSCULAR; INTRAVENOUS at 13:45

## 2017-03-24 RX ADMIN — METOPROLOL TARTRATE 25 MG: 25 TABLET, FILM COATED ORAL at 20:26

## 2017-03-24 RX ADMIN — MAGNESIUM SULFATE IN DEXTROSE 1 G: 10 INJECTION, SOLUTION INTRAVENOUS at 17:50

## 2017-03-24 RX ADMIN — CALCIUM CARBONATE 500 MG: 1250 TABLET ORAL at 12:34

## 2017-03-24 RX ADMIN — STANDARDIZED SENNA CONCENTRATE AND DOCUSATE SODIUM 1 TABLET: 8.6; 5 TABLET, FILM COATED ORAL at 20:26

## 2017-03-24 RX ADMIN — OXYCODONE HYDROCHLORIDE 5 MG: 5 TABLET ORAL at 06:33

## 2017-03-24 ASSESSMENT — PAIN SCALES - GENERAL
PAINLEVEL_OUTOF10: 3
PAINLEVEL_OUTOF10: 1
PAINLEVEL_OUTOF10: 4
PAINLEVEL_OUTOF10: 4
PAINLEVEL_OUTOF10: 1
PAINLEVEL_OUTOF10: 2
PAINLEVEL_OUTOF10: 1
PAINLEVEL_OUTOF10: 1
PAINLEVEL_OUTOF10: 2
PAINLEVEL_OUTOF10: 2
PAINLEVEL_OUTOF10: 4
PAINLEVEL_OUTOF10: 2
PAINLEVEL_OUTOF10: 2
PAINLEVEL_OUTOF10: 1
PAINLEVEL_OUTOF10: 2

## 2017-03-24 ASSESSMENT — ENCOUNTER SYMPTOMS
HEARTBURN: 0
PSYCHIATRIC NEGATIVE: 1
SHORTNESS OF BREATH: 0
WEIGHT LOSS: 0
LOSS OF CONSCIOUSNESS: 0
VOMITING: 0
HEADACHES: 0
COUGH: 1
INSOMNIA: 0
CONSTITUTIONAL NEGATIVE: 1
MUSCULOSKELETAL NEGATIVE: 1
GASTROINTESTINAL NEGATIVE: 1
CHILLS: 0
CARDIOVASCULAR NEGATIVE: 1
DEPRESSION: 0
CLAUDICATION: 0
RESPIRATORY NEGATIVE: 1
SPUTUM PRODUCTION: 1
EYES NEGATIVE: 1
DIZZINESS: 0
NEUROLOGICAL NEGATIVE: 1
NERVOUS/ANXIOUS: 0
FEVER: 0
NAUSEA: 0

## 2017-03-24 ASSESSMENT — GAIT ASSESSMENTS
GAIT LEVEL OF ASSIST: STAND BY ASSIST
DEVIATION: BRADYKINETIC;SHUFFLED GAIT
DISTANCE (FEET): 450

## 2017-03-24 ASSESSMENT — ACTIVITIES OF DAILY LIVING (ADL): TOILETING: INDEPENDENT
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
## 2017-03-24 NOTE — CARE PLAN
Problem: Post Op Day 1 CABG/Heart Valve Replacement  Goal: Optimal care of the post op CABG/heart valve replacement Post Op Day 1  Outcome: PROGRESSING AS EXPECTED  Pt up in chair for all meals and ambulated in rand 3 x for distances greater than 25 feet.  Bradley catheter removed. Surgical dressing removed.  Mediastinal chest tubes removed.  Pt uses IS every hour while awake.  Graduated elastic stockings in place, IVs saline locked.  Pt transferred to [facility] status.  Pt transitioned to SC insulin protocol.

## 2017-03-24 NOTE — FACE TO FACE
Face to Face Supporting Documentation - Home Health    The encounter with this patient was in whole or in part the primary reason for home health admission.    Date of encounter:   Patient:                    MRN:                       YOB: 2018  Flex Perrin  [id number]  1960     Home health to see patient for:  Skilled Nursing care for assessment, interventions & education    Skilled need for:  Surgical Aftercare wound care, INR draws, medication monitoring, disease education    Skilled nursing interventions to include:  Wound Care    Homebound status evidenced by:  Need the aid of supportive devices such as crutches, canes, wheelchairs or walkers or Have a condition such that leaving his or her home is medically contraindicated. Leaving home requires a considerable and taxing effort. There is a normal inability to leave the home.    Community Physician to provide follow up care: Pcp Pt States None     Optional Interventions? No      I certify the face to face encounter for this home health care referral meets the CMS requirements and the encounter/clinical assessment with the patient was, in whole, or in part, for the medical condition(s) listed above, which is the primary reason for home health care. Based on my clinical findings: the service(s) are medically necessary, support the need for home health care, and the homebound criteria are met.  I certify that this patient has had a face to face encounter by myself.  AMANUEL Julian. - NPI: [id number]
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
## 2017-03-24 NOTE — PROGRESS NOTES
Received report and assumed care. Pt up in chair, states that he is ready to get back to bed and that his pain at sternal incision is minimal at this time except when he coughs.  VSS.  Pt returned to bed with standby assist and plan of care discussed.  Pt states no further questions or concerns at this time.

## 2017-03-24 NOTE — CARE PLAN
Problem: Post op day 2 CABG/Heart Valve Replacement  Goal: Optimal care of the post op CABG/heart valve replacement post op day 2  Intervention: FSBS: when 2 consecutive BS < 130 after post op day 2, discontinue FSBS unless patient is insulin dependent diabetic  Discontinued FSBS  Intervention: Daily Weights  Done with NOC RN  Intervention: Up in chair for all meals  Done.   Intervention: Ambulate, increasing the distance each time x 3 and before bed  Done.   Intervention: Stand at sink and wash up with assistance. Clean incisions twice daily with soap and water.  Done.   Intervention: IS q 1 hour while awake and record best IS volume  Best IS 1250  Intervention: Consider pacer wire removal by MD  To be removed tomorrow per MD  Intervention: Consider removal of rahman and chest tube if not already done  Done.
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
## 2017-03-24 NOTE — DISCHARGE INSTRUCTIONS
Discharge Instructions    Discharged to home by car with relative. Discharged via wheelchair, hospital escort: Yes.  Special equipment needed: Not Applicable    Be sure to schedule a follow-up appointment with your primary care doctor or any specialists as instructed.     Discharge Plan:   Influenza Vaccine Indication: Patient Refuses    I understand that a diet low in cholesterol, fat, and sodium is recommended for good health. Unless I have been given specific instructions below for another diet, I accept this instruction as my diet prescription.   Other diet: cardiac    Special Instructions: heart surgery and coumadin    Cardiac Surgery Discharge Instructions/[facility]    Activity:  1. NO driving for 4 weeks after surgery. You may ride as a passenger.  2. NO lifting of any item over 10 lbs (e.g. gallon of milk) for 6 weeks after surgery.  Do not raise both arms above head, only one at a time.  Do not push or pull with your arms.  3. Walk as much as possible! Walk a minimum of 4 times per day. Depending on your fatigue and comfort level, you may walk as much as you wish. There is no maximum.  4. Other physical activities (sex, housework, gardening, etc.) are OK after 4 weeks or after 8 weeks if you want to golf (start by putting, then advance to chipping, then to driving).  5. Continue using incentive spirometer for 2 weeks.  If you are going home on oxygen and you were not on oxygen prior to surgery, keep using until you are oxygen free.  6. Weigh daily and write it down starting  the next morning after you arrive home. Call your doctor for a weight gain of 2-4 lbs in 1-2 days.    Incision Care:  1. SHOWER EVERYDAY-no baths. Make sure to clean your incision(s) twice daily with plain, perfume and dye-free soap (if you shower in the morning, stand at the sink at bedtime and clean incision with soap and water). Then pat incision(s) dry with clean towel. Avoid creams or lotions on the incision(s).    2.  If there is any increase in redness or swelling, or separation of the incision line, or thick drainage* from any of the incisions, call [facility] ([phone]). * Clear, thin drainage is not abnormal especially from the leg incision and/or chest tube sites.                    3. Continue to wear your BALJEET Stockings for 4 weeks on the leg(s) with the incision(s) or swelling. You may take off the stocking(s) when in bed or when the leg(s) are elevated.    General Instructions:  1. If you are on the blood thinner Coumadin (warfarin), you will need your coumadin levels checked periodically.  2. You have been referred to Cardiac Rehab which is highly recommended for you after heart surgery. You can start Cardiac Rehab 30 days after surgery.  If you do not have an appointment at the time of discharge call [phone] to schedule an appointment.  3. Your Primary Care Doctor typically handles Home Oxygen. The Home Oxygen service that drops off your tanks should be checking your oxygen saturation levels. Oxygen may be stopped when you are > 90 % saturated on room air. Check with your Primary Care Doctor if you are unsure.    Prescription Refills/Questions:   Call your usual Pharmacy for ALL refills   1. Pain medication questions only: Call [facility] at [phone].  (Remember that refills may require additional physician approval and will need at least 24 hours’ notice. Please call for refills on Mondays through Fridays from 9 am to 4 pm).  2. For all other medications (except pain medication): Call your Cardiology Group or Primary Care Doctor (not [facility]) for refills or questions.    [facility] IS ALWAYS AVAILABLE TO ANSWER YOUR QUESTIONS. DO NOT HESITATE TO CALL!    · Is patient discharged on Warfarin / Coumadin?   Yes    You are on the blood thinner Coumadin (warfarin) and you will need your coumadin levels checked periodically. For any questions call the [facility] @ [phone]. The home health nurse will draw your blood and the coumadin clinic will call with any change to your dose.      IMPORTANT: HOW TO USE THIS INFORMATION:  This is a summary and does NOT have all possible information about this product. This information does not assure that this product is safe, effective, or appropriate for you. This information is not individual medical advice and does not substitute for the advice of your health care professional. Always ask your health care professional for complete information about this product and your specific health needs.      WARFARIN - ORAL (WARF-uh-rin)      COMMON BRAND NAME(S): Coumadin      WARNING:  Warfarin can cause very serious (possibly fatal) bleeding. This is more likely to occur when you first start taking this medication or if you take too much warfarin. To decrease your risk for bleeding, your doctor or other health care provider will monitor you closely and check your lab results (INR test) to make sure you are not taking too much warfarin. Keep all medical and laboratory appointments. Tell your doctor right away if you notice any signs of serious bleeding. See also Side Effects section.      USES:  This medication is used to treat blood clots (such as in deep vein thrombosis-DVT or pulmonary embolus-PE) and/or to prevent new clots from forming in your body. Preventing harmful blood clots helps to reduce the risk of a stroke or heart attack. Conditions that increase your risk of developing blood clots include a certain type of irregular heart rhythm (atrial fibrillation), heart valve replacement, recent heart attack, and certain surgeries (such as hip/knee replacement). Warfarin is commonly called a \"blood thinner,\" but the more correct term is \"anticoagulant.\" It helps to keep blood flowing smoothly in your body by decreasing the amount of certain substances (clotting proteins) in your blood.      HOW TO USE:  Read the Medication Guide " provided by your pharmacist before you start taking warfarin and each time you get a refill. If you have any questions, ask your doctor or pharmacist. Take this medication by mouth with or without food as directed by your doctor or other health care professional, usually once a day. It is very important to take it exactly as directed. Do not increase the dose, take it more frequently, or stop using it unless directed by your doctor. Dosage is based on your medical condition, laboratory tests (such as INR), and response to treatment. Your doctor or other health care provider will monitor you closely while you are taking this medication to determine the right dose for you. Use this medication regularly to get the most benefit from it. To help you remember, take it at the same time each day. It is important to eat a balanced, consistent diet while taking warfarin. Some foods can affect how warfarin works in your body and may affect your treatment and dose. Avoid sudden large increases or decreases in your intake of foods high in vitamin K (such as broccoli, cauliflower, cabbage, brussels sprouts, kale, spinach, and other green leafy vegetables, liver, green tea, certain vitamin supplements). If you are trying to lose weight, check with your doctor before you try to go on a diet. Cranberry products may also affect how your warfarin works. Limit the amount of cranberry juice (16 ounces/480 milliliters a day) or other cranberry products you may drink or eat.      SIDE EFFECTS:  Nausea, loss of appetite, or stomach/abdominal pain may occur. If any of these effects persist or worsen, tell your doctor or pharmacist promptly. Remember that your doctor has prescribed this medication because he or she has judged that the benefit to you is greater than the risk of side effects. Many people using this medication do not have serious side effects. This medication can cause serious bleeding if it affects your blood clotting proteins  too much (shown by unusually high INR lab results). Even if your doctor stops your medication, this risk of bleeding can continue for up to a week. Tell your doctor right away if you have any signs of serious bleeding, including: unusual pain/swelling/discomfort, unusual/easy bruising, prolonged bleeding from cuts or gums, persistent/frequent nosebleeds, unusually heavy/prolonged menstrual flow, pink/dark urine, coughing up blood, vomit that is bloody or looks like coffee grounds, severe headache, dizziness/fainting, unusual or persistent tiredness/weakness, bloody/black/tarry stools, chest pain, shortness of breath, difficulty swallowing. Tell your doctor right away if any of these unlikely but serious side effects occur: persistent nausea/vomiting, severe stomach/abdominal pain, yellowing eyes/skin. This drug rarely has caused very serious (possibly fatal) problems if its effects lead to small blood clots (usually at the beginning of treatment). This can lead to severe skin/tissue damage that may require surgery or amputation if left untreated. Patients with certain blood conditions (protein C or S deficiency) may be at greater risk. Get medical help right away if any of these rare but serious side effects occur: painful/red/purplish patches on the skin (such as on the toe, breast, abdomen), change in the amount of urine, vision changes, confusion, slurred speech, weakness on one side of the body. A very serious allergic reaction to this drug is rare. However, get medical help right away if you notice any symptoms of a serious allergic reaction, including: rash, itching/swelling (especially of the face/tongue/throat), severe dizziness, trouble breathing. This is not a complete list of possible side effects. If you notice other effects not listed above, contact your doctor or pharmacist. In the [place] - Call your doctor for medical advice about side effects. You may report side effects to FDA at 3-040-FDA-7268. In  [place] - Call your doctor for medical advice about side effects. You may report side effects to [facility] [place] at 1-157.645.3693.      PRECAUTIONS:  Before taking warfarin, tell your doctor or pharmacist if you are allergic to it; or if you have any other allergies. This product may contain inactive ingredients, which can cause allergic reactions or other problems. Talk to your pharmacist for more details. Before using this medication, tell your doctor or pharmacist your medical history, especially of: blood disorders (such as anemia, hemophilia), bleeding problems (such as bleeding of the stomach/intestines, bleeding in the brain), blood vessel disorders (such as aneurysms), recent major injury/surgery, liver disease, alcohol use, mental/mood disorders (including memory problems), frequent falls/injuries. It is important that all your doctors and dentists know that you take warfarin. Before having surgery or any medical/dental procedures, tell your doctor or dentist that you are taking this medication and about all the products you use (including prescription drugs, nonprescription drugs, and herbal products). Avoid getting injections into the muscles. If you must have an injection into a muscle (for example, a flu shot), it should be given in the arm. This way, it will be easier to check for bleeding and/or apply pressure bandages. This medication may cause stomach bleeding. Daily use of alcohol while using this medicine will increase your risk for stomach bleeding and may also affect how this medication works. Limit or avoid alcoholic beverages. If you have not been eating well, if you have an illness or infection that causes fever, vomiting, or diarrhea for more than 2 days, or if you start using any antibiotic medications, contact your doctor or pharmacist immediately because these conditions can affect how warfarin works. This medication can cause heavy bleeding. To lower the chance of getting cut, bruised, or  injured, use great caution with sharp objects like safety razors and nail cutters. Use an electric razor when shaving and a soft toothbrush when brushing your teeth. Avoid activities such as contact sports. If you fall or injure yourself, especially if you hit your head, call your doctor immediately. Your doctor may need to check you. The Food & Drug Administration has stated that generic warfarin products are interchangeable. However, consult your doctor or pharmacist before switching warfarin products. Be careful not to take more than one medication that contains warfarin unless specifically directed by the doctor or health care provider who is monitoring your warfarin treatment. Older adults may be at greater risk for bleeding while using this drug. This medication is not recommended for use during pregnancy because of serious (possibly fatal) harm to an unborn baby. Discuss the use of reliable forms of birth control with your doctor. If you become pregnant or think you may be pregnant, tell your doctor immediately. If you are planning pregnancy, discuss a plan for managing your condition with your doctor before you become pregnant. Your doctor may switch the type of medication you use during pregnancy. Very small amounts of this medication may pass into breast milk but is unlikely to harm a nursing infant. Consult your doctor before breast-feeding.      DRUG INTERACTIONS:  Drug interactions may change how your medications work or increase your risk for serious side effects. This document does not contain all possible drug interactions. Keep a list of all the products you use (including prescription/nonprescription drugs and herbal products) and share it with your doctor and pharmacist. Do not start, stop, or change the dosage of any medicines without your doctor's approval. Warfarin interacts with many prescription, nonprescription, vitamin, and herbal products. This includes medications that are applied to the  "skin or inside the vagina or rectum. The interactions with warfarin usually result in an increase or decrease in the \"blood-thinning\" (anticoagulant) effect. Your doctor or other health care professional should closely monitor you to prevent serious bleeding or clotting problems. While taking warfarin, it is very important to tell your doctor or pharmacist of any changes in medications, vitamins, or herbal products that you are taking. Some products that may interact with this drug include: capecitabine, imatinib, mifepristone. Aspirin, aspirin-like drugs (salicylates), and nonsteroidal anti-inflammatory drugs (NSAIDs such as ibuprofen, naproxen, celecoxib) may have effects similar to warfarin. These drugs may increase the risk of bleeding problems if taken during treatment with warfarin. Carefully check all prescription/nonprescription product labels (including drugs applied to the skin such as pain-relieving creams) since the products may contain NSAIDs or salicylates. Talk to your doctor about using a different medication (such as acetaminophen) to treat pain/fever. Low-dose aspirin and related drugs (such as clopidogrel, ticlopidine) should be continued if prescribed by your doctor for specific medical reasons such as heart attack or stroke prevention. Consult your doctor or pharmacist for more details. Many herbal products interact with warfarin. Tell your doctor before taking any herbal products, especially bromelains, coenzyme Q10, cranberry, danshen, dong quai, fenugreek, garlic, ginkgo biloba, ginseng, and [place]'s wort, among others. This medication may interfere with a certain laboratory test to measure theophylline levels, possibly causing false test results. Make sure laboratory personnel and all your doctors know you use this drug.      OVERDOSE:  If overdose is suspected, contact a poison control center or emergency room immediately. [place] residents can call the [place] National Poison Hotline at " 1-776.760.1729. [place] residents can call a provincial poison control center. Symptoms of overdose may include: bloody/black/tarry stools, pink/dark urine, unusual/prolonged bleeding.      NOTES:  Do not share this medication with others. Laboratory and/or medical tests (such as INR, complete blood count) must be performed periodically to monitor your progress or check for side effects. Consult your doctor for more details.      MISSED DOSE:  For the best possible benefit, do not miss any doses. If you do miss a dose and remember on the same day, take it as soon as you remember. If you remember on the next day, skip the missed dose and resume your usual dosing schedule. Do not double the dose to catch up because this could increase your risk for bleeding. Keep a record of missed doses to give to your doctor or pharmacist. Contact your doctor or pharmacist if you miss 2 or more doses in a row.      STORAGE:  Store at room temperature away from light and moisture. Do not store in the bathroom. Keep all medications away from children and pets. Do not flush medications down the toilet or pour them into a drain unless instructed to do so. Properly discard this product when it is  or no longer needed. Consult your pharmacist or local waste disposal company for more details about how to safely discard your product.      MEDICAL ALERT:  Your condition and medication can cause complications in a medical emergency. For information about enrolling in MedicAlert, call 1-797.537.6706 ([place]) or 1-909.856.1351 ([place]).      Information last revised 2010 Copyright(c) 2010 [employer]      · Is patient Post Blood Transfusion?  No    Depression / Suicide Risk    As you are discharged from this [facility] facility, it is important to learn how to keep safe from harming yourself.    Recognize the warning signs:  · Abrupt changes in personality, positive or negative- including increase in energy   · Giving away  possessions  · Change in eating patterns- significant weight changes-  positive or negative  · Change in sleeping patterns- unable to sleep or sleeping all the time   · Unwillingness or inability to communicate  · Depression  · Unusual sadness, discouragement and loneliness  · Talk of wanting to die  · Neglect of personal appearance   · Rebelliousness- reckless behavior  · Withdrawal from people/activities they love  · Confusion- inability to concentrate     If you or a loved one observes any of these behaviors or has concerns about self-harm, here's what you can do:  · Talk about it- your feelings and reasons for harming yourself  · Remove any means that you might use to hurt yourself (examples: pills, rope, extension cords, firearm)  · Get professional help from the community (Mental Health, Substance Abuse, psychological counseling)  · Do not be alone:Call your Safe Contact- someone whom you trust who will be there for you.  · Call your local CRISIS HOTLINE 384-1969 or 933-512-8073  · Call your local Children's Mobile Crisis Response Team [place] (639) 899-1937 or www.Everyclick  · Call the toll free National Suicide Prevention Hotlines   · National Suicide Prevention Lifeline 229-504-YMQP (2660)  · National Hope Line Network 800-SUICIDE (636-3686)
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
## 2017-03-24 NOTE — PROGRESS NOTES
Inpatient Anticoagulation Service Note    Date: 3/24/2017  Reason for Anticoagulation: Bioprosthetic Valve Replacement (Aortic)  Hemoglobin Value: 11.3  Hematocrit Value: 34.1  Lab Platelet Value: 105  Target INR: 2.0 to 3.0  INR from last 7 days     Date/Time INR Value    03/24/17 0325 (!)1.52    03/23/17 0520 (!)1.39    03/22/17 1836 (!)1.48    03/21/17 1048 (!)1.15    03/20/17 0730 (!)1.17        Dose from last 7 days     Date/Time Dose (mg)    03/24/17 1300 0    03/23/17 1400 5        Average Dose (mg):  (New Start)  Significant Interactions: Aspirin, NSAID, Statin  Bridge Therapy: No       Reversal Agent Administered: Not Applicable  Comments: INR increased overnight, remains subtherapeutic. No new drug interactions, H/H anemic with slight decrease - no overt bleeding noted. Dose to be held tonight per APN in anticipation of removal of pacer wires.     Plan:  Will follow up on INR tomorrow and follow clinical course for resumption of dosing as appropriate.     Education Material Provided?: No    Pharmacist suggested discharge dosing: ROXANNE Drake PharmD, BCPS
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
## 2017-03-24 NOTE — THERAPY
"Physical Therapy Evaluation completed.   Bed Mobility:  Supine to Sit:  (NT, in chair pre/post; has a recliner chair)  Transfers: Sit to Stand: Stand by Assist  Gait: Level Of Assist: Stand by Assist with no device  Plan of Care: Will benefit from Physical Therapy 3 times per week  Discharge Recommendations: Equipment: No Equipment Needed.   Pt presents with impaired gas exchange and activity tolerance s/p OHS. Pt is most limited by hypoxia, reaching 84% on 2L, though recovering quickly with standing rest breaks to 90-92%. Anticipate can return home when medically stable to do so, will have his father available PRN for IADLs. Will need one more session for stair mobility prior to d/c home. Pt would benefit from outpt cardiac rehab but has insurance barrier.   See \"Rehab Therapy-Acute\" Patient Summary Report for complete documentation.     "

## 2017-03-24 NOTE — PROGRESS NOTES
Monitor summary: SR 60-70 with CO: 0.18  QRS: 0.10  QT: 0.40  Paroxysmal accelerated junctional rhythm

## 2017-03-25 LAB
25(OH)D3 SERPL-MCNC: 20 NG/ML (ref 30–100)
ANION GAP SERPL CALC-SCNC: 5 MMOL/L (ref 0–11.9)
BUN SERPL-MCNC: 19 MG/DL (ref 8–22)
CALCIUM SERPL-MCNC: 7.8 MG/DL (ref 8.5–10.5)
CHLORIDE SERPL-SCNC: 108 MMOL/L (ref 96–112)
CO2 SERPL-SCNC: 26 MMOL/L (ref 20–33)
CREAT SERPL-MCNC: 0.69 MG/DL (ref 0.5–1.4)
ERYTHROCYTE [DISTWIDTH] IN BLOOD BY AUTOMATED COUNT: 37.6 FL (ref 35.9–50)
GFR SERPL CREATININE-BSD FRML MDRD: >60 ML/MIN/1.73 M 2
GLUCOSE SERPL-MCNC: 114 MG/DL (ref 65–99)
HCT VFR BLD AUTO: 32.1 % (ref 42–52)
HGB BLD-MCNC: 10.6 G/DL (ref 14–18)
INR PPP: 1.4 (ref 0.87–1.13)
MCH RBC QN AUTO: 28.3 PG (ref 27–33)
MCHC RBC AUTO-ENTMCNC: 33 G/DL (ref 33.7–35.3)
MCV RBC AUTO: 85.6 FL (ref 81.4–97.8)
PLATELET # BLD AUTO: 106 K/UL (ref 164–446)
PMV BLD AUTO: 11.4 FL (ref 9–12.9)
POTASSIUM SERPL-SCNC: 4.1 MMOL/L (ref 3.6–5.5)
PROTHROMBIN TIME: 17.6 SEC (ref 12–14.6)
RBC # BLD AUTO: 3.75 M/UL (ref 4.7–6.1)
SODIUM SERPL-SCNC: 139 MMOL/L (ref 135–145)
WBC # BLD AUTO: 6.6 K/UL (ref 4.8–10.8)

## 2017-03-25 PROCEDURE — 700112 HCHG RX REV CODE 229: Performed by: NURSE PRACTITIONER

## 2017-03-25 PROCEDURE — A9270 NON-COVERED ITEM OR SERVICE: HCPCS | Performed by: PHARMACIST

## 2017-03-25 PROCEDURE — A9270 NON-COVERED ITEM OR SERVICE: HCPCS | Performed by: NURSE PRACTITIONER

## 2017-03-25 PROCEDURE — 700102 HCHG RX REV CODE 250 W/ 637 OVERRIDE(OP): Performed by: INTERNAL MEDICINE

## 2017-03-25 PROCEDURE — 700102 HCHG RX REV CODE 250 W/ 637 OVERRIDE(OP): Performed by: NURSE PRACTITIONER

## 2017-03-25 PROCEDURE — 80048 BASIC METABOLIC PNL TOTAL CA: CPT

## 2017-03-25 PROCEDURE — 99233 SBSQ HOSP IP/OBS HIGH 50: CPT | Mod: GC | Performed by: INTERNAL MEDICINE

## 2017-03-25 PROCEDURE — 770020 HCHG ROOM/CARE - TELE (206)

## 2017-03-25 PROCEDURE — 85610 PROTHROMBIN TIME: CPT

## 2017-03-25 PROCEDURE — 700102 HCHG RX REV CODE 250 W/ 637 OVERRIDE(OP): Performed by: PHARMACIST

## 2017-03-25 PROCEDURE — 700111 HCHG RX REV CODE 636 W/ 250 OVERRIDE (IP): Performed by: CLINICAL NURSE SPECIALIST

## 2017-03-25 PROCEDURE — 700111 HCHG RX REV CODE 636 W/ 250 OVERRIDE (IP): Performed by: NURSE PRACTITIONER

## 2017-03-25 PROCEDURE — 82306 VITAMIN D 25 HYDROXY: CPT

## 2017-03-25 PROCEDURE — A9270 NON-COVERED ITEM OR SERVICE: HCPCS | Performed by: INTERNAL MEDICINE

## 2017-03-25 PROCEDURE — 85027 COMPLETE CBC AUTOMATED: CPT

## 2017-03-25 RX ORDER — WARFARIN SODIUM 5 MG/1
5 TABLET ORAL
Status: DISCONTINUED | OUTPATIENT
Start: 2017-03-25 | End: 2017-03-26

## 2017-03-25 RX ORDER — FUROSEMIDE 10 MG/ML
40 INJECTION INTRAMUSCULAR; INTRAVENOUS 2 TIMES DAILY
Status: DISCONTINUED | OUTPATIENT
Start: 2017-03-25 | End: 2017-03-27 | Stop reason: HOSPADM

## 2017-03-25 RX ORDER — POTASSIUM CHLORIDE 20 MEQ/1
20 TABLET, EXTENDED RELEASE ORAL 2 TIMES DAILY
Status: DISCONTINUED | OUTPATIENT
Start: 2017-03-25 | End: 2017-03-27 | Stop reason: HOSPADM

## 2017-03-25 RX ORDER — CALCIUM CARBONATE 500(1250)
500 TABLET ORAL 2 TIMES DAILY WITH MEALS
Status: DISCONTINUED | OUTPATIENT
Start: 2017-03-25 | End: 2017-03-27 | Stop reason: HOSPADM

## 2017-03-25 RX ADMIN — CALCIUM CARBONATE 500 MG: 1250 TABLET ORAL at 18:10

## 2017-03-25 RX ADMIN — WARFARIN SODIUM 5 MG: 5 TABLET ORAL at 18:10

## 2017-03-25 RX ADMIN — OXYCODONE HYDROCHLORIDE 5 MG: 5 TABLET ORAL at 05:14

## 2017-03-25 RX ADMIN — METOPROLOL TARTRATE 25 MG: 25 TABLET, FILM COATED ORAL at 05:06

## 2017-03-25 RX ADMIN — FUROSEMIDE 40 MG: 10 INJECTION, SOLUTION INTRAVENOUS at 20:01

## 2017-03-25 RX ADMIN — STANDARDIZED SENNA CONCENTRATE AND DOCUSATE SODIUM 1 TABLET: 8.6; 5 TABLET, FILM COATED ORAL at 20:01

## 2017-03-25 RX ADMIN — DOCUSATE SODIUM 100 MG: 100 CAPSULE ORAL at 07:51

## 2017-03-25 RX ADMIN — KETOROLAC TROMETHAMINE 30 MG: 30 INJECTION, SOLUTION INTRAMUSCULAR; INTRAVENOUS at 07:50

## 2017-03-25 RX ADMIN — FUROSEMIDE 40 MG: 10 INJECTION, SOLUTION INTRAVENOUS at 09:46

## 2017-03-25 RX ADMIN — TRAMADOL HYDROCHLORIDE 50 MG: 50 TABLET, FILM COATED ORAL at 18:10

## 2017-03-25 RX ADMIN — ASPIRIN 81 MG: 81 TABLET ORAL at 07:51

## 2017-03-25 RX ADMIN — TRAMADOL HYDROCHLORIDE 50 MG: 50 TABLET, FILM COATED ORAL at 09:54

## 2017-03-25 RX ADMIN — ATORVASTATIN CALCIUM 40 MG: 40 TABLET, FILM COATED ORAL at 20:00

## 2017-03-25 RX ADMIN — KETOROLAC TROMETHAMINE 30 MG: 30 INJECTION, SOLUTION INTRAMUSCULAR; INTRAVENOUS at 20:01

## 2017-03-25 RX ADMIN — METOPROLOL TARTRATE 25 MG: 25 TABLET, FILM COATED ORAL at 14:05

## 2017-03-25 RX ADMIN — POTASSIUM CHLORIDE 20 MEQ: 1500 TABLET, EXTENDED RELEASE ORAL at 20:01

## 2017-03-25 RX ADMIN — METOPROLOL TARTRATE 25 MG: 25 TABLET, FILM COATED ORAL at 21:49

## 2017-03-25 RX ADMIN — POTASSIUM CHLORIDE 20 MEQ: 1500 TABLET, EXTENDED RELEASE ORAL at 09:46

## 2017-03-25 RX ADMIN — KETOROLAC TROMETHAMINE 30 MG: 30 INJECTION, SOLUTION INTRAMUSCULAR; INTRAVENOUS at 14:05

## 2017-03-25 RX ADMIN — VITAMIN D, TAB 1000IU (100/BT) 2000 UNITS: 25 TAB at 12:45

## 2017-03-25 ASSESSMENT — ENCOUNTER SYMPTOMS
NEUROLOGICAL NEGATIVE: 1
FEVER: 0
PSYCHIATRIC NEGATIVE: 1
VOMITING: 0
GASTROINTESTINAL NEGATIVE: 1
HEARTBURN: 0
HEADACHES: 0
CARDIOVASCULAR NEGATIVE: 1
NAUSEA: 0
RESPIRATORY NEGATIVE: 1
SHORTNESS OF BREATH: 0
CHILLS: 0
COUGH: 1
CONSTITUTIONAL NEGATIVE: 1
SPUTUM PRODUCTION: 1
MUSCULOSKELETAL NEGATIVE: 1
EYES NEGATIVE: 1
CLAUDICATION: 0

## 2017-03-25 ASSESSMENT — PAIN SCALES - GENERAL
PAINLEVEL_OUTOF10: 0
PAINLEVEL_OUTOF10: 0
PAINLEVEL_OUTOF10: 2
PAINLEVEL_OUTOF10: 2
PAINLEVEL_OUTOF10: 4
PAINLEVEL_OUTOF10: 0
PAINLEVEL_OUTOF10: 2
PAINLEVEL_OUTOF10: 0
PAINLEVEL_OUTOF10: 0
PAINLEVEL_OUTOF10: 1
PAINLEVEL_OUTOF10: 1
PAINLEVEL_OUTOF10: 4
PAINLEVEL_OUTOF10: 2
PAINLEVEL_OUTOF10: 1
PAINLEVEL_OUTOF10: 3
PAINLEVEL_OUTOF10: 1

## 2017-03-25 NOTE — DISCHARGE PLANNING
Spoke with patient and father about need for insurance information.  Father states patient has no insurance for post acute care needs. Patient did not have this information and appears to rely on father for all documentation needs.   Notified PFA Supervisor as they there was a  question as to why patient needed his father to provided this information.  Notified PFA of outcome.  Anticipate DC home this weekend with father who will stay with him for 2 months.  He will provided assistance for ADL's and transportation to Follow up medical appts.  Patient cleared by PT/OT for home.  Scheduled I/R draw 3/27/17 at 845 AM at [facility] for first draw.  Father provided with all this information.

## 2017-03-25 NOTE — CARE PLAN
"Problem: Post Op Day 3 CABG/Heart Valve replacement  Goal: Optimal care of the post op CABG/Heart Valve replacement post op day 3  Intervention: Shower daily and clean incisions twice daily with soap and water  Pt showered with minimal assistance, tolerated well, states \"I feel so much better\".  Intervention: Up in chair for all meals  Pt up in chair, states the bed \"is only tolerable for so long and then it gets uncomfortable\".  Pt plans to sit up for every meal.  Intervention: Ambulate, increasing the distance each time x 3 and before bed  Pt up independently in room and will walk in rand, has completed 2 walks this morning.  Intervention: IS q 1 hour while awake and record best IS volume  Using IS frequently and states understanding of importance of use to help increase and maintain 02 sats.  Intervention: Consider removal of rahman, chest tube and pacer wire if not already done  Pacer wire discontinued today by APRN.  Intervention: Case management and  for discharge needs  Daughter requested note be sent to her e-mail address, social work notified yesterday according to night shift report.  Have not been notified of progress of this request by social work or daughter.          "

## 2017-03-25 NOTE — PROGRESS NOTES
Pt walking in rand with RN, 02 sats >90% on room air after activity.  Pt showered independently and tolerated well.  On room air, pt has maintained 02 sats above 90% consistently without oxygen, will monitor saturations when asleep to assess if home 02 will be necessary.  Pain is under control with tramadol and oxycodone alternating as needed.  Pt educated about Jose Antonio hose and coumadin.

## 2017-03-25 NOTE — FLOWSHEET NOTE
03/25/17 0637   Events/Summary/Plan   Events/Summary/Plan IS DONE   Interdisciplinary Plan of Care-Goals (Indications)   Hyperinflation Protocol Indications Chest Trauma (follow Blunt Chest Protocol)   Education   Education Yes - Pt. / Family has been Instructed in use of Respiratory Equipment   Incentive Spirometry Group   Incentive Spirometry Instruction Yes   Breathing Exercises Yes   Incentive Spirometer Volume 1000 mL   Incentive Spirometer Date Last Changed 03/25/17   Incentive Spirometer Next Change Date (Q 30 Days) 04/24/17   Chest Exam   Respiration 18   Pulse 74   Heart Rate (Monitored) 70   Breath Sounds   RUL Breath Sounds Diminished   RML Breath Sounds Diminished   RLL Breath Sounds Diminished   GALILEO Breath Sounds Diminished   LLL Breath Sounds Diminished   Secretions   Cough Non Productive;Splinted   How Sputum Obtained Cough on Request   Sputum Amount Unable to Evaluate   Sputum Color Unable to Evaluate   Sputum Consistency Unable to Evaluate   Oxygen   Pulse Oximetry 89 %   O2 (LPM) 0   O2 (FiO2) 21   O2 Daily Delivery Respiratory  Room Air with O2 Available

## 2017-03-25 NOTE — PROGRESS NOTES
Temporary pacemaker wires removed by ELIS Sabillon, pacemaker returned to [facility] for cleaning.  Pt tolerated procedure with slight discomfort and small amount of drainage at site.

## 2017-03-25 NOTE — CARE PLAN
Problem: Post op day 2 CABG/Heart Valve Replacement  Goal: Optimal care of the post op CABG/heart valve replacement post op day 2  Intervention: FSBS: when 2 consecutive BS < 130 after post op day 2, discontinue FSBS unless patient is insulin dependent diabetic  FSBS previously d/c'd   Intervention: Daily Weights  Pt weighed in a.m.- 105.8kg   Intervention: Up in chair for all meals  Pt in chair for breakfast   Intervention: Ambulate, increasing the distance each time x 3 and before bed  Pt ambulated in hallway half way around unit w/ no assistive devices on 1L O2   Intervention: Stand at sink and wash up with assistance. Clean incisions twice daily with soap and water.  Incisions cleansed w/ soap and water   Intervention: IS q 1 hour while awake and record best IS volume  IS encouraged- self motivated/strong effort. Best value 1250   Intervention: Consider pacer wire removal by MD  Pacer wires in per MD order   Intervention: Consider removal of rahman and chest tube if not already done  Rahman and CT previously removed

## 2017-03-25 NOTE — CARE PLAN
Problem: Hyperinflation:  Goal: Prevent or improve atelectasis  Outcome: PROGRESSING AS EXPECTED  IS QID 1250ml
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
## 2017-03-25 NOTE — PROGRESS NOTES
"Reviewed AM medications with pt and pain control options, discussed different medications available as needed.  Pt denies pain at this time after 5 mg oxycodone earlier today.  Pt taking po well, no BM yet, states \"lots of gas I think\".  Pt remains attached to temporary pace maker, no break through paced beats reported overnight or this morning, pt remains in sinus rhythm on monitor.  "
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
## 2017-03-25 NOTE — PROGRESS NOTES
Inpatient Anticoagulation Service Note    Date: 3/25/2017  Reason for Anticoagulation: Bioprosthetic Valve Replacement (Aortic)  Hemoglobin Value: 10.6  Hematocrit Value: 32.1  Lab Platelet Value: 106  Target INR: 2.0 to 3.0  INR from last 7 days     Date/Time INR Value    03/25/17 0410 (!)1.4    03/24/17 0325 (!)1.52    03/23/17 0520 (!)1.39    03/22/17 1836 (!)1.48    03/21/17 1048 (!)1.15    03/20/17 0730 (!)1.17        Dose from last 7 days     Date/Time Dose (mg)    03/25/17 1300 5    03/24/17 1300 0    03/23/17 1400 5        Average Dose (mg):  (New Start)  Significant Interactions: Aspirin, NSAID, Statin  Bridge Therapy: No       Reversal Agent Administered: Not Applicable  Comments: Pacer wires removed today, okay to resume warfarin per APN. INR decreased slightly due to held dose - remains subtherapeutic. No new drug interactions, H/H anemic with slight decrease, no overt bleeding noted - will continue to follow.     Plan:  Will resume 5 mg daily dosing and follow up on INR tomorrow.     Education Material Provided?: No    Pharmacist suggested discharge dosing: ROXANNE Drake PharmD, BCPS

## 2017-03-25 NOTE — PROGRESS NOTES
Pt's daughter, Bridget Bowen, to need note for work for absence during father's hospital stay. To be emailed to [email or web address]

## 2017-03-26 LAB
ANION GAP SERPL CALC-SCNC: 4 MMOL/L (ref 0–11.9)
BUN SERPL-MCNC: 20 MG/DL (ref 8–22)
CALCIUM SERPL-MCNC: 8.2 MG/DL (ref 8.5–10.5)
CHLORIDE SERPL-SCNC: 107 MMOL/L (ref 96–112)
CO2 SERPL-SCNC: 28 MMOL/L (ref 20–33)
CREAT SERPL-MCNC: 0.68 MG/DL (ref 0.5–1.4)
EKG IMPRESSION: NORMAL
ERYTHROCYTE [DISTWIDTH] IN BLOOD BY AUTOMATED COUNT: 36.4 FL (ref 35.9–50)
GFR SERPL CREATININE-BSD FRML MDRD: >60 ML/MIN/1.73 M 2
GLUCOSE SERPL-MCNC: 108 MG/DL (ref 65–99)
HCT VFR BLD AUTO: 33 % (ref 42–52)
HGB BLD-MCNC: 11 G/DL (ref 14–18)
INR PPP: 1.34 (ref 0.87–1.13)
MCH RBC QN AUTO: 28.1 PG (ref 27–33)
MCHC RBC AUTO-ENTMCNC: 33.3 G/DL (ref 33.7–35.3)
MCV RBC AUTO: 84.4 FL (ref 81.4–97.8)
PLATELET # BLD AUTO: 114 K/UL (ref 164–446)
PMV BLD AUTO: 11.5 FL (ref 9–12.9)
POTASSIUM SERPL-SCNC: 3.9 MMOL/L (ref 3.6–5.5)
PROTHROMBIN TIME: 17 SEC (ref 12–14.6)
RBC # BLD AUTO: 3.91 M/UL (ref 4.7–6.1)
SODIUM SERPL-SCNC: 139 MMOL/L (ref 135–145)
WBC # BLD AUTO: 5.6 K/UL (ref 4.8–10.8)

## 2017-03-26 PROCEDURE — 99233 SBSQ HOSP IP/OBS HIGH 50: CPT | Mod: GC | Performed by: INTERNAL MEDICINE

## 2017-03-26 PROCEDURE — 93005 ELECTROCARDIOGRAM TRACING: CPT | Performed by: NURSE PRACTITIONER

## 2017-03-26 PROCEDURE — G8979 MOBILITY GOAL STATUS: HCPCS | Mod: CI

## 2017-03-26 PROCEDURE — 85027 COMPLETE CBC AUTOMATED: CPT

## 2017-03-26 PROCEDURE — A9270 NON-COVERED ITEM OR SERVICE: HCPCS | Performed by: NURSE PRACTITIONER

## 2017-03-26 PROCEDURE — 700102 HCHG RX REV CODE 250 W/ 637 OVERRIDE(OP): Performed by: PHARMACIST

## 2017-03-26 PROCEDURE — 700111 HCHG RX REV CODE 636 W/ 250 OVERRIDE (IP): Performed by: NURSE PRACTITIONER

## 2017-03-26 PROCEDURE — G8980 MOBILITY D/C STATUS: HCPCS | Mod: CI

## 2017-03-26 PROCEDURE — A9270 NON-COVERED ITEM OR SERVICE: HCPCS | Performed by: PHARMACIST

## 2017-03-26 PROCEDURE — 85610 PROTHROMBIN TIME: CPT

## 2017-03-26 PROCEDURE — 97116 GAIT TRAINING THERAPY: CPT

## 2017-03-26 PROCEDURE — 700102 HCHG RX REV CODE 250 W/ 637 OVERRIDE(OP): Performed by: NURSE PRACTITIONER

## 2017-03-26 PROCEDURE — 97535 SELF CARE MNGMENT TRAINING: CPT

## 2017-03-26 PROCEDURE — 770020 HCHG ROOM/CARE - TELE (206)

## 2017-03-26 PROCEDURE — 700102 HCHG RX REV CODE 250 W/ 637 OVERRIDE(OP): Performed by: INTERNAL MEDICINE

## 2017-03-26 PROCEDURE — 93010 ELECTROCARDIOGRAM REPORT: CPT | Performed by: INTERNAL MEDICINE

## 2017-03-26 PROCEDURE — A9270 NON-COVERED ITEM OR SERVICE: HCPCS | Performed by: INTERNAL MEDICINE

## 2017-03-26 PROCEDURE — 700111 HCHG RX REV CODE 636 W/ 250 OVERRIDE (IP): Performed by: CLINICAL NURSE SPECIALIST

## 2017-03-26 PROCEDURE — 700112 HCHG RX REV CODE 229: Performed by: NURSE PRACTITIONER

## 2017-03-26 PROCEDURE — 80048 BASIC METABOLIC PNL TOTAL CA: CPT

## 2017-03-26 RX ORDER — WARFARIN SODIUM 7.5 MG/1
7.5 TABLET ORAL
Status: DISCONTINUED | OUTPATIENT
Start: 2017-03-26 | End: 2017-03-27 | Stop reason: HOSPADM

## 2017-03-26 RX ADMIN — WARFARIN SODIUM 7.5 MG: 7.5 TABLET ORAL at 17:35

## 2017-03-26 RX ADMIN — TRAMADOL HYDROCHLORIDE 50 MG: 50 TABLET, FILM COATED ORAL at 17:40

## 2017-03-26 RX ADMIN — TRAMADOL HYDROCHLORIDE 50 MG: 50 TABLET, FILM COATED ORAL at 12:36

## 2017-03-26 RX ADMIN — FUROSEMIDE 40 MG: 10 INJECTION, SOLUTION INTRAVENOUS at 08:02

## 2017-03-26 RX ADMIN — POTASSIUM CHLORIDE 20 MEQ: 1500 TABLET, EXTENDED RELEASE ORAL at 08:03

## 2017-03-26 RX ADMIN — FUROSEMIDE 40 MG: 10 INJECTION, SOLUTION INTRAVENOUS at 20:19

## 2017-03-26 RX ADMIN — KETOROLAC TROMETHAMINE 30 MG: 30 INJECTION, SOLUTION INTRAMUSCULAR; INTRAVENOUS at 08:02

## 2017-03-26 RX ADMIN — DOCUSATE SODIUM 100 MG: 100 CAPSULE ORAL at 08:02

## 2017-03-26 RX ADMIN — ASPIRIN 81 MG: 81 TABLET ORAL at 08:03

## 2017-03-26 RX ADMIN — OXYCODONE HYDROCHLORIDE 5 MG: 5 TABLET ORAL at 20:19

## 2017-03-26 RX ADMIN — CALCIUM CARBONATE 500 MG: 1250 TABLET ORAL at 17:35

## 2017-03-26 RX ADMIN — KETOROLAC TROMETHAMINE 30 MG: 30 INJECTION, SOLUTION INTRAMUSCULAR; INTRAVENOUS at 02:06

## 2017-03-26 RX ADMIN — ATORVASTATIN CALCIUM 40 MG: 40 TABLET, FILM COATED ORAL at 20:19

## 2017-03-26 RX ADMIN — TRAMADOL HYDROCHLORIDE 50 MG: 50 TABLET, FILM COATED ORAL at 04:16

## 2017-03-26 RX ADMIN — POTASSIUM CHLORIDE 20 MEQ: 1500 TABLET, EXTENDED RELEASE ORAL at 20:19

## 2017-03-26 RX ADMIN — CALCIUM CARBONATE 500 MG: 1250 TABLET ORAL at 08:02

## 2017-03-26 RX ADMIN — VITAMIN D, TAB 1000IU (100/BT) 2000 UNITS: 25 TAB at 08:02

## 2017-03-26 ASSESSMENT — ENCOUNTER SYMPTOMS
NAUSEA: 0
SPUTUM PRODUCTION: 1
SHORTNESS OF BREATH: 0
CARDIOVASCULAR NEGATIVE: 1
HEADACHES: 0
NEUROLOGICAL NEGATIVE: 1
COUGH: 1
CLAUDICATION: 0
EYES NEGATIVE: 1
CONSTITUTIONAL NEGATIVE: 1
RESPIRATORY NEGATIVE: 1
HEARTBURN: 0
GASTROINTESTINAL NEGATIVE: 1
FEVER: 0
VOMITING: 0
MUSCULOSKELETAL NEGATIVE: 1
PSYCHIATRIC NEGATIVE: 1
CHILLS: 0

## 2017-03-26 ASSESSMENT — PAIN SCALES - GENERAL
PAINLEVEL_OUTOF10: 3
PAINLEVEL_OUTOF10: 3
PAINLEVEL_OUTOF10: 1
PAINLEVEL_OUTOF10: 0
PAINLEVEL_OUTOF10: 1
PAINLEVEL_OUTOF10: 0
PAINLEVEL_OUTOF10: 3
PAINLEVEL_OUTOF10: 1
PAINLEVEL_OUTOF10: 0
PAINLEVEL_OUTOF10: 3
PAINLEVEL_OUTOF10: 0
PAINLEVEL_OUTOF10: 1
PAINLEVEL_OUTOF10: 0

## 2017-03-26 ASSESSMENT — GAIT ASSESSMENTS
GAIT LEVEL OF ASSIST: INDEPENDENT
DISTANCE (FEET): 500

## 2017-03-26 NOTE — FLOWSHEET NOTE
03/25/17 1015   Events/Summary/Plan   Events/Summary/Plan IS Done    Interdisciplinary Plan of Care-Goals (Indications)   Hyperinflation Protocol Indications Chest Trauma (follow Blunt Chest Protocol)   Education   Education Yes - Pt. / Family has been Instructed in use of Respiratory Equipment   Incentive Spirometry Group   Incentive Spirometry Instruction Yes   Breathing Exercises Yes   Incentive Spirometer Volume 1250 mL   Incentive Spirometer Date Last Changed 03/25/17   Incentive Spirometer Next Change Date (Q 30 Days) 04/24/17   Chest Exam   Respiration 17   Pulse 69   Heart Rate (Monitored) 69   Breath Sounds   RUL Breath Sounds Diminished   RML Breath Sounds Diminished   RLL Breath Sounds Diminished   GALILEO Breath Sounds Diminished   LLL Breath Sounds Diminished   Secretions   Cough Non Productive;Splinted   How Sputum Obtained Cough on Request   Sputum Amount Unable to Evaluate   Sputum Color Unable to Evaluate   Sputum Consistency Unable to Evaluate   Oxygen   Pulse Oximetry 92 %   O2 (LPM) 1   O2 Daily Delivery Respiratory  Silicone Nasal Cannula
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
## 2017-03-26 NOTE — CARE PLAN
Problem: Post Op Day 3 CABG/Heart Valve replacement  Goal: Optimal care of the post op CABG/Heart Valve replacement post op day 3  Intervention: Daily Weights  Pt weighed in morning and documented   Intervention: Shower daily and clean incisions twice daily with soap and water  Shower done, incisions cleansed w/ soap and water   Intervention: Up in chair for all meals  Pt in chair for breakfast   Intervention: Ambulate, increasing the distance each time x 3 and before bed  Pt ambulated once before bed and again in a.m.- no assistance required, steady. Pt ambulated with and without O2 and O2 sat documented in Epic   Intervention: IS q 1 hour while awake and record best IS volume  IS encouraged- best value 1250   Intervention: Consider removal of rahman, chest tube and pacer wire if not already done  All previously removed
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
## 2017-03-26 NOTE — CARE PLAN
"Problem: Post Op Day 4 CABG/Heart Valve Replacement  Goal: Optimal care of the Post Op CABG/Heart Valve replacement Post Op Day 4  Intervention: Shower daily and clean incisions twice daily with soap and water  Pt declined shower today, using CHG wipes to clean up. Pt states \"I don't feel like I'm doing anything\".  Intervention: Up in chair for all meals  Pt up to chair during the day, feels more comfortable sitting up than lying in bed.  Intervention: Ambulate, increasing the distance each time x 3 and before bed  Pt has been walking multiple laps today, 3-5 with each attempt.  Pt tolerating activity well, feels that the activity is \"making him stronger\".  Intervention: IS q 1 hour while awake and record best IS volume  Pt technique corrected on IS and he is pulling 1500, able to maintain his 02 saturations > 90 on room air.  Using 1 liter 02 while sleeping with 02 sats 94%.    Intervention: Discharge Education   in to see pt today to discuss discharge plans.  Pt does not have insurance at this time and is concerned about paying for home oxygen.  Does not qualify for paid home health at this time.  Sister was at bedside during this conversation and will try to help with discharge planning through her employment.  Plan for discharge tomorrow.          "
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
## 2017-03-26 NOTE — PROGRESS NOTES
Discussed with pt plan for discharge today or tomorrow.  Pt required 02 overnight, home 02 request completed by night shift RN.  Pt off 02 at this time and maintaining 02 sats > 90 %.  Pt also converted to accelerated junctional rate during night shift, has been alternating between that and sinus rhythm, cardiac surgery team aware.  Pt has adequate pain control on scheduled Toradol and prn Ultram.  Pt oob independently in room and rand.  Will discuss plan with cardiac surgery team.

## 2017-03-26 NOTE — DISCHARGE PLANNING
Met with pt and bedside RN. Pt states he will have his father drive him for it IR draws. His father will also stay with him for the next 3 months provide any needed assistance. Pt states he does not need O2. He may need the O2 to sleep but will not know until he spends another night in the hospital. Pt is scheduled for an IR draw tomorrow at 08:45. Pt's appointment will need to be rescheduled before dc. Provided pt's family member a work excuse letter. EM to requested address. Placed copy in pt chart.

## 2017-03-26 NOTE — PROGRESS NOTES
Pt in and out of accelerated junctional rhythm w/ rate 70-80's. Stat EKG ordered. FIDE Sabillon notified. No new orders received, morning beta blocker held. Will continue to monitor pt closely
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
## 2017-03-26 NOTE — PROGRESS NOTES
Inpatient Anticoagulation Service Note    Date: 3/26/2017  Reason for Anticoagulation: Bioprosthetic Valve Replacement (Aortic)  Hemoglobin Value: 11  Hematocrit Value: 33  Lab Platelet Value: 114  Target INR: 2.0 to 3.0  INR from last 7 days     Date/Time INR Value    03/26/17 0411 (!)1.34    03/25/17 0410 (!)1.4    03/24/17 0325 (!)1.52    03/23/17 0520 (!)1.39    03/22/17 1836 (!)1.48    03/21/17 1048 (!)1.15    03/20/17 0730 (!)1.17        Dose from last 7 days     Date/Time Dose (mg)    03/26/17 1400 7.5    03/25/17 1300 5    03/24/17 1300 0    03/23/17 1400 5        Average Dose (mg):  (New Start)  Significant Interactions: Aspirin, Statin  Bridge Therapy: No       Reversal Agent Administered: Not Applicable  Comments: INR decreased overnight, remains subtherapeutic. No new drug interactions, H/H anemic but improved slightly, no overt bleeding noted.     Plan:  As upward INR trend would be anticipated by this point in therapy despite held dose, will increase dosing to 7.5 mg daily. Pharmacy will continue to follow INR trend for dosing adjustments as appropriate.     Education Material Provided?: No    Pharmacist suggested discharge dosing: ROXANNE Drake PharmD, BCPS

## 2017-03-26 NOTE — THERAPY
"Physical Therapy Treatment completed.   Bed Mobility:  Supine to Sit:  (plans to sleep in recliner - declined need to practice)  Transfers: Sit to Stand: Supervised  Gait: Level Of Assist: Independent with No Equipment Needed       Plan of Care: Patient with no further skilled PT needs in the acute care setting at this time  Discharge Recommendations: Equipment: No Equipment Needed. Post-acute therapy Discharge to home with outpatient or home health for additional skilled therapy services.     See \"Rehab Therapy-Acute\" Patient Summary Report for complete documentation.       Pt moving well and feels he can go home and do all necessary mobiltiy. pt will have 24/7 help at home from family memebers. pt seen up ambulating laps around the unit. reminded/educated pt on talk test and intensity of exercise s/p surgery. Discussed cardaic rehab and returning to mobility. pt SOB w/ walking on flat ground w/o o2 but 88-90%. pt 84% w/o O2 during stairs. AEducated pt on SI use and purpose. pt may need O2 at home since he must do 1 flight of stairs to enter home. no further need for skilled acute PT services. recommend homw w/ family assist.   "

## 2017-03-26 NOTE — PROGRESS NOTES
Pt ambulated with and without O2- on room air O2 sat 84-86, ambulating with 3LO2 sat ranges 90-92.   When sleeping O2 sat ranged from 86-88 on RA, while awake pt ranges 89-90

## 2017-03-27 ENCOUNTER — APPOINTMENT (OUTPATIENT)
Dept: RADIOLOGY | Facility: MEDICAL CENTER | Age: 57
DRG: 217 | End: 2017-03-27
Attending: NURSE PRACTITIONER
Payer: MEDICAID

## 2017-03-27 VITALS
RESPIRATION RATE: 20 BRPM | HEIGHT: 70 IN | DIASTOLIC BLOOD PRESSURE: 85 MMHG | OXYGEN SATURATION: 92 % | BODY MASS INDEX: 31.56 KG/M2 | SYSTOLIC BLOOD PRESSURE: 116 MMHG | TEMPERATURE: 98.6 F | WEIGHT: 220.46 LBS | HEART RATE: 104 BPM

## 2017-03-27 PROBLEM — Z79.01 LONG TERM (CURRENT) USE OF ANTICOAGULANTS: Status: ACTIVE | Noted: 2017-03-27

## 2017-03-27 PROBLEM — Z95.3 HISTORY OF HEART VALVE REPLACEMENT WITH BIOPROSTHETIC VALVE: Status: ACTIVE | Noted: 2017-03-27

## 2017-03-27 LAB
ANION GAP SERPL CALC-SCNC: 8 MMOL/L (ref 0–11.9)
BUN SERPL-MCNC: 19 MG/DL (ref 8–22)
CALCIUM SERPL-MCNC: 8.8 MG/DL (ref 8.5–10.5)
CHLORIDE SERPL-SCNC: 103 MMOL/L (ref 96–112)
CO2 SERPL-SCNC: 27 MMOL/L (ref 20–33)
CREAT SERPL-MCNC: 0.79 MG/DL (ref 0.5–1.4)
ERYTHROCYTE [DISTWIDTH] IN BLOOD BY AUTOMATED COUNT: 35.9 FL (ref 35.9–50)
GFR SERPL CREATININE-BSD FRML MDRD: >60 ML/MIN/1.73 M 2
GLUCOSE SERPL-MCNC: 114 MG/DL (ref 65–99)
HCT VFR BLD AUTO: 37.3 % (ref 42–52)
HGB BLD-MCNC: 12.3 G/DL (ref 14–18)
INR PPP: 1.56 (ref 0.87–1.13)
MCH RBC QN AUTO: 27.5 PG (ref 27–33)
MCHC RBC AUTO-ENTMCNC: 33 G/DL (ref 33.7–35.3)
MCV RBC AUTO: 83.4 FL (ref 81.4–97.8)
PLATELET # BLD AUTO: 188 K/UL (ref 164–446)
PMV BLD AUTO: 11 FL (ref 9–12.9)
POTASSIUM SERPL-SCNC: 4 MMOL/L (ref 3.6–5.5)
PROTHROMBIN TIME: 19.2 SEC (ref 12–14.6)
RBC # BLD AUTO: 4.47 M/UL (ref 4.7–6.1)
SODIUM SERPL-SCNC: 138 MMOL/L (ref 135–145)
WBC # BLD AUTO: 6.1 K/UL (ref 4.8–10.8)

## 2017-03-27 PROCEDURE — 700102 HCHG RX REV CODE 250 W/ 637 OVERRIDE(OP): Performed by: NURSE PRACTITIONER

## 2017-03-27 PROCEDURE — 85027 COMPLETE CBC AUTOMATED: CPT

## 2017-03-27 PROCEDURE — A9270 NON-COVERED ITEM OR SERVICE: HCPCS | Performed by: INTERNAL MEDICINE

## 2017-03-27 PROCEDURE — 700111 HCHG RX REV CODE 636 W/ 250 OVERRIDE (IP): Performed by: NURSE PRACTITIONER

## 2017-03-27 PROCEDURE — 71010 DX-CHEST-PORTABLE (1 VIEW): CPT

## 2017-03-27 PROCEDURE — 700102 HCHG RX REV CODE 250 W/ 637 OVERRIDE(OP): Performed by: INTERNAL MEDICINE

## 2017-03-27 PROCEDURE — 80048 BASIC METABOLIC PNL TOTAL CA: CPT

## 2017-03-27 PROCEDURE — A9270 NON-COVERED ITEM OR SERVICE: HCPCS | Performed by: NURSE PRACTITIONER

## 2017-03-27 PROCEDURE — 85610 PROTHROMBIN TIME: CPT

## 2017-03-27 PROCEDURE — 33282 HCHG CARDIAC LR INSERTION: CPT

## 2017-03-27 PROCEDURE — 99233 SBSQ HOSP IP/OBS HIGH 50: CPT | Mod: GC | Performed by: INTERNAL MEDICINE

## 2017-03-27 RX ORDER — POTASSIUM CHLORIDE 20 MEQ/1
20 TABLET, EXTENDED RELEASE ORAL DAILY
Qty: 30 TAB | Refills: 3 | Status: SHIPPED | OUTPATIENT
Start: 2017-03-27 | End: 2017-04-04

## 2017-03-27 RX ORDER — ATORVASTATIN CALCIUM 40 MG/1
40 TABLET, FILM COATED ORAL
Qty: 30 TAB | Refills: 3 | Status: SHIPPED | OUTPATIENT
Start: 2017-03-27 | End: 2017-04-04 | Stop reason: SDUPTHER

## 2017-03-27 RX ORDER — ASPIRIN 81 MG/1
81 TABLET ORAL DAILY
Qty: 30 TAB | Refills: 3 | Status: SHIPPED | OUTPATIENT
Start: 2017-03-27 | End: 2018-12-10 | Stop reason: CLARIF

## 2017-03-27 RX ORDER — OXYCODONE HYDROCHLORIDE 5 MG/1
5 TABLET ORAL
Qty: 60 TAB | Refills: 0 | Status: SHIPPED | OUTPATIENT
Start: 2017-03-27 | End: 2017-04-04

## 2017-03-27 RX ORDER — PSEUDOEPHEDRINE HCL 30 MG
100 TABLET ORAL EVERY MORNING
Qty: 60 CAP | Refills: 3 | Status: SHIPPED | OUTPATIENT
Start: 2017-03-27 | End: 2017-04-04

## 2017-03-27 RX ORDER — WARFARIN SODIUM 7.5 MG/1
7.5 TABLET ORAL
Qty: 30 TAB | Refills: 3 | Status: SHIPPED | OUTPATIENT
Start: 2017-03-27 | End: 2017-04-05

## 2017-03-27 RX ORDER — FUROSEMIDE 40 MG/1
40 TABLET ORAL DAILY
Qty: 30 TAB | Refills: 3 | Status: SHIPPED | OUTPATIENT
Start: 2017-03-27 | End: 2017-04-04

## 2017-03-27 RX ADMIN — POTASSIUM CHLORIDE 20 MEQ: 1500 TABLET, EXTENDED RELEASE ORAL at 08:31

## 2017-03-27 RX ADMIN — OXYCODONE HYDROCHLORIDE 5 MG: 5 TABLET ORAL at 04:19

## 2017-03-27 RX ADMIN — ASPIRIN 81 MG: 81 TABLET ORAL at 08:31

## 2017-03-27 RX ADMIN — CALCIUM CARBONATE 500 MG: 1250 TABLET ORAL at 08:31

## 2017-03-27 RX ADMIN — HYDROCODONE BITARTRATE AND ACETAMINOPHEN 1 TABLET: 5; 325 TABLET ORAL at 11:13

## 2017-03-27 RX ADMIN — HYDROCODONE BITARTRATE AND ACETAMINOPHEN 1 TABLET: 5; 325 TABLET ORAL at 15:53

## 2017-03-27 RX ADMIN — FUROSEMIDE 40 MG: 10 INJECTION, SOLUTION INTRAVENOUS at 08:31

## 2017-03-27 RX ADMIN — VITAMIN D, TAB 1000IU (100/BT) 2000 UNITS: 25 TAB at 08:31

## 2017-03-27 ASSESSMENT — PAIN SCALES - GENERAL
PAINLEVEL_OUTOF10: 2
PAINLEVEL_OUTOF10: 1
PAINLEVEL_OUTOF10: 0
PAINLEVEL_OUTOF10: 3
PAINLEVEL_OUTOF10: 1
PAINLEVEL_OUTOF10: 0

## 2017-03-27 ASSESSMENT — ENCOUNTER SYMPTOMS
MUSCULOSKELETAL NEGATIVE: 1
RESPIRATORY NEGATIVE: 1
CARDIOVASCULAR NEGATIVE: 1
EYES NEGATIVE: 1
NEUROLOGICAL NEGATIVE: 1
GASTROINTESTINAL NEGATIVE: 1
CONSTITUTIONAL NEGATIVE: 1
PSYCHIATRIC NEGATIVE: 1

## 2017-03-27 ASSESSMENT — LIFESTYLE VARIABLES: EVER_SMOKED: NEVER

## 2017-03-27 NOTE — DISCHARGE PLANNING
BSN spoke to patient who is now sorry.  He stated he will pay for home 02.   Quoted patient $180 through [facility].  Notified [facility].   Rescheduled IR draw for this patient tomorrow at 315 PM So [facility].  Notified CTS.
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
## 2017-03-27 NOTE — DISCHARGE PLANNING
"Received 02 for home order this AM.  Discussed with patient who does not have insurance coverage.  Original quote from [facility] was $145 monthly. [facility] stated they covered [place] area.   Patient stated he would pay with credit card.  Choice form faxed to [facility].  Call back from [facility] stating [facility] denied referral due to no 24/7 coverage in that area.  Called [facility] for additional referrals. I called [facility] and that would accept private pay for $250 monthly.  [facility]ed [facility] and they quoted $180.  Discussed with patient who became upset and belligerent.  He walked out of the room cursing.  I asked where he was going and he stated \"For a walk!\"  Notified CTS and [facility].     Patient originally scheduled for [facility] for first blood draw today at 845 AM.  Attempted 3 calls to [facility] schedulers and no one has returned my call.   "

## 2017-03-27 NOTE — CARE PLAN
Problem: Post Op Day 4 CABG/Heart Valve Replacement  Goal: Optimal care of the Post Op CABG/Heart Valve replacement Post Op Day 4  Intervention: Daily Weights  Pt weighed via stand-up scale- 100.0kg   Intervention: Shower daily and clean incisions twice daily with soap and water  Incisions cleansed w/ soap and water   Intervention: Up in chair for all meals  Pt in chair for breakfast   Intervention: Ambulate, increasing the distance each time x 3 and before bed  Pt ambulated 3x- 3 laps around unit each time   Intervention: IS q 1 hour while awake and record best IS volume  IS encouraged, strong effort/self motivated- best value 6187   Intervention: Consider removal of rahman, chest tube and pacer wire if not already done  All previously removed

## 2017-03-27 NOTE — DISCHARGE PLANNING
Received Choice form for DME services, referral has been sent to [facility] per the request of the patient and choice form.

## 2017-03-27 NOTE — PROGRESS NOTES
Discharge education provided, all questions answered to patient satisfaction.   Patient to be discharged with prescriptions, and all belongings

## 2017-03-27 NOTE — PROGRESS NOTES
O2 continuously monitored while pt sleeps- O2 sat never above 90%. Sat on RA ranges 81-87% while sleeping
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
## 2017-03-27 NOTE — FACE TO FACE
Face to Face Note  -  Durable Medical Equipment    Jenifer Solorio M.D. - NPI: [id number]  I certify that this patient is under my care and that they had a durable medical equipment(DME)face to face encounter by myself that meets the physician DME face-to-face encounter requirements with this patient on:    Date of encounter:   Patient:                    MRN:                       YOB: 2018  Flex Perrin  [id number]  1960     The encounter with the patient was in whole, or in part, for the following medical condition, which is the primary reason for durable medical equipment:  Other - nocturnal hypoxia    I certify that, based on my findings, the following durable medical equipment is medically necessary:  Oxygen.    HOME O2 Saturation Measurements:(Values must be present for Home Oxygen orders)  Room air sat at rest: 86-88  Room air sat with amb: 84  With liters of O2: 1, O2 sat at rest with O2: 94  With Liters of O2: 3, O2 sat with amb with O2 : 92  Is the patient mobile?: Yes  Desaturate to 79 % at night time    My Clinical findings support the need for the above equipment due to:  Hypoxia    Supporting Symptoms: Severe nocturnal hypoxia requiring oxygen at night time, may need outpatient sleep study
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
## 2017-03-27 NOTE — DISCHARGE PLANNING
Received denied notice from [facility], [facility] Non Covered service area and they do not do 24 hour patients in [place], [place]. BRETT Myers has been notified via phone.

## 2017-03-27 NOTE — CARE PLAN
Problem: Post Op Day 4 CABG/Heart Valve Replacement  Goal: Optimal care of the Post Op CABG/Heart Valve replacement Post Op Day 4  Intervention: Ambulate, increasing the distance each time x 3 and before bed  Up self, doing well  Intervention: Discharge Education  Provided, no further questions at this time      Problem: Pain Management  Goal: Pain level will decrease to patient’s comfort goal  Outcome: PROGRESSING AS EXPECTED  Pain assessed and documented per unit protocol and appropriate pharmacological and non-pharmacological interventions implemented. Reviewed pain goals with patient and family.
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
## 2017-03-28 ENCOUNTER — PATIENT OUTREACH (OUTPATIENT)
Dept: HEALTH INFORMATION MANAGEMENT | Facility: OTHER | Age: 57
End: 2017-03-28

## 2017-03-28 NOTE — PROGRESS NOTES
"03/28/2017 0932 - Discharge Outreach attempt - LM  03/28/2017 0946 - Received callback from patient - call completed  03/28/2017 1000 - Received callback from patient - states they can't get him into Coumadin clinic until next week - concerned about waiting til then. I called Coumadin clinic - they have open appts in [place] office for tomorrow - patient informed and told him to call now and schedule. States he will.   03/29/2017 1009 - Received call from patient - states he's at lab and \"they don't know anything\". Handed phone to lab tech who stated they had figured it out - states appt was made for lab instead of Coumadin clinic. States they will see him for Coumadin clinic now. No further questions.   "

## 2017-03-29 ENCOUNTER — ANTICOAGULATION VISIT (OUTPATIENT)
Dept: MEDICAL GROUP | Facility: PHYSICIAN GROUP | Age: 57
End: 2017-03-29
Payer: MEDICAID

## 2017-03-29 VITALS — HEART RATE: 60 BPM | SYSTOLIC BLOOD PRESSURE: 108 MMHG | DIASTOLIC BLOOD PRESSURE: 72 MMHG

## 2017-03-29 DIAGNOSIS — Z79.01 LONG TERM (CURRENT) USE OF ANTICOAGULANTS: ICD-10-CM

## 2017-03-29 DIAGNOSIS — Z95.3 BIOPROSTHETIC AORTIC VALVE REPLACEMENT DURING CURRENT HOSPITALIZATION: ICD-10-CM

## 2017-03-29 DIAGNOSIS — Z95.3 HISTORY OF HEART VALVE REPLACEMENT WITH BIOPROSTHETIC VALVE: ICD-10-CM

## 2017-03-29 DIAGNOSIS — Z95.3 S/P HEART VALVE REPLACEMENT WITH BIOPROSTHETIC VALVE: ICD-10-CM

## 2017-03-29 LAB — INR PPP: 2 (ref 2–3.5)

## 2017-03-29 PROCEDURE — 85610 PROTHROMBIN TIME: CPT | Performed by: FAMILY MEDICINE

## 2017-03-29 NOTE — PROGRESS NOTES
Anticoagulation Summary as of 3/29/2017     INR goal 2.0-3.0   Selected INR 2.0 (3/29/2017)   Maintenance plan 7.5 mg (7.5 mg x 1) every day   Weekly total 52.5 mg   Plan last modified FAIZA MaciasD (3/27/2017)   Next INR check 3/31/2017   Target end date 5/20/2017    Indications   History of heart valve replacement with bioprosthetic valve [Z95.4] [Z95.4]  Long term (current) use of anticoagulants [Z79.01] [Z79.01]         Anticoagulation Episode Summary     INR check location     Preferred lab     Send INR reminders to     Comments PROCEDURE PERFORMED:  Aortic valve replacement (27 mm Dior Intuity    pericardial valve followed by 27 mm Dior Perimount Magna pericardial Valve)      Anticoagulation Care Providers     Provider Role Specialty Phone number    Taisha Naidu A.P.N. Referring Cardiac Surgery [phone]    FAIZA MorelandD Responsible          Anticoagulation Patient Findings   Negatives Missed Doses, Extra Doses, Medication Changes, Antibiotic Use, Diet Changes, Dental/Other Procedures, Hospitalization, Bleeding Gums, Nose Bleeds, Blood in Urine, Blood in Stool, Any Bruising, Other Complaints        Pt is new to warfarin and new to RCC.  Discussed indication for warfarin therapy and INR goal range. Explained our services, hours of operation, warfarin therapy, potential SE, potential DI.. Discussed diet at length, with an emphasis on foods rich in vitamin K.  Discussed monitoring parameters, such as blood in urine, blood in stool, discussed what to do if a dose is missed, or suspected as missed.  Emphasized importance of compliance.  Discussed lifestyle choices of ETOH & smoking and its impact on therapy.       Pt denies any unusual s/s of bleeding, bruising, clotting or any changes to diet or medications.      Pt is a new referral from  for bioprosthetic valve placement in the aortic position 3- by dr. White. Pt will f/u with  next week.  Pt has been  anticoagulated for about 7 days.  Pt INR is therapeutic today.  Pt is to continue with current warfarin dosing regimen.  Follow up in 2 days.    FAIZA MorelandD    SO sent to lab
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
## 2017-03-31 ENCOUNTER — HOSPITAL ENCOUNTER (OUTPATIENT)
Dept: LAB | Facility: MEDICAL CENTER | Age: 57
End: 2017-03-31
Attending: NURSE PRACTITIONER
Payer: MEDICAID

## 2017-03-31 DIAGNOSIS — Z95.3 S/P HEART VALVE REPLACEMENT WITH BIOPROSTHETIC VALVE: ICD-10-CM

## 2017-03-31 LAB
INR PPP: 2.39 (ref 0.87–1.13)
PROTHROMBIN TIME: 26.8 SEC (ref 12–14.6)

## 2017-03-31 PROCEDURE — 36415 COLL VENOUS BLD VENIPUNCTURE: CPT

## 2017-03-31 PROCEDURE — 85610 PROTHROMBIN TIME: CPT

## 2017-04-01 ENCOUNTER — TELEPHONE (OUTPATIENT)
Dept: VASCULAR LAB | Facility: MEDICAL CENTER | Age: 57
End: 2017-04-01

## 2017-04-02 NOTE — TELEPHONE ENCOUNTER
Initial anticoag note and d/c summary reveiwed    Patient with recent ascending aortic aneurysm repair and bioprosthetic AVR.    Pending further recs from cards, Gera continue with indefinite asa and 3 months of anticoag with warfarin.      Will be seen in vascular care in future to determine surveillance of aneurysm repair.  In meantime will defer all other medical managent to [facility].    Michael Bloch, MD  Anticoagulation Clinic    Cc:  KEREN White MD

## 2017-04-04 ENCOUNTER — OFFICE VISIT (OUTPATIENT)
Dept: INTERNAL MEDICINE | Facility: MEDICAL CENTER | Age: 57
End: 2017-04-04
Payer: MEDICAID

## 2017-04-04 ENCOUNTER — OFFICE VISIT (OUTPATIENT)
Dept: CARDIOLOGY | Facility: CLINIC | Age: 57
End: 2017-04-04
Payer: MEDICAID

## 2017-04-04 VITALS
TEMPERATURE: 97.6 F | BODY MASS INDEX: 31.15 KG/M2 | HEART RATE: 88 BPM | DIASTOLIC BLOOD PRESSURE: 78 MMHG | RESPIRATION RATE: 20 BRPM | SYSTOLIC BLOOD PRESSURE: 128 MMHG | WEIGHT: 217.6 LBS | HEIGHT: 70 IN | OXYGEN SATURATION: 93 %

## 2017-04-04 VITALS
OXYGEN SATURATION: 89 % | SYSTOLIC BLOOD PRESSURE: 120 MMHG | BODY MASS INDEX: 31.07 KG/M2 | HEART RATE: 91 BPM | DIASTOLIC BLOOD PRESSURE: 72 MMHG | HEIGHT: 70 IN | WEIGHT: 217 LBS

## 2017-04-04 DIAGNOSIS — I35.0 SEVERE AORTIC STENOSIS: ICD-10-CM

## 2017-04-04 DIAGNOSIS — Z95.3 HISTORY OF HEART VALVE REPLACEMENT WITH BIOPROSTHETIC VALVE: ICD-10-CM

## 2017-04-04 DIAGNOSIS — I71.20 THORACIC AORTIC ANEURYSM WITHOUT RUPTURE (HCC): ICD-10-CM

## 2017-04-04 DIAGNOSIS — Z79.01 LONG TERM (CURRENT) USE OF ANTICOAGULANTS: ICD-10-CM

## 2017-04-04 PROCEDURE — 99204 OFFICE O/P NEW MOD 45 MIN: CPT | Mod: GC | Performed by: INTERNAL MEDICINE

## 2017-04-04 PROCEDURE — 99214 OFFICE O/P EST MOD 30 MIN: CPT | Performed by: INTERNAL MEDICINE

## 2017-04-04 RX ORDER — AMOXICILLIN 500 MG/1
2000 TABLET, FILM COATED ORAL
Qty: 4 TAB | Refills: 3 | Status: SHIPPED | OUTPATIENT
Start: 2017-04-04 | End: 2017-07-06

## 2017-04-04 RX ORDER — ATORVASTATIN CALCIUM 40 MG/1
40 TABLET, FILM COATED ORAL
Qty: 90 TAB | Refills: 3 | Status: SHIPPED | OUTPATIENT
Start: 2017-04-04 | End: 2017-07-06

## 2017-04-04 ASSESSMENT — ENCOUNTER SYMPTOMS
CHILLS: 0
LOSS OF CONSCIOUSNESS: 0
NAUSEA: 0
PND: 0
SORE THROAT: 0
BRUISES/BLEEDS EASILY: 0
SHORTNESS OF BREATH: 0
FEVER: 0
COUGH: 0
PALPITATIONS: 0
ABDOMINAL PAIN: 0
WEAKNESS: 0
CLAUDICATION: 0
DIZZINESS: 0
FALLS: 0
HEADACHES: 0
BLURRED VISION: 0
FOCAL WEAKNESS: 0

## 2017-04-04 ASSESSMENT — PATIENT HEALTH QUESTIONNAIRE - PHQ9: CLINICAL INTERPRETATION OF PHQ2 SCORE: 0

## 2017-04-04 ASSESSMENT — PAIN SCALES - GENERAL: PAINLEVEL: NO PAIN

## 2017-04-04 NOTE — PROGRESS NOTES
Subjective:   Flex Perrin is a 56 y.o. male who presents today for follow-up of his history of aortic valve replacement and thoracic aortic aneurysm repair in the setting of syncope last month    Recovering as expected is using good health prior to finding out about his severe valvular disease    His weight has stabilized to preoperative weight and he followed up with primary care this morning appropriately recommended to stop his diuretics        Past Medical History   Diagnosis Date   • Hypertension      Past Surgical History   Procedure Laterality Date   • Aortic valve replacement  3/22/2017     Procedure: AORTIC VALVE REPLACEMENT ;  Surgeon: Janel White M.D.;  Location: SURGERY [facility];  Service:    • Aortic ascending dissection  3/22/2017     Procedure: AORTIC ASCENDING ANEURYSM REPAIR;  Surgeon: Janel White M.D.;  Location: SURGERY [facility];  Service:    • Mark  3/22/2017     Procedure: MARK;  Surgeon: Janel White M.D.;  Location: SURGERY [facility];  Service:      Family History   Problem Relation Age of Onset   • Heart Disease Maternal Grandmother      History   Smoking status   • Former Smoker -- 1.00 packs/day for 20 years   • Types: Cigarettes   Smokeless tobacco   • Never Used     No Known Allergies  Outpatient Encounter Prescriptions as of 4/4/2017   Medication Sig Dispense Refill   • atorvastatin (LIPITOR) 40 MG Tab Take 1 Tab by mouth every bedtime. 90 Tab 3   • Amoxicillin 500 MG Tab Take 4 Tabs by mouth Once PRN (30 minutes prior to dental work) for up to 1 dose. 4 Tab 3   • aspirin EC 81 MG EC tablet Take 1 Tab by mouth every day. 30 Tab 3   • warfarin (COUMADIN) 7.5 MG Tab Take 1 Tab by mouth COUMADIN-DAILY. 30 Tab 3   • vitamin D 2000 UNIT Tab Take 1 Tab by mouth every day. 60 Tab 3   • [DISCONTINUED] oxycodone immediate-release (ROXICODONE) 5 MG Tab Take 1 Tab by mouth every 3 hours as needed (Moderate Pain (NRS Pain Scale 4-6; CPOT Pain Scale 3-5)). 60 Tab 0   •  "[DISCONTINUED] atorvastatin (LIPITOR) 40 MG Tab Take 1 Tab by mouth every bedtime. 30 Tab 3   • [DISCONTINUED] docusate sodium 100 MG Cap Take 100 mg by mouth every morning. 60 Cap 3   • [DISCONTINUED] potassium chloride SA (KDUR) 20 MEQ Tab CR Take 1 Tab by mouth every day. 30 Tab 3   • [DISCONTINUED] furosemide (LASIX) 40 MG Tab Take 1 Tab by mouth every day. 30 Tab 3     No facility-administered encounter medications on file as of 4/4/2017.     Review of Systems   Constitutional: Negative for fever and chills.   HENT: Negative for sore throat.    Eyes: Negative for blurred vision.   Respiratory: Negative for cough and shortness of breath.    Cardiovascular: Negative for chest pain, palpitations, claudication, leg swelling and PND.   Gastrointestinal: Negative for nausea and abdominal pain.   Musculoskeletal: Negative for falls.   Skin: Negative for rash.   Neurological: Negative for dizziness, focal weakness, loss of consciousness, weakness and headaches.   Endo/Heme/Allergies: Does not bruise/bleed easily.        Objective:   /72 mmHg  Pulse 91  Ht 1.778 m (5' 10\")  Wt 98.431 kg (217 lb)  BMI 31.14 kg/m2  SpO2 89%    Physical Exam   Constitutional: No distress.   HENT:   Mouth/Throat: Oropharynx is clear and moist.   Eyes: No scleral icterus.   Neck: Neck supple. No JVD present.   Cardiovascular: Normal rate, regular rhythm, normal heart sounds and intact distal pulses.  Exam reveals no gallop and no friction rub.    No murmur heard.  Pulmonary/Chest: Effort normal. He has no rales.   Abdominal: Soft. Bowel sounds are normal. There is no tenderness.   Musculoskeletal: He exhibits no edema.   Neurological: He is alert.   Skin: No rash noted. He is not diaphoretic.   Psychiatric: He has a normal mood and affect.     We reviewed the images of his testing in the hospital is preserved ejection fraction and appropriate aortic valve replacement with a bioprosthetic valve he had a trileaflet needed " valve    Assessment:     1. History of heart valve replacement with bioprosthetic valve [Z95.4]  atorvastatin (LIPITOR) 40 MG Tab    Amoxicillin 500 MG Tab    ECHOCARDIOGRAM COMP W/O CONT   2. Thoracic aortic aneurysm without rupture (CMS-HCC)  ECHOCARDIOGRAM COMP W/O CONT       Medical Decision Making:  Today's Assessment / Status / Plan:     It was my pleasure to meet with Mr. Perrin.    We discussed the appropriate care for his aortic valve replacement he should do quite well he will be doing cardiac rehab on his own he works as [employer]     Will get a baseline echocardiogram to follow-up on his aortic aneurysm repair consider further CT imaging depending on that    I will see Mr. Perrin back in 6 months time and encouraged him to follow up with us over the phone or e-mail using my MyChart as issues arise.    It is my pleasure to participate in the care of Mr. Perrin.  Please do not hesitate to contact me with questions or concerns.    Kendell Wilson MD PhD FACC  Cardiologist [facility]

## 2017-04-04 NOTE — PATIENT INSTRUCTIONS
Stop furosemide and potassium    Continue aspirin 81 mg    Coumadin for a total of three months    Monitor your blood pressure     Goal blood pressure is less than 130/90    Stay hydrated    Ultrasound in 6 months at [facility]    Antibiotics prior to dental procedures, wait about 6 monts for any dental cleaning
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
## 2017-04-05 ENCOUNTER — ANTICOAGULATION VISIT (OUTPATIENT)
Dept: MEDICAL GROUP | Facility: PHYSICIAN GROUP | Age: 57
End: 2017-04-05
Payer: MEDICAID

## 2017-04-05 VITALS — SYSTOLIC BLOOD PRESSURE: 120 MMHG | OXYGEN SATURATION: 91 % | DIASTOLIC BLOOD PRESSURE: 76 MMHG | HEART RATE: 71 BPM

## 2017-04-05 DIAGNOSIS — Z95.3 HISTORY OF HEART VALVE REPLACEMENT WITH BIOPROSTHETIC VALVE: ICD-10-CM

## 2017-04-05 DIAGNOSIS — Z79.01 LONG TERM (CURRENT) USE OF ANTICOAGULANTS: ICD-10-CM

## 2017-04-05 DIAGNOSIS — Z95.3 S/P AORTIC VALVE REPLACEMENT WITH BIOPROSTHETIC VALVE: ICD-10-CM

## 2017-04-05 LAB — INR PPP: 5 (ref 2–3.5)

## 2017-04-05 PROCEDURE — 85610 PROTHROMBIN TIME: CPT | Performed by: FAMILY MEDICINE

## 2017-04-05 RX ORDER — WARFARIN SODIUM 5 MG/1
5 TABLET ORAL DAILY
Qty: 90 TAB | Refills: 0 | Status: SHIPPED | OUTPATIENT
Start: 2017-04-05 | End: 2017-05-17
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
## 2017-04-05 NOTE — PROGRESS NOTES
Anticoagulation Summary as of 4/5/2017     INR goal 2.0-3.0   Selected INR 5.0! (4/5/2017)   Maintenance plan 5 mg (5 mg x 1) every day   Weekly total 35 mg   Plan last modified Dorcas Dos Santos PHARMD (4/5/2017)   Next INR check 4/12/2017   Target end date 5/20/2017    Indications   History of heart valve replacement with bioprosthetic valve [Z95.4] [Z95.4]  Long term (current) use of anticoagulants [Z79.01] [Z79.01]         Anticoagulation Episode Summary     INR check location     Preferred lab     Send INR reminders to     Comments PROCEDURE PERFORMED:  Aortic valve replacement (27 mm Dior Intuity    pericardial valve followed by 27 mm Dior Perimount Magna pericardial Valve)      Anticoagulation Care Providers     Provider Role Specialty Phone number    Taisha Naidu A.P.N. Referring Cardiac Surgery [phone]    Dorcas Dos Santos PHARMD Responsible          Anticoagulation Patient Findings    Pt is supra therapeutic today.  Will HOLD warfarin for two days, and decrease weekly dose by ~30%.  Pt states his appetite remains poor. Ordered warfarin 5mg tablets at his preferred pharmacy. Follow up in 1 weeks.  Dorcas Dos Santos, FAIZAD

## 2017-04-12 ENCOUNTER — ANTICOAGULATION VISIT (OUTPATIENT)
Dept: MEDICAL GROUP | Facility: PHYSICIAN GROUP | Age: 57
End: 2017-04-12
Payer: MEDICAID

## 2017-04-12 VITALS — OXYGEN SATURATION: 95 % | SYSTOLIC BLOOD PRESSURE: 134 MMHG | HEART RATE: 71 BPM | DIASTOLIC BLOOD PRESSURE: 68 MMHG

## 2017-04-12 DIAGNOSIS — Z95.3 HISTORY OF HEART VALVE REPLACEMENT WITH BIOPROSTHETIC VALVE: ICD-10-CM

## 2017-04-12 DIAGNOSIS — Z79.01 LONG TERM (CURRENT) USE OF ANTICOAGULANTS: ICD-10-CM

## 2017-04-12 LAB — INR PPP: 1.7 (ref 2–3.5)

## 2017-04-12 PROCEDURE — 85610 PROTHROMBIN TIME: CPT | Performed by: FAMILY MEDICINE

## 2017-04-12 NOTE — PROGRESS NOTES
Anticoagulation Summary as of 4/12/2017     INR goal 2.0-3.0   Selected INR 1.7! (4/12/2017)   Maintenance plan 7.5 mg (5 mg x 1.5) on Wed, Sat; 5 mg (5 mg x 1) all other days   Weekly total 40 mg   Plan last modified Dorcas Dos Santos PHARMD (4/12/2017)   Next INR check 4/19/2017   Target end date 5/20/2017    Indications   History of heart valve replacement with bioprosthetic valve [Z95.4] [Z95.4]  Long term (current) use of anticoagulants [Z79.01] [Z79.01]         Anticoagulation Episode Summary     INR check location     Preferred lab     Send INR reminders to     Comments PROCEDURE PERFORMED:  Aortic valve replacement (27 mm Dior Intuity    pericardial valve followed by 27 mm Dior Perimount Magna pericardial Valve)      Anticoagulation Care Providers     Provider Role Specialty Phone number    AMANUEL Julian. Referring Cardiac Surgery [phone]    Dorcas Dos Santos PHARMD Responsible          Pt is sub therapeutic today.  After holding two doses, for a supra therapeutic INR.  Will increase weekly dose by 15%. Follow up in 1 weeks.    Dorcas Dos Santos PHARMD
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
## 2017-04-19 ENCOUNTER — ANTICOAGULATION VISIT (OUTPATIENT)
Dept: MEDICAL GROUP | Facility: PHYSICIAN GROUP | Age: 57
End: 2017-04-19
Payer: MEDICAID

## 2017-04-19 VITALS — SYSTOLIC BLOOD PRESSURE: 118 MMHG | DIASTOLIC BLOOD PRESSURE: 72 MMHG | HEART RATE: 64 BPM | OXYGEN SATURATION: 91 %

## 2017-04-19 DIAGNOSIS — Z95.3 HISTORY OF HEART VALVE REPLACEMENT WITH BIOPROSTHETIC VALVE: ICD-10-CM

## 2017-04-19 DIAGNOSIS — Z79.01 LONG TERM (CURRENT) USE OF ANTICOAGULANTS: ICD-10-CM

## 2017-04-19 LAB — INR PPP: 1.7 (ref 2–3.5)

## 2017-04-19 PROCEDURE — 85610 PROTHROMBIN TIME: CPT | Performed by: FAMILY MEDICINE
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
## 2017-04-19 NOTE — PROGRESS NOTES
Anticoagulation Summary as of 4/19/2017     INR goal 2.0-3.0   Selected INR 1.7! (4/19/2017)   Maintenance plan 7.5 mg (5 mg x 1.5) on Mon, Wed, Fri; 5 mg (5 mg x 1) all other days   Weekly total 42.5 mg   Plan last modified Dorcas Dos Santos PHARMD (4/19/2017)   Next INR check 4/26/2017   Target end date 5/20/2017    Indications   History of heart valve replacement with bioprosthetic valve [Z95.4] [Z95.4]  Long term (current) use of anticoagulants [Z79.01] [Z79.01]         Anticoagulation Episode Summary     INR check location     Preferred lab     Send INR reminders to     Comments PROCEDURE PERFORMED:  Aortic valve replacement (27 mm Dior Intuity    pericardial valve followed by 27 mm Dior Perimount Magna pericardial Valve)      Anticoagulation Care Providers     Provider Role Specialty Phone number    AMANUEL Julian. Referring Cardiac Surgery [phone]    Dorcas Dos Santos PHARMD Responsible          Pt remains sub therapeutic today.  Pt admits to eating more, his appetite has returned.  Will bolus dose with 10mg tonight, then increase weekly dose by 5%. Follow up in 1weeks.    Dorcas Dos Santos PHARMD

## 2017-04-21 ENCOUNTER — TELEPHONE (OUTPATIENT)
Dept: CARDIOLOGY | Facility: MEDICAL CENTER | Age: 57
End: 2017-04-21

## 2017-04-21 NOTE — TELEPHONE ENCOUNTER
LM for patient to call back.  Last visit was 4/4 and sat here was 89%.  Oxygen was most likely ordered by surgeons at discharge.  OK to DC?  To [facility].

## 2017-04-21 NOTE — TELEPHONE ENCOUNTER
----- Message from Ramandeep Ventura sent at 4/21/2017  3:58 PM PDT -----  Regarding: patient wants oxygen equipment picked up  [facility]      Patient wants order sent to have oxygen equipment picked up, he's not using it anymore. He can be reached at [phone].

## 2017-04-25 ENCOUNTER — TELEPHONE (OUTPATIENT)
Dept: INTERNAL MEDICINE | Facility: MEDICAL CENTER | Age: 57
End: 2017-04-25

## 2017-04-25 ENCOUNTER — TELEPHONE (OUTPATIENT)
Dept: CARDIOLOGY | Facility: MEDICAL CENTER | Age: 57
End: 2017-04-25

## 2017-04-25 DIAGNOSIS — I35.0 SEVERE AORTIC STENOSIS: ICD-10-CM

## 2017-04-25 NOTE — TELEPHONE ENCOUNTER
1. Caller Name: pt                      Call Back Number: [phone] (home)     2. Message: pt requesting an order to discontinue oxygen does not use it send D/C order to prefered home care    3. Patient approves office to leave a detailed voicemail/MyChart message: yes

## 2017-04-25 NOTE — TELEPHONE ENCOUNTER
----- Message from Claudette Luke sent at 4/25/2017  3:47 PM PDT -----  Regarding: Pt calling back  CW/Gilma,    Patient is calling back in regards to message left earlier about driving and lifting over 100 lbs. He is very eager, can be reached at [phone].

## 2017-04-25 NOTE — TELEPHONE ENCOUNTER
Kendell Wilson M.D.  Hayde Rojas R.N.        Caller: Unspecified (4 days ago, 4:10 PM)                     He should do an OPO off oxygen to see if he still needs O2 at night while his heart is recovering     Thank you            Previous          Patient informed and expressed anger and stated he is not doing an OPO, he is not using the oxygen, and has not used it inawhile and will take the tanks himself and return them to the oxygen company.

## 2017-04-25 NOTE — TELEPHONE ENCOUNTER
"Patient called back, extremely angry stating he needs to know ASAP when he can return to work and when he can drive.  He states no one is telling him anything and when he had his FV \"he didn't do anything, he didn't even take my blood pressure\".  Advised that his BP was recorded in his chart and that the MA's do this, not the doctors.  Apology given for his having a difficult time understanding his plan of care.    He also stated  told him he could take Aleve, but the \"doctor in [place] said absolutely not because it will thin his blood.  He earlier refused to do a repeat OPO to determine if his oxygen can be DC'd, stated he was just going to return the tanks to the oxygen company.  To CW MD to please advise when patient can drive, can he take Aleve,  and when he can return to work doing [employer].  His surgery was 5 weeks ago.    "

## 2017-04-26 ENCOUNTER — ANTICOAGULATION VISIT (OUTPATIENT)
Dept: MEDICAL GROUP | Facility: PHYSICIAN GROUP | Age: 57
End: 2017-04-26
Payer: MEDICAID

## 2017-04-26 VITALS — SYSTOLIC BLOOD PRESSURE: 122 MMHG | HEART RATE: 69 BPM | DIASTOLIC BLOOD PRESSURE: 68 MMHG | OXYGEN SATURATION: 95 %

## 2017-04-26 DIAGNOSIS — Z95.3 HISTORY OF HEART VALVE REPLACEMENT WITH BIOPROSTHETIC VALVE: ICD-10-CM

## 2017-04-26 DIAGNOSIS — Z79.01 LONG TERM (CURRENT) USE OF ANTICOAGULANTS: ICD-10-CM

## 2017-04-26 LAB — INR PPP: 2.2 (ref 2–3.5)

## 2017-04-26 PROCEDURE — 85610 PROTHROMBIN TIME: CPT | Performed by: FAMILY MEDICINE

## 2017-04-26 NOTE — TELEPHONE ENCOUNTER
You can do it under DME order.The order has to be printed and given to the MA to fax to pt's O2 company

## 2017-04-26 NOTE — TELEPHONE ENCOUNTER
"Kendell Wilson M.D.  Hayde Rojas R.N.        Caller: Unspecified (Yesterday, 4:49 PM)                     I had said he could go to work at out visit and that he should not drive for only 3 weeks after the surgery.       He can take aleve sparingly while on coumadin, tylenol is preferred     If he prefers please dc his oxygen order     Thank you       Patient called back, extremely angry stating he needs to know ASAP when he can return to work and when he can drive.  He states no one is telling him anything and when he had his FV \"he didn't do anything, he didn't even take my blood pressure\".  Advised that his BP was recorded in his chart and that the MA's do this, not the doctors.  Apology given for his having a difficult time understanding his plan of care.    He also stated  told him he could take Aleve, but the \"doctor in [place] said absolutely not because it will thin his blood.  He earlier refused to do a repeat OPO to determine if his oxygen can be DC'd, stated he was just going to return the tanks to the oxygen company.  To CW MD to please advise when patient can drive, can he take Aleve,  and when he can return to work doing [employer].  His surgery was 5 weeks ago.               Previous          Patient informed and apologized for his \"attitude\" yesterday and is thankful to know what he can do after surgery.  Oxygen DC order faxed to [facility].   "

## 2017-04-26 NOTE — PROGRESS NOTES
Anticoagulation Summary as of 4/26/2017     INR goal 2.0-3.0   Selected INR 2.2 (4/26/2017)   Maintenance plan 7.5 mg (5 mg x 1.5) on Mon, Wed, Fri; 5 mg (5 mg x 1) all other days   Weekly total 42.5 mg   Plan last modified Dorcas Dos Santos PHARMD (4/19/2017)   Next INR check 5/10/2017   Target end date 5/20/2017    Indications   History of heart valve replacement with bioprosthetic valve [Z95.4] [Z95.4]  Long term (current) use of anticoagulants [Z79.01] [Z79.01]         Anticoagulation Episode Summary     INR check location     Preferred lab     Send INR reminders to     Comments PROCEDURE PERFORMED:  Aortic valve replacement (27 mm Dior Intuity    pericardial valve followed by 27 mm Dior Perimount Magna pericardial Valve)      Anticoagulation Care Providers     Provider Role Specialty Phone number    AMANULE Julian. Referring Cardiac Surgery [phone]    Dorcas Dos Santos PHARMD Responsible          Anticoagulation Patient Findings   Negatives Missed Doses, Extra Doses, Medication Changes, Antibiotic Use, Diet Changes, Dental/Other Procedures, Hospitalization, Bleeding Gums, Nose Bleeds, Blood in Urine, Blood in Stool, Any Bruising, Other Complaints        Pt is therapeutic today. Pt is to continue with current warfarin dosing regimen.  Pt denies any unusual s/s of bleeding, bruising, clotting or any changes to diet or medications.  Follow up in 2 weeks.    FAIZA MorelandD
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
## 2017-04-27 ENCOUNTER — TELEPHONE (OUTPATIENT)
Dept: INTERNAL MEDICINE | Facility: MEDICAL CENTER | Age: 57
End: 2017-04-27

## 2017-05-10 ENCOUNTER — ANTICOAGULATION VISIT (OUTPATIENT)
Dept: MEDICAL GROUP | Facility: PHYSICIAN GROUP | Age: 57
End: 2017-05-10
Payer: MEDICAID

## 2017-05-10 VITALS — HEART RATE: 58 BPM | OXYGEN SATURATION: 97 % | SYSTOLIC BLOOD PRESSURE: 126 MMHG | DIASTOLIC BLOOD PRESSURE: 76 MMHG

## 2017-05-10 DIAGNOSIS — Z95.3 HISTORY OF HEART VALVE REPLACEMENT WITH BIOPROSTHETIC VALVE: ICD-10-CM

## 2017-05-10 DIAGNOSIS — Z79.01 LONG TERM (CURRENT) USE OF ANTICOAGULANTS: ICD-10-CM

## 2017-05-10 LAB — INR PPP: 1.4 (ref 2–3.5)

## 2017-05-10 PROCEDURE — 85610 PROTHROMBIN TIME: CPT | Performed by: FAMILY MEDICINE

## 2017-05-10 NOTE — PROGRESS NOTES
Anticoagulation Summary as of 5/10/2017     INR goal 2.0-3.0   Selected INR 1.4! (5/10/2017)   Maintenance plan 5 mg (5 mg x 1) on Sun, Thu; 7.5 mg (5 mg x 1.5) all other days   Weekly total 47.5 mg   Plan last modified Dorcas Dos Santos PHARMD (5/10/2017)   Next INR check 5/17/2017   Target end date 5/20/2017    Indications   History of heart valve replacement with bioprosthetic valve [Z95.4] [Z95.4]  Long term (current) use of anticoagulants [Z79.01] [Z79.01]         Anticoagulation Episode Summary     INR check location     Preferred lab     Send INR reminders to     Comments PROCEDURE PERFORMED:  Aortic valve replacement (27 mm Dior Intuity    pericardial valve followed by 27 mm Dior Perimount Magna pericardial Valve)      Anticoagulation Care Providers     Provider Role Specialty Phone number    YUSUF JulianP.N. Referring Cardiac Surgery [phone]    Dorcas Dos Santos PHARMD Responsible          Anticoagulation Patient Findings    Pt is inexplicably sub therapeutic today. Confirmed no missed doses, no increase in greens.  Will bolus with 10mg po qhs x2 and increase weekly dose by ~10%. Pt denies any unusual s/s of bleeding, bruising, clotting or any changes to diet or medications.  Follow up in 1 weeks.    Dorcas Dos Santos PHARMD
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
## 2017-05-17 ENCOUNTER — ANTICOAGULATION VISIT (OUTPATIENT)
Dept: MEDICAL GROUP | Facility: PHYSICIAN GROUP | Age: 57
End: 2017-05-17

## 2017-05-17 VITALS — OXYGEN SATURATION: 94 % | HEART RATE: 74 BPM | DIASTOLIC BLOOD PRESSURE: 7 MMHG | SYSTOLIC BLOOD PRESSURE: 110 MMHG

## 2017-05-17 DIAGNOSIS — Z79.01 LONG TERM (CURRENT) USE OF ANTICOAGULANTS: ICD-10-CM

## 2017-05-17 DIAGNOSIS — Z95.3 HISTORY OF HEART VALVE REPLACEMENT WITH BIOPROSTHETIC VALVE: ICD-10-CM

## 2017-05-17 LAB — INR PPP: 2.5 (ref 2–3.5)

## 2017-05-17 PROCEDURE — 85610 PROTHROMBIN TIME: CPT | Performed by: FAMILY MEDICINE

## 2017-05-17 RX ORDER — WARFARIN SODIUM 7.5 MG/1
7.5 TABLET ORAL DAILY
Qty: 60 TAB | Refills: 0 | Status: SHIPPED | OUTPATIENT
Start: 2017-05-17 | End: 2017-05-24

## 2017-05-17 RX ORDER — WARFARIN SODIUM 5 MG/1
5 TABLET ORAL DAILY
Qty: 30 TAB | Refills: 0 | Status: SHIPPED | OUTPATIENT
Start: 2017-05-17 | End: 2017-06-27
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
## 2017-05-17 NOTE — PROGRESS NOTES
Anticoagulation Summary as of 5/17/2017     INR goal 2.0-3.0   Selected INR 2.5 (5/17/2017)   Maintenance plan 5 mg (5 mg x 1) on Sun, Thu; 7.5 mg (5 mg x 1.5) all other days   Weekly total 47.5 mg   Plan last modified Dorcas Dos Santos PHARMD (5/10/2017)   Next INR check 5/31/2017   Target end date 5/20/2017    Indications   History of heart valve replacement with bioprosthetic valve [Z95.4] [Z95.4]  Long term (current) use of anticoagulants [Z79.01] [Z79.01]         Anticoagulation Episode Summary     INR check location     Preferred lab     Send INR reminders to     Comments PROCEDURE PERFORMED:  Aortic valve replacement (27 mm Dior Intuity    pericardial valve followed by 27 mm Dior Perimount Magna pericardial Valve)      Anticoagulation Care Providers     Provider Role Specialty Phone number    AMANUEL Julian. Referring Cardiac Surgery [phone]    Dorcas Dos Santos PHARMD Responsible          Anticoagulation Patient Findings   Negatives Missed Doses, Extra Doses, Medication Changes, Antibiotic Use, Diet Changes, Dental/Other Procedures, Hospitalization, Bleeding Gums, Nose Bleeds, Blood in Urine, Blood in Stool, Any Bruising, Other Complaints        Pt is therapeutic today. Pt is to continue with current warfarin dosing regimen.  Pt denies any unusual s/s of bleeding, bruising, clotting or any changes to diet or medications.  Follow up in 2 weeks.    FAIZA MorelandD

## 2017-05-24 ENCOUNTER — TELEPHONE (OUTPATIENT)
Dept: MEDICAL GROUP | Facility: PHYSICIAN GROUP | Age: 57
End: 2017-05-24

## 2017-05-24 ENCOUNTER — ANTICOAGULATION VISIT (OUTPATIENT)
Dept: MEDICAL GROUP | Facility: PHYSICIAN GROUP | Age: 57
End: 2017-05-24

## 2017-05-24 VITALS — OXYGEN SATURATION: 91 % | DIASTOLIC BLOOD PRESSURE: 62 MMHG | HEART RATE: 82 BPM | SYSTOLIC BLOOD PRESSURE: 118 MMHG

## 2017-05-24 DIAGNOSIS — Z95.3 HISTORY OF HEART VALVE REPLACEMENT WITH BIOPROSTHETIC VALVE: ICD-10-CM

## 2017-05-24 DIAGNOSIS — Z79.01 LONG TERM (CURRENT) USE OF ANTICOAGULANTS: ICD-10-CM

## 2017-05-24 LAB — INR PPP: 3.2 (ref 2–3.5)

## 2017-05-24 PROCEDURE — 85610 PROTHROMBIN TIME: CPT | Performed by: FAMILY MEDICINE
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
## 2017-05-24 NOTE — PROGRESS NOTES
Anticoagulation Summary as of 5/24/2017     INR goal 2.0-3.0   Selected INR 3.2! (5/24/2017)   Maintenance plan 5 mg (5 mg x 1) on Sun, Thu; 7.5 mg (5 mg x 1.5) all other days   Weekly total 47.5 mg   Plan last modified Dorcas Dos Santos PHARMD (5/10/2017)   Next INR check 6/14/2017   Target end date 5/20/2017    Indications   History of heart valve replacement with bioprosthetic valve [Z95.4] [Z95.4]  Long term (current) use of anticoagulants [Z79.01] [Z79.01]         Anticoagulation Episode Summary     INR check location     Preferred lab     Send INR reminders to     Comments PROCEDURE PERFORMED:  Aortic valve replacement (27 mm Dior Intuity    pericardial valve followed by 27 mm Dior Perimount Magna pericardial Valve)      Anticoagulation Care Providers     Provider Role Specialty Phone number    ZOEY Julian Referring Cardiac Surgery [phone]    Dorcas Dos Santos PHARMD Responsible          Anticoagulation Patient Findings   Negatives Missed Doses, Extra Doses, Medication Changes, Antibiotic Use, Diet Changes, Dental/Other Procedures, Hospitalization, Bleeding Gums, Nose Bleeds, Blood in Urine, Blood in Stool, Any Bruising, Other Complaints        Pt is supra therapeutic today. Will reduce tonight's dose to 5mg then continue with current dosing regimen. Pt denies any unusual s/s of bleeding, bruising, clotting or any changes to diet or medications.  Follow up in 3 weeks.    Dorcas Dos Santos PHARMD    Pt will f/u with vascular 6-26- 2017 at 3:40

## 2017-06-26 ENCOUNTER — TELEPHONE (OUTPATIENT)
Dept: VASCULAR LAB | Facility: MEDICAL CENTER | Age: 57
End: 2017-06-26

## 2017-06-27 ENCOUNTER — TELEPHONE (OUTPATIENT)
Dept: VASCULAR LAB | Facility: MEDICAL CENTER | Age: 57
End: 2017-06-27

## 2017-06-27 NOTE — TELEPHONE ENCOUNTER
Left voicemail message to discontinue warfarin and begin aspirin 81mg po daily.  Left contact information for patient to reschedule missed vascular appointment.  Dorcas Dos Santos, PHARMD

## 2017-06-28 ENCOUNTER — ANTICOAGULATION MONITORING (OUTPATIENT)
Dept: VASCULAR LAB | Facility: MEDICAL CENTER | Age: 57
End: 2017-06-28

## 2017-06-28 DIAGNOSIS — Z95.3 HISTORY OF HEART VALVE REPLACEMENT WITH BIOPROSTHETIC VALVE: ICD-10-CM

## 2017-06-28 DIAGNOSIS — Z79.01 LONG TERM (CURRENT) USE OF ANTICOAGULANTS: ICD-10-CM

## 2017-06-28 NOTE — PROGRESS NOTES
Discharged from [facility].  Anticoagulation completed 06/10/2017 by Dr. Bloch Linda M Filter, PHARMD

## 2017-07-06 ENCOUNTER — OFFICE VISIT (OUTPATIENT)
Dept: VASCULAR LAB | Facility: MEDICAL CENTER | Age: 57
End: 2017-07-06
Attending: INTERNAL MEDICINE

## 2017-07-06 VITALS
SYSTOLIC BLOOD PRESSURE: 133 MMHG | DIASTOLIC BLOOD PRESSURE: 76 MMHG | HEIGHT: 70 IN | HEART RATE: 72 BPM | BODY MASS INDEX: 31.35 KG/M2 | WEIGHT: 219 LBS

## 2017-07-06 DIAGNOSIS — Z95.3 BIOPROSTHETIC AORTIC VALVE REPLACEMENT DURING CURRENT HOSPITALIZATION: ICD-10-CM

## 2017-07-06 DIAGNOSIS — I71.20 THORACIC AORTIC ANEURYSM WITHOUT RUPTURE (HCC): ICD-10-CM

## 2017-07-06 PROCEDURE — 99212 OFFICE O/P EST SF 10 MIN: CPT

## 2017-07-06 PROCEDURE — 99204 OFFICE O/P NEW MOD 45 MIN: CPT | Performed by: INTERNAL MEDICINE

## 2017-07-06 ASSESSMENT — ENCOUNTER SYMPTOMS
SPEECH CHANGE: 0
WEIGHT LOSS: 0
PALPITATIONS: 0
BLURRED VISION: 0
DOUBLE VISION: 0
LOSS OF CONSCIOUSNESS: 0
SEIZURES: 0
BRUISES/BLEEDS EASILY: 0
GASTROINTESTINAL NEGATIVE: 1
MYALGIAS: 0
COUGH: 0
SHORTNESS OF BREATH: 0
NERVOUS/ANXIOUS: 0
HEADACHES: 0
FOCAL WEAKNESS: 0
DIZZINESS: 0
DEPRESSION: 0
CLAUDICATION: 0
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
## 2017-07-17 ENCOUNTER — HOSPITAL ENCOUNTER (OUTPATIENT)
Dept: RADIOLOGY | Facility: MEDICAL CENTER | Age: 57
End: 2017-07-17
Attending: INTERNAL MEDICINE

## 2017-07-17 DIAGNOSIS — Z95.3 BIOPROSTHETIC AORTIC VALVE REPLACEMENT DURING CURRENT HOSPITALIZATION: ICD-10-CM

## 2017-07-17 DIAGNOSIS — I71.20 THORACIC AORTIC ANEURYSM WITHOUT RUPTURE (HCC): ICD-10-CM

## 2017-07-17 PROCEDURE — 700117 HCHG RX CONTRAST REV CODE 255: Performed by: INTERNAL MEDICINE

## 2017-07-17 PROCEDURE — 74175 CTA ABDOMEN W/CONTRAST: CPT

## 2017-07-17 RX ADMIN — IOHEXOL 100 ML: 350 INJECTION, SOLUTION INTRAVENOUS at 14:23

## 2017-08-25 ENCOUNTER — OFFICE VISIT (OUTPATIENT)
Dept: CARDIOLOGY | Facility: CLINIC | Age: 57
End: 2017-08-25

## 2017-08-25 VITALS
HEART RATE: 81 BPM | OXYGEN SATURATION: 94 % | DIASTOLIC BLOOD PRESSURE: 70 MMHG | SYSTOLIC BLOOD PRESSURE: 120 MMHG | WEIGHT: 221 LBS | BODY MASS INDEX: 31.64 KG/M2 | HEIGHT: 70 IN

## 2017-08-25 DIAGNOSIS — I25.83 CORONARY ARTERY DISEASE DUE TO LIPID RICH PLAQUE: ICD-10-CM

## 2017-08-25 DIAGNOSIS — I25.10 CORONARY ARTERY DISEASE DUE TO LIPID RICH PLAQUE: ICD-10-CM

## 2017-08-25 DIAGNOSIS — Z79.01 LONG TERM (CURRENT) USE OF ANTICOAGULANTS: ICD-10-CM

## 2017-08-25 DIAGNOSIS — Z95.3 HISTORY OF HEART VALVE REPLACEMENT WITH BIOPROSTHETIC VALVE: ICD-10-CM

## 2017-08-25 DIAGNOSIS — E78.5 DYSLIPIDEMIA: ICD-10-CM

## 2017-08-25 DIAGNOSIS — I35.2 AORTIC VALVE STENOSIS WITH INSUFFICIENCY, UNSPECIFIED ETIOLOGY: ICD-10-CM

## 2017-08-25 DIAGNOSIS — I71.20 THORACIC AORTIC ANEURYSM WITHOUT RUPTURE (HCC): ICD-10-CM

## 2017-08-25 PROCEDURE — 99214 OFFICE O/P EST MOD 30 MIN: CPT | Performed by: INTERNAL MEDICINE

## 2017-08-25 RX ORDER — ATORVASTATIN CALCIUM 20 MG/1
20 TABLET, FILM COATED ORAL DAILY
Qty: 90 TAB | Refills: 3 | Status: SHIPPED | OUTPATIENT
Start: 2017-08-25 | End: 2018-12-10 | Stop reason: CLARIF

## 2017-08-25 ASSESSMENT — ENCOUNTER SYMPTOMS
COUGH: 0
FOCAL WEAKNESS: 0
SHORTNESS OF BREATH: 0
ABDOMINAL PAIN: 0
SORE THROAT: 0
CHILLS: 0
FALLS: 0
FEVER: 0
BLURRED VISION: 0
BRUISES/BLEEDS EASILY: 0
PND: 0
HEADACHES: 0
LOSS OF CONSCIOUSNESS: 0
WEAKNESS: 0
CLAUDICATION: 0
NAUSEA: 0
PALPITATIONS: 0
DIZZINESS: 0
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
## 2017-08-25 NOTE — PROGRESS NOTES
Subjective:   Flex Perrin is a 56 y.o. male who presents today For follow-up of his history of aortic valve replacement and ascending aortic aneurysm repair March 2017    He'll follow-up with vascular medicine who recommended CT and echocardiogram, unfortunately does not have insurance so had to pay out of pocket for his CT which fortunately shows stable repair of his aortic aneurysm    Past Medical History   Diagnosis Date   • Aneurysm of aorta (CMS-HCC)    • Aortic stenosis      Past Surgical History   Procedure Laterality Date   • Aortic valve replacement  3/22/2017     Procedure: AORTIC VALVE REPLACEMENT ;  Surgeon: Janel White M.D.;  Location: SURGERY [facility];  Service:    • Aortic ascending dissection  3/22/2017     Procedure: AORTIC ASCENDING ANEURYSM REPAIR;  Surgeon: Janel White M.D.;  Location: SURGERY [facility];  Service:    • Mark  3/22/2017     Procedure: MARK;  Surgeon: Janel White M.D.;  Location: SURGERY [facility];  Service:      Family History   Problem Relation Age of Onset   • Heart Disease Paternal Grandmother      anuerym and AVR     History   Smoking status   • Former Smoker -- 1.00 packs/day for 20 years   • Types: Cigarettes   Smokeless tobacco   • Never Used     No Known Allergies  Outpatient Encounter Prescriptions as of 8/25/2017   Medication Sig Dispense Refill   • aspirin EC 81 MG EC tablet Take 1 Tab by mouth every day. 30 Tab 3   • vitamin D 2000 UNIT Tab Take 1 Tab by mouth every day. 60 Tab 3     No facility-administered encounter medications on file as of 8/25/2017.     Review of Systems   Constitutional: Negative for fever and chills.   HENT: Negative for sore throat.    Eyes: Negative for blurred vision.   Respiratory: Negative for cough and shortness of breath.    Cardiovascular: Negative for chest pain, palpitations, claudication, leg swelling and PND.   Gastrointestinal: Negative for nausea and abdominal pain.   Musculoskeletal: Negative for falls.  "  Skin: Negative for rash.   Neurological: Negative for dizziness, focal weakness, loss of consciousness, weakness and headaches.   Endo/Heme/Allergies: Does not bruise/bleed easily.        Objective:   /70 mmHg  Pulse 81  Ht 1.778 m (5' 10\")  Wt 100.245 kg (221 lb)  BMI 31.71 kg/m2  SpO2 94%    Physical Exam   Constitutional: No distress.   HENT:   Mouth/Throat: Oropharynx is clear and moist.   Eyes: No scleral icterus.   Neck: Neck supple. No JVD present.   Cardiovascular: Normal rate, regular rhythm, normal heart sounds and intact distal pulses.  Exam reveals no gallop and no friction rub.    No murmur heard.  Pulmonary/Chest: Effort normal. He has no rales.   Abdominal: Soft. Bowel sounds are normal. There is no tenderness.   Musculoskeletal: He exhibits no edema.   Neurological: He is alert.   Skin: No rash noted. He is not diaphoretic.   Psychiatric: He has a normal mood and affect.     Labs show mild dyslipidemia with low HDL but also low LDL    We reviewed the images of his recent CTA which shows stable repair of his aneurysm    Assessment:     1. Thoracic aortic aneurysm without rupture (CMS-HCC)     2. Aortic valve stenosis with insufficiency, unspecified etiology     3. History of heart valve replacement with bioprosthetic valve [Z95.4]     4. Long term (current) use of anticoagulants [Z79.01]     5. Coronary artery disease due to lipid rich plaque     6. Dyslipidemia         Medical Decision Making:  Today's Assessment / Status / Plan:     It was my pleasure to meet with Mr. Perrin.    We discussed and he would prefer just to see cardiology as a can see him here in [place] I agree with Dr. currie that his dyslipidemia with LDL in the 60s is quite good I would prefer he took a low to moderate dose statin which she is in agreement as he does have mild coronary artery calcifications as well as mild plaque on his coronary angiogram.    Given the cost of the echocardiogram we can defer that for a " year after his surgery as a CT is reassuring    I will see Mr. Perrin back in 1 year time and encouraged him to follow up with us over the phone or e-mail using my MyChart as issues arise.    It is my pleasure to participate in the care of Mr. Perrin.  Please do not hesitate to contact me with questions or concerns.    Kendell Wilson MD PhD Merged with [facility]  Cardiologist [facility]

## 2017-09-27 ENCOUNTER — TELEPHONE (OUTPATIENT)
Dept: VASCULAR LAB | Facility: MEDICAL CENTER | Age: 57
End: 2017-09-27

## 2017-09-27 NOTE — TELEPHONE ENCOUNTER
"Pt is seeing . He has lost his insurance and was not able to do the echo. Dr. South \"given the cost of the echocardiogram it can be defered for a year after his surgery as a CT is reassuring. Dr. Bloch notified of situation.   AMANUEL Cai.    "

## 2018-04-12 ENCOUNTER — APPOINTMENT (OUTPATIENT)
Dept: RADIOLOGY | Facility: MEDICAL CENTER | Age: 58
End: 2018-04-12
Attending: EMERGENCY MEDICINE

## 2018-04-12 ENCOUNTER — RESOLUTE PROFESSIONAL BILLING HOSPITAL PROF FEE (OUTPATIENT)
Dept: HOSPITALIST | Facility: MEDICAL CENTER | Age: 58
End: 2018-04-12

## 2018-04-12 ENCOUNTER — HOSPITAL ENCOUNTER (OUTPATIENT)
Facility: MEDICAL CENTER | Age: 58
End: 2018-04-14
Attending: EMERGENCY MEDICINE | Admitting: INTERNAL MEDICINE

## 2018-04-12 DIAGNOSIS — T14.8XXA HEMATOMA: ICD-10-CM

## 2018-04-12 DIAGNOSIS — T14.8XXA HEMATOMA AND CONTUSION: ICD-10-CM
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
## 2018-04-12 DIAGNOSIS — R55 SYNCOPE, UNSPECIFIED SYNCOPE TYPE: ICD-10-CM

## 2018-04-12 PROBLEM — Z98.890 HISTORY OF REPAIR OF THORACIC AORTIC ANEURYSM: Status: ACTIVE | Noted: 2018-04-12

## 2018-04-12 PROBLEM — Z86.79 HISTORY OF REPAIR OF THORACIC AORTIC ANEURYSM: Status: ACTIVE | Noted: 2018-04-12

## 2018-04-12 PROBLEM — K76.0 FATTY LIVER: Status: ACTIVE | Noted: 2018-04-12

## 2018-04-12 PROBLEM — Z95.2 HISTORY OF AORTIC VALVE REPLACEMENT: Status: ACTIVE | Noted: 2018-04-12

## 2018-04-12 LAB
ABO GROUP BLD: NORMAL
ABO GROUP BLD: NORMAL
ALBUMIN SERPL BCP-MCNC: 4.3 G/DL (ref 3.2–4.9)
ALBUMIN/GLOB SERPL: 1.7 G/DL
ALP SERPL-CCNC: 70 U/L (ref 30–99)
ALT SERPL-CCNC: 30 U/L (ref 2–50)
ANION GAP SERPL CALC-SCNC: 9 MMOL/L (ref 0–11.9)
APTT PPP: 27.7 SEC (ref 24.7–36)
AST SERPL-CCNC: 27 U/L (ref 12–45)
BASOPHILS # BLD AUTO: 0.2 % (ref 0–1.8)
BASOPHILS # BLD: 0.02 K/UL (ref 0–0.12)
BILIRUB SERPL-MCNC: 0.8 MG/DL (ref 0.1–1.5)
BLD GP AB SCN SERPL QL: NORMAL
BNP SERPL-MCNC: 14 PG/ML (ref 0–100)
BUN SERPL-MCNC: 23 MG/DL (ref 8–22)
CALCIUM SERPL-MCNC: 8.7 MG/DL (ref 8.5–10.5)
CFT BLD TEG: 4.6 MIN (ref 5–10)
CHLORIDE SERPL-SCNC: 109 MMOL/L (ref 96–112)
CLOT ANGLE BLD TEG: 59.2 DEGREES (ref 53–72)
CLOT LYSIS 30M P MA LENFR BLD TEG: 0 % (ref 0–8)
CO2 SERPL-SCNC: 23 MMOL/L (ref 20–33)
CREAT SERPL-MCNC: 0.8 MG/DL (ref 0.5–1.4)
CT.EXTRINSIC BLD ROTEM: 2.4 MIN (ref 1–3)
EOSINOPHIL # BLD AUTO: 0 K/UL (ref 0–0.51)
EOSINOPHIL NFR BLD: 0 % (ref 0–6.9)
ERYTHROCYTE [DISTWIDTH] IN BLOOD BY AUTOMATED COUNT: 36.6 FL (ref 35.9–50)
ERYTHROCYTE [DISTWIDTH] IN BLOOD BY AUTOMATED COUNT: 38.2 FL (ref 35.9–50)
EST. AVERAGE GLUCOSE BLD GHB EST-MCNC: 108 MG/DL
ETHANOL BLD-MCNC: 0 G/DL
GLOBULIN SER CALC-MCNC: 2.5 G/DL (ref 1.9–3.5)
GLUCOSE SERPL-MCNC: 109 MG/DL (ref 65–99)
HBA1C MFR BLD: 5.4 % (ref 0–5.6)
HCT VFR BLD AUTO: 42.6 % (ref 42–52)
HCT VFR BLD AUTO: 46 % (ref 42–52)
HGB BLD-MCNC: 14.3 G/DL (ref 14–18)
HGB BLD-MCNC: 15.9 G/DL (ref 14–18)
IMM GRANULOCYTES # BLD AUTO: 0.07 K/UL (ref 0–0.11)
IMM GRANULOCYTES NFR BLD AUTO: 0.6 % (ref 0–0.9)
INR PPP: 1.22 (ref 0.87–1.13)
LACTATE BLD-SCNC: 1.7 MMOL/L (ref 0.5–2)
LYMPHOCYTES # BLD AUTO: 0.49 K/UL (ref 1–4.8)
LYMPHOCYTES NFR BLD: 4.5 % (ref 22–41)
MCF BLD TEG: 58.6 MM (ref 50–70)
MCH RBC QN AUTO: 28.8 PG (ref 27–33)
MCH RBC QN AUTO: 28.8 PG (ref 27–33)
MCHC RBC AUTO-ENTMCNC: 33.6 G/DL (ref 33.7–35.3)
MCHC RBC AUTO-ENTMCNC: 34.6 G/DL (ref 33.7–35.3)
MCV RBC AUTO: 83.2 FL (ref 81.4–97.8)
MCV RBC AUTO: 85.7 FL (ref 81.4–97.8)
MONOCYTES # BLD AUTO: 0.71 K/UL (ref 0–0.85)
MONOCYTES NFR BLD AUTO: 6.5 % (ref 0–13.4)
NEUTROPHILS # BLD AUTO: 9.57 K/UL (ref 1.82–7.42)
NEUTROPHILS NFR BLD: 88.2 % (ref 44–72)
NRBC # BLD AUTO: 0 K/UL
NRBC BLD-RTO: 0 /100 WBC
PA AA BLD-ACNC: 14 %
PA ADP BLD-ACNC: 17.6 %
PLATELET # BLD AUTO: 181 K/UL (ref 164–446)
PLATELET # BLD AUTO: 186 K/UL (ref 164–446)
PMV BLD AUTO: 10.2 FL (ref 9–12.9)
PMV BLD AUTO: 10.4 FL (ref 9–12.9)
POTASSIUM SERPL-SCNC: 3.6 MMOL/L (ref 3.6–5.5)
PROT SERPL-MCNC: 6.8 G/DL (ref 6–8.2)
PROTHROMBIN TIME: 15.1 SEC (ref 12–14.6)
RBC # BLD AUTO: 4.97 M/UL (ref 4.7–6.1)
RBC # BLD AUTO: 5.53 M/UL (ref 4.7–6.1)
RH BLD: NORMAL
RH BLD: NORMAL
SODIUM SERPL-SCNC: 141 MMOL/L (ref 135–145)
TEG ALGORITHM TGALG: ABNORMAL
TROPONIN I SERPL-MCNC: <0.01 NG/ML (ref 0–0.04)
TSH SERPL DL<=0.005 MIU/L-ACNC: 1.01 UIU/ML (ref 0.38–5.33)
WBC # BLD AUTO: 10.9 K/UL (ref 4.8–10.8)
WBC # BLD AUTO: 8.5 K/UL (ref 4.8–10.8)

## 2018-04-12 PROCEDURE — 72131 CT LUMBAR SPINE W/O DYE: CPT

## 2018-04-12 PROCEDURE — 85025 COMPLETE CBC W/AUTO DIFF WBC: CPT

## 2018-04-12 PROCEDURE — 85610 PROTHROMBIN TIME: CPT

## 2018-04-12 PROCEDURE — 80053 COMPREHEN METABOLIC PANEL: CPT

## 2018-04-12 PROCEDURE — 85576 BLOOD PLATELET AGGREGATION: CPT | Mod: 91

## 2018-04-12 PROCEDURE — G0378 HOSPITAL OBSERVATION PER HR: HCPCS

## 2018-04-12 PROCEDURE — 700111 HCHG RX REV CODE 636 W/ 250 OVERRIDE (IP): Performed by: EMERGENCY MEDICINE

## 2018-04-12 PROCEDURE — 72128 CT CHEST SPINE W/O DYE: CPT

## 2018-04-12 PROCEDURE — 99220 PR INITIAL OBSERVATION CARE,LEVL III: CPT | Performed by: INTERNAL MEDICINE

## 2018-04-12 PROCEDURE — 84484 ASSAY OF TROPONIN QUANT: CPT

## 2018-04-12 PROCEDURE — 71045 X-RAY EXAM CHEST 1 VIEW: CPT

## 2018-04-12 PROCEDURE — 700105 HCHG RX REV CODE 258: Performed by: INTERNAL MEDICINE

## 2018-04-12 PROCEDURE — 85730 THROMBOPLASTIN TIME PARTIAL: CPT

## 2018-04-12 PROCEDURE — 86901 BLOOD TYPING SEROLOGIC RH(D): CPT

## 2018-04-12 PROCEDURE — 96374 THER/PROPH/DIAG INJ IV PUSH: CPT

## 2018-04-12 PROCEDURE — 80307 DRUG TEST PRSMV CHEM ANLYZR: CPT

## 2018-04-12 PROCEDURE — 83605 ASSAY OF LACTIC ACID: CPT

## 2018-04-12 PROCEDURE — 96375 TX/PRO/DX INJ NEW DRUG ADDON: CPT

## 2018-04-12 PROCEDURE — 86850 RBC ANTIBODY SCREEN: CPT

## 2018-04-12 PROCEDURE — 72125 CT NECK SPINE W/O DYE: CPT

## 2018-04-12 PROCEDURE — 90715 TDAP VACCINE 7 YRS/> IM: CPT | Performed by: EMERGENCY MEDICINE

## 2018-04-12 PROCEDURE — 85027 COMPLETE CBC AUTOMATED: CPT

## 2018-04-12 PROCEDURE — 85384 FIBRINOGEN ACTIVITY: CPT

## 2018-04-12 PROCEDURE — 83880 ASSAY OF NATRIURETIC PEPTIDE: CPT

## 2018-04-12 PROCEDURE — 90471 IMMUNIZATION ADMIN: CPT

## 2018-04-12 PROCEDURE — 99285 EMERGENCY DEPT VISIT HI MDM: CPT

## 2018-04-12 PROCEDURE — 86900 BLOOD TYPING SEROLOGIC ABO: CPT

## 2018-04-12 PROCEDURE — 36415 COLL VENOUS BLD VENIPUNCTURE: CPT

## 2018-04-12 PROCEDURE — 84443 ASSAY THYROID STIM HORMONE: CPT

## 2018-04-12 PROCEDURE — 93005 ELECTROCARDIOGRAM TRACING: CPT | Performed by: EMERGENCY MEDICINE

## 2018-04-12 PROCEDURE — 83036 HEMOGLOBIN GLYCOSYLATED A1C: CPT

## 2018-04-12 PROCEDURE — 80048 BASIC METABOLIC PNL TOTAL CA: CPT

## 2018-04-12 PROCEDURE — 700117 HCHG RX CONTRAST REV CODE 255: Performed by: EMERGENCY MEDICINE

## 2018-04-12 PROCEDURE — 71260 CT THORAX DX C+: CPT

## 2018-04-12 PROCEDURE — 70450 CT HEAD/BRAIN W/O DYE: CPT

## 2018-04-12 PROCEDURE — 80061 LIPID PANEL: CPT

## 2018-04-12 PROCEDURE — 85347 COAGULATION TIME ACTIVATED: CPT

## 2018-04-12 RX ORDER — BISACODYL 10 MG
10 SUPPOSITORY, RECTAL RECTAL
Status: DISCONTINUED | OUTPATIENT
Start: 2018-04-12 | End: 2018-04-14 | Stop reason: HOSPADM

## 2018-04-12 RX ORDER — MORPHINE SULFATE 4 MG/ML
4 INJECTION, SOLUTION INTRAMUSCULAR; INTRAVENOUS ONCE
Status: COMPLETED | OUTPATIENT
Start: 2018-04-12 | End: 2018-04-12

## 2018-04-12 RX ORDER — AMOXICILLIN 250 MG
2 CAPSULE ORAL 2 TIMES DAILY
Status: DISCONTINUED | OUTPATIENT
Start: 2018-04-12 | End: 2018-04-14 | Stop reason: HOSPADM

## 2018-04-12 RX ORDER — ASPIRIN 81 MG/1
81 TABLET, CHEWABLE ORAL DAILY
COMMUNITY
End: 2018-06-27

## 2018-04-12 RX ORDER — SODIUM CHLORIDE 9 MG/ML
INJECTION, SOLUTION INTRAVENOUS CONTINUOUS
Status: DISCONTINUED | OUTPATIENT
Start: 2018-04-12 | End: 2018-04-13

## 2018-04-12 RX ORDER — POLYETHYLENE GLYCOL 3350 17 G/17G
1 POWDER, FOR SOLUTION ORAL
Status: DISCONTINUED | OUTPATIENT
Start: 2018-04-12 | End: 2018-04-14 | Stop reason: HOSPADM

## 2018-04-12 RX ORDER — ONDANSETRON 2 MG/ML
4 INJECTION INTRAMUSCULAR; INTRAVENOUS ONCE
Status: COMPLETED | OUTPATIENT
Start: 2018-04-12 | End: 2018-04-12

## 2018-04-12 RX ADMIN — SODIUM CHLORIDE: 9 INJECTION, SOLUTION INTRAVENOUS at 18:27

## 2018-04-12 RX ADMIN — ONDANSETRON 4 MG: 2 INJECTION, SOLUTION INTRAMUSCULAR; INTRAVENOUS at 16:30

## 2018-04-12 RX ADMIN — SODIUM CHLORIDE: 9 INJECTION, SOLUTION INTRAVENOUS at 23:09

## 2018-04-12 RX ADMIN — MORPHINE SULFATE 4 MG: 4 INJECTION INTRAVENOUS at 16:30

## 2018-04-12 RX ADMIN — CLOSTRIDIUM TETANI TOXOID ANTIGEN (FORMALDEHYDE INACTIVATED), CORYNEBACTERIUM DIPHTHERIAE TOXOID ANTIGEN (FORMALDEHYDE INACTIVATED), BORDETELLA PERTUSSIS TOXOID ANTIGEN (GLUTARALDEHYDE INACTIVATED), BORDETELLA PERTUSSIS FILAMENTOUS HEMAGGLUTININ ANTIGEN (FORMALDEHYDE INACTIVATED), BORDETELLA PERTUSSIS PERTACTIN ANTIGEN, AND BORDETELLA PERTUSSIS FIMBRIAE 2/3 ANTIGEN 0.5 ML: 5; 2; 2.5; 5; 3; 5 INJECTION, SUSPENSION INTRAMUSCULAR at 14:58

## 2018-04-12 RX ADMIN — IOHEXOL 100 ML: 350 INJECTION, SOLUTION INTRAVENOUS at 15:32

## 2018-04-12 ASSESSMENT — ENCOUNTER SYMPTOMS
BRUISES/BLEEDS EASILY: 0
NAUSEA: 0
FEVER: 0
DEPRESSION: 0
HEMOPTYSIS: 0
VOMITING: 0
COUGH: 0
DIZZINESS: 0
ABDOMINAL PAIN: 0
BACK PAIN: 1
SHORTNESS OF BREATH: 0
CHILLS: 0
FOCAL WEAKNESS: 0
PALPITATIONS: 0
NECK PAIN: 1
MYALGIAS: 1
BLURRED VISION: 0
LOSS OF CONSCIOUSNESS: 1
WEAKNESS: 1

## 2018-04-12 ASSESSMENT — PAIN SCALES - GENERAL: PAINLEVEL_OUTOF10: 5

## 2018-04-12 ASSESSMENT — PATIENT HEALTH QUESTIONNAIRE - PHQ9
1. LITTLE INTEREST OR PLEASURE IN DOING THINGS: NOT AT ALL
SUM OF ALL RESPONSES TO PHQ9 QUESTIONS 1 AND 2: 0
2. FEELING DOWN, DEPRESSED, IRRITABLE, OR HOPELESS: NOT AT ALL

## 2018-04-12 ASSESSMENT — LIFESTYLE VARIABLES
EVER_SMOKED: YES
ALCOHOL_USE: NO

## 2018-04-12 NOTE — ED NOTES
Assumed care of pt. Pt trying to sit EOB, appears unsteady, c/o pain when laying in bed. Pt assisted into a gown and given a urinal. Pt medicated for pain. Assisted back to bed. Cardiology @ [facility].
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
## 2018-04-13 ENCOUNTER — PATIENT OUTREACH (OUTPATIENT)
Dept: HEALTH INFORMATION MANAGEMENT | Facility: OTHER | Age: 58
End: 2018-04-13

## 2018-04-13 LAB
ANION GAP SERPL CALC-SCNC: 5 MMOL/L (ref 0–11.9)
BUN SERPL-MCNC: 24 MG/DL (ref 8–22)
CALCIUM SERPL-MCNC: 8.9 MG/DL (ref 8.5–10.5)
CHLORIDE SERPL-SCNC: 109 MMOL/L (ref 96–112)
CHOLEST SERPL-MCNC: 94 MG/DL (ref 100–199)
CO2 SERPL-SCNC: 25 MMOL/L (ref 20–33)
CREAT SERPL-MCNC: 0.81 MG/DL (ref 0.5–1.4)
GLUCOSE SERPL-MCNC: 168 MG/DL (ref 65–99)
HDLC SERPL-MCNC: 26 MG/DL
LDLC SERPL CALC-MCNC: 60 MG/DL
LV EJECT FRACT  99904: 60
LV EJECT FRACT MOD 2C 99903: 45.91
LV EJECT FRACT MOD 4C 99902: 63.51
LV EJECT FRACT MOD BP 99901: 59.63
POTASSIUM SERPL-SCNC: 4.8 MMOL/L (ref 3.6–5.5)
SODIUM SERPL-SCNC: 139 MMOL/L (ref 135–145)
TRIGL SERPL-MCNC: 41 MG/DL (ref 0–149)
TROPONIN I SERPL-MCNC: <0.01 NG/ML (ref 0–0.04)

## 2018-04-13 PROCEDURE — 93306 TTE W/DOPPLER COMPLETE: CPT

## 2018-04-13 PROCEDURE — G0378 HOSPITAL OBSERVATION PER HR: HCPCS

## 2018-04-13 PROCEDURE — 700102 HCHG RX REV CODE 250 W/ 637 OVERRIDE(OP): Performed by: STUDENT IN AN ORGANIZED HEALTH CARE EDUCATION/TRAINING PROGRAM

## 2018-04-13 PROCEDURE — 99225 PR SUBSEQUENT OBSERVATION CARE,LEVEL II: CPT | Performed by: INTERNAL MEDICINE

## 2018-04-13 PROCEDURE — 90686 IIV4 VACC NO PRSV 0.5 ML IM: CPT | Performed by: INTERNAL MEDICINE

## 2018-04-13 PROCEDURE — 700105 HCHG RX REV CODE 258: Performed by: INTERNAL MEDICINE

## 2018-04-13 PROCEDURE — 97165 OT EVAL LOW COMPLEX 30 MIN: CPT

## 2018-04-13 PROCEDURE — 93306 TTE W/DOPPLER COMPLETE: CPT | Mod: 26 | Performed by: INTERNAL MEDICINE

## 2018-04-13 PROCEDURE — G8987 SELF CARE CURRENT STATUS: HCPCS | Mod: CK

## 2018-04-13 PROCEDURE — 90471 IMMUNIZATION ADMIN: CPT

## 2018-04-13 PROCEDURE — 93880 EXTRACRANIAL BILAT STUDY: CPT

## 2018-04-13 PROCEDURE — A9270 NON-COVERED ITEM OR SERVICE: HCPCS | Performed by: STUDENT IN AN ORGANIZED HEALTH CARE EDUCATION/TRAINING PROGRAM

## 2018-04-13 PROCEDURE — G8988 SELF CARE GOAL STATUS: HCPCS | Mod: CI

## 2018-04-13 PROCEDURE — 700111 HCHG RX REV CODE 636 W/ 250 OVERRIDE (IP): Performed by: INTERNAL MEDICINE

## 2018-04-13 RX ORDER — ACETAMINOPHEN 325 MG/1
650 TABLET ORAL EVERY 6 HOURS PRN
Status: DISCONTINUED | OUTPATIENT
Start: 2018-04-13 | End: 2018-04-14 | Stop reason: HOSPADM

## 2018-04-13 RX ORDER — HYDROCODONE BITARTRATE AND ACETAMINOPHEN 5; 325 MG/1; MG/1
1 TABLET ORAL EVERY 4 HOURS PRN
Status: DISCONTINUED | OUTPATIENT
Start: 2018-04-13 | End: 2018-04-14 | Stop reason: HOSPADM

## 2018-04-13 RX ADMIN — HYDROCODONE BITARTRATE AND ACETAMINOPHEN 1 TABLET: 5; 325 TABLET ORAL at 18:05

## 2018-04-13 RX ADMIN — HYDROCODONE BITARTRATE AND ACETAMINOPHEN 1 TABLET: 5; 325 TABLET ORAL at 11:54

## 2018-04-13 RX ADMIN — SODIUM CHLORIDE: 9 INJECTION, SOLUTION INTRAVENOUS at 11:57

## 2018-04-13 RX ADMIN — INFLUENZA A VIRUS A/MICHIGAN/45/2015 X-275 (H1N1) ANTIGEN (FORMALDEHYDE INACTIVATED), INFLUENZA A VIRUS A/HONG KONG/4801/2014 X-263B (H3N2) ANTIGEN (FORMALDEHYDE INACTIVATED), INFLUENZA B VIRUS B/PHUKET/3073/2013 ANTIGEN (FORMALDEHYDE INACTIVATED), AND INFLUENZA B VIRUS B/BRISBANE/60/2008 ANTIGEN (FORMALDEHYDE INACTIVATED) 0.5 ML: 15; 15; 15; 15 INJECTION, SUSPENSION INTRAMUSCULAR at 05:53

## 2018-04-13 RX ADMIN — HYDROCODONE BITARTRATE AND ACETAMINOPHEN 1 TABLET: 5; 325 TABLET ORAL at 06:11

## 2018-04-13 ASSESSMENT — COGNITIVE AND FUNCTIONAL STATUS - GENERAL
SUGGESTED CMS G CODE MODIFIER DAILY ACTIVITY: CK
DAILY ACTIVITIY SCORE: 17
HELP NEEDED FOR BATHING: A LOT
DRESSING REGULAR UPPER BODY CLOTHING: A LITTLE
TOILETING: A LITTLE
PERSONAL GROOMING: A LITTLE
DRESSING REGULAR LOWER BODY CLOTHING: A LOT

## 2018-04-13 ASSESSMENT — PAIN SCALES - GENERAL
PAINLEVEL_OUTOF10: 4
PAINLEVEL_OUTOF10: 7
PAINLEVEL_OUTOF10: 5

## 2018-04-13 ASSESSMENT — ENCOUNTER SYMPTOMS
ABDOMINAL PAIN: 0
PND: 0
PALPITATIONS: 0
ORTHOPNEA: 0
ROS SKIN COMMENTS: ABRASIONS
SHORTNESS OF BREATH: 0
BACK PAIN: 0
BLURRED VISION: 0
FEVER: 0
WEAKNESS: 0
HEADACHES: 0
WEIGHT LOSS: 0
LOSS OF CONSCIOUSNESS: 0
CLAUDICATION: 0
DIZZINESS: 0
SENSORY CHANGE: 0

## 2018-04-13 ASSESSMENT — ACTIVITIES OF DAILY LIVING (ADL): TOILETING: INDEPENDENT

## 2018-04-13 NOTE — PROGRESS NOTES
Patient transferred to the floor, placed on tele box, and monitor room notified. Patient has generalized soreness throughout his body, declines any intervention, and no indications of distress. Bed alarm is on, bed in the lowest position, and call light and personal belongings within reach.

## 2018-04-13 NOTE — DISCHARGE PLANNING
Care Transition Team Assessment    Information Source  Orientation : Oriented x 4  Information Given By: Patient  Who is responsible for making decisions for patient? : Patient         Elopement Risk  Legal Hold: No  Ambulatory or Self Mobile in Wheelchair: No-Not an Elopement Risk    Interdisciplinary Discharge Planning  Lives with - Patient's Self Care Capacity: Alone and Able to Care For Self  Support Systems: Family Member(s), Parent  Able to Return to Previous ADL's: Yes  Mobility Issues: No  Prior Services: None  Patient Expects to be Discharged to:: home  Assistance Needed: No  Durable Medical Equipment: Not Applicable              Finances  Financial Barriers to Discharge: No  Prescription Coverage: Yes                   Domestic Abuse  Have you ever been the victim of abuse or violence?: No         Discharge Risks or Barriers  Discharge risks or barriers?: No    Anticipated Discharge Information  Discharge Contact Phone Number: pt reports his dad will take him home on dc

## 2018-04-13 NOTE — CARE PLAN
Problem: Communication  Goal: The ability to communicate needs accurately and effectively will improve  Outcome: PROGRESSING AS EXPECTED  Patient addresses any questions or concerns that he has with his treatment, care, and medications.    Problem: Venous Thromboembolism (VTW)/Deep Vein Thrombosis (DVT) Prevention:  Goal: Patient will participate in Venous Thrombosis (VTE)/Deep Vein Thrombosis (DVT)Prevention Measures  Outcome: PROGRESSING AS EXPECTED  Patient has SCDs in place
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
## 2018-04-13 NOTE — PROGRESS NOTES
2 RN skin check: Patient's generalized skin has bruising throughout his body (trunk, Lt hip, chest, upper extremities, and face), Mcpherson on the back of both knees, red rash spot by Lt groin, sacrum is red and excoriated, and bilateral ears are red but blanching.
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
## 2018-04-13 NOTE — PROGRESS NOTES
Bedside report received. Discussed pt hx, current status and POC. Pt awake, alert, sitting on side of bed. Unlabored breathing with O2 via NC in place. Bed in low/locked position. Call bell within reach. Will continue to monitor.
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
## 2018-04-14 VITALS
WEIGHT: 234.79 LBS | HEIGHT: 70 IN | TEMPERATURE: 98 F | DIASTOLIC BLOOD PRESSURE: 62 MMHG | HEART RATE: 69 BPM | RESPIRATION RATE: 18 BRPM | BODY MASS INDEX: 33.61 KG/M2 | SYSTOLIC BLOOD PRESSURE: 120 MMHG | OXYGEN SATURATION: 94 %

## 2018-04-14 LAB
ANION GAP SERPL CALC-SCNC: 5 MMOL/L (ref 0–11.9)
BASOPHILS # BLD AUTO: 0.4 % (ref 0–1.8)
BASOPHILS # BLD: 0.03 K/UL (ref 0–0.12)
BUN SERPL-MCNC: 21 MG/DL (ref 8–22)
CALCIUM SERPL-MCNC: 8.4 MG/DL (ref 8.5–10.5)
CHLORIDE SERPL-SCNC: 105 MMOL/L (ref 96–112)
CO2 SERPL-SCNC: 28 MMOL/L (ref 20–33)
CREAT SERPL-MCNC: 0.79 MG/DL (ref 0.5–1.4)
EKG IMPRESSION: NORMAL
EOSINOPHIL # BLD AUTO: 0.1 K/UL (ref 0–0.51)
EOSINOPHIL NFR BLD: 1.4 % (ref 0–6.9)
ERYTHROCYTE [DISTWIDTH] IN BLOOD BY AUTOMATED COUNT: 38.1 FL (ref 35.9–50)
GLUCOSE SERPL-MCNC: 120 MG/DL (ref 65–99)
HCT VFR BLD AUTO: 37.5 % (ref 42–52)
HGB BLD-MCNC: 12.6 G/DL (ref 14–18)
IMM GRANULOCYTES # BLD AUTO: 0.03 K/UL (ref 0–0.11)
IMM GRANULOCYTES NFR BLD AUTO: 0.4 % (ref 0–0.9)
LYMPHOCYTES # BLD AUTO: 1.07 K/UL (ref 1–4.8)
LYMPHOCYTES NFR BLD: 14.6 % (ref 22–41)
MCH RBC QN AUTO: 28.8 PG (ref 27–33)
MCHC RBC AUTO-ENTMCNC: 33.6 G/DL (ref 33.7–35.3)
MCV RBC AUTO: 85.8 FL (ref 81.4–97.8)
MONOCYTES # BLD AUTO: 0.66 K/UL (ref 0–0.85)
MONOCYTES NFR BLD AUTO: 9 % (ref 0–13.4)
NEUTROPHILS # BLD AUTO: 5.46 K/UL (ref 1.82–7.42)
NEUTROPHILS NFR BLD: 74.2 % (ref 44–72)
NRBC # BLD AUTO: 0 K/UL
NRBC BLD-RTO: 0 /100 WBC
PLATELET # BLD AUTO: 162 K/UL (ref 164–446)
PMV BLD AUTO: 10.3 FL (ref 9–12.9)
POTASSIUM SERPL-SCNC: 4.8 MMOL/L (ref 3.6–5.5)
RBC # BLD AUTO: 4.37 M/UL (ref 4.7–6.1)
SODIUM SERPL-SCNC: 138 MMOL/L (ref 135–145)
WBC # BLD AUTO: 7.4 K/UL (ref 4.8–10.8)

## 2018-04-14 PROCEDURE — 700101 HCHG RX REV CODE 250: Performed by: INTERNAL MEDICINE

## 2018-04-14 PROCEDURE — 85025 COMPLETE CBC W/AUTO DIFF WBC: CPT

## 2018-04-14 PROCEDURE — 700102 HCHG RX REV CODE 250 W/ 637 OVERRIDE(OP): Performed by: INTERNAL MEDICINE

## 2018-04-14 PROCEDURE — A9270 NON-COVERED ITEM OR SERVICE: HCPCS | Performed by: INTERNAL MEDICINE

## 2018-04-14 PROCEDURE — 700101 HCHG RX REV CODE 250

## 2018-04-14 PROCEDURE — 700102 HCHG RX REV CODE 250 W/ 637 OVERRIDE(OP): Performed by: STUDENT IN AN ORGANIZED HEALTH CARE EDUCATION/TRAINING PROGRAM

## 2018-04-14 PROCEDURE — 99217 PR OBSERVATION CARE DISCHARGE: CPT | Performed by: INTERNAL MEDICINE

## 2018-04-14 PROCEDURE — C1764 EVENT RECORDER, CARDIAC: HCPCS

## 2018-04-14 PROCEDURE — 36415 COLL VENOUS BLD VENIPUNCTURE: CPT

## 2018-04-14 PROCEDURE — A9270 NON-COVERED ITEM OR SERVICE: HCPCS | Performed by: STUDENT IN AN ORGANIZED HEALTH CARE EDUCATION/TRAINING PROGRAM

## 2018-04-14 PROCEDURE — 33282 HCHG CARDIAC LR INSERTION: CPT

## 2018-04-14 PROCEDURE — G0378 HOSPITAL OBSERVATION PER HR: HCPCS

## 2018-04-14 PROCEDURE — 80048 BASIC METABOLIC PNL TOTAL CA: CPT

## 2018-04-14 RX ORDER — LIDOCAINE HYDROCHLORIDE AND EPINEPHRINE 10; 10 MG/ML; UG/ML
10 INJECTION, SOLUTION INFILTRATION; PERINEURAL ONCE
Status: COMPLETED | OUTPATIENT
Start: 2018-04-14 | End: 2018-04-14

## 2018-04-14 RX ORDER — HYDROCODONE BITARTRATE AND ACETAMINOPHEN 5; 325 MG/1; MG/1
1 TABLET ORAL EVERY 6 HOURS PRN
Qty: 10 TAB | Refills: 0 | Status: SHIPPED | OUTPATIENT
Start: 2018-04-14 | End: 2018-04-17

## 2018-04-14 RX ORDER — LIDOCAINE HYDROCHLORIDE AND EPINEPHRINE BITARTRATE 20; .01 MG/ML; MG/ML
INJECTION, SOLUTION SUBCUTANEOUS
Status: COMPLETED
Start: 2018-04-14 | End: 2018-04-14

## 2018-04-14 RX ADMIN — LIDOCAINE HYDROCHLORIDE,EPINEPHRINE BITARTRATE 10 ML: 10; .01 INJECTION, SOLUTION INFILTRATION; PERINEURAL at 13:50

## 2018-04-14 RX ADMIN — ACETAMINOPHEN 650 MG: 325 TABLET, FILM COATED ORAL at 11:33

## 2018-04-14 RX ADMIN — HYDROCODONE BITARTRATE AND ACETAMINOPHEN 1 TABLET: 5; 325 TABLET ORAL at 02:16

## 2018-04-14 RX ADMIN — STANDARDIZED SENNA CONCENTRATE AND DOCUSATE SODIUM 2 TABLET: 8.6; 5 TABLET, FILM COATED ORAL at 10:18

## 2018-04-14 ASSESSMENT — PAIN SCALES - GENERAL
PAINLEVEL_OUTOF10: 0
PAINLEVEL_OUTOF10: 5
PAINLEVEL_OUTOF10: 2
PAINLEVEL_OUTOF10: 4
PAINLEVEL_OUTOF10: 4

## 2018-04-14 NOTE — WOUND TEAM
Wound Team consulted for rash present to bilateral posterior knees, sacrococcygeal, and left upper inner thigh. They appear to be fungal, perhaps ringworm. They need to be assessed by the attending physician so an appropriate cream can be ordered. Discussed this with nursing.

## 2018-04-14 NOTE — PROGRESS NOTES
Patient is sitting up in bed, has complaints of generalized soreness, and no indications of distress. Will continue to monitor.
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
## 2018-04-14 NOTE — DISCHARGE INSTRUCTIONS
"Discharge Instructions    Discharged to home by car with relative. Discharged via wheelchair, hospital escort: Yes.  Special equipment needed: Not Applicable    Be sure to schedule a follow-up appointment with your primary care doctor or any specialists as instructed.     Discharge Plan:   Influenza Vaccine Indication: Indicated: 9 to 64 years of age  Influenza Vaccine Given - only chart on this line when given: Influenza Vaccine Given (See MAR)    I understand that a diet low in cholesterol, fat, and sodium is recommended for good health. Unless I have been given specific instructions below for another diet, I accept this instruction as my diet prescription.   Other diet: Cardiac    Special Instructions: None    · Is patient discharged on Warfarin / Coumadin?   No       Patient to f/u with PCP in 1 week and cardiology clinic. He should not be driving until he follows up with cardiology.      Implantable Loop Recorder Placement  Introduction  An implantable loop recorder is a small electronic device that is placed under the skin of your chest. It is about the size of an AA (\"double A\") battery. The device records the electrical activity of your heart over a long period of time. Your health care provider can download these recordings to monitor your heart.  You may need an implantable loop recorder if you have periods of abnormal heart activity (arrhythmias) or unexplained fainting (syncope) caused by a heart problem.  Tell a health care provider about:  Any allergies you have.  All medicines you are taking, including vitamins, herbs, eye drops, creams, and over-the-counter medicines.  Any problems you or family members have had with anesthetic medicines.  Any blood disorders you have.  Any surgeries you have had.  Any medical conditions you have.  Whether you are pregnant or may be pregnant.  What are the risks?  Generally, this is a safe procedure. However, as with any procedure, problems may occur, " including:  Infection.  Bleeding.  Allergic reactions to anesthetic medicines.  Damage to nerves or blood vessels.  Failure of the device to work. This could require another surgery to replace it.  What happens before the procedure?    You may have a physical exam, blood tests, and imaging tests of your heart, such as a chest X-ray.  Follow instructions from your health care provider about eating or drinking restrictions.  Ask your health care provider about:  Changing or stopping your regular medicines. This is especially important if you are taking diabetes medicines or blood thinners.  Taking medicines such as aspirin and ibuprofen. These medicines can thin your blood. Do not take these medicines before your procedure if your surgeon instructs you not to.  Ask your health care provider how your surgical site will be marked or identified.  You may be given antibiotic medicine to help prevent infection.  Plan to have someone take you home after the procedure.  If you will be going home right after the procedure, plan to have someone with you for 24 hours.  Do not use any tobacco products, such as cigarettes, chewing tobacco, and e-cigarettes as told by your surgeon. If you need help quitting, ask your health care provider.  What happens during the procedure?  To reduce your risk of infection:  Your health care team will wash or sanitize their hands.  Your skin will be washed with soap.  An IV tube will be inserted into one of your veins.  You may be given an antibiotic medicine through the IV tube.  You may be given one or more of the following:  A medicine to help you relax (sedative).  A medicine to numb the area (local anesthetic).  A small cut (incision) will be made on the left side of your upper chest.  A pocket will be created under your skin.  The device will be placed in the pocket.  The incision will be closed with stitches (sutures) or adhesive strips.  A bandage (dressing) will be placed over the  incision.  The procedure may vary among health care providers and hospitals.  What happens after the procedure?  Your blood pressure, heart rate, breathing rate, and blood oxygen level will be monitored often until the medicines you were given have worn off.  You may be able to go home on the day of your surgery. Before going home:  Your health care provider will program your recorder.  You will learn how to trigger your device with a handheld activator.  You will learn how to send recordings to your health care provider.  You will get an ID card for your device, and you will be told when to use it.  Do not drive for 24 hours if you received a sedative.  This information is not intended to replace advice given to you by your health care provider. Make sure you discuss any questions you have with your health care provider.  Document Released: 11/28/2016 Document Revised: 05/25/2017 Document Reviewed: 09/21/2016  © 2017 [employer]      Depression / Suicide Risk    As you are discharged from this [facility] facility, it is important to learn how to keep safe from harming yourself.    Recognize the warning signs:  · Abrupt changes in personality, positive or negative- including increase in energy   · Giving away possessions  · Change in eating patterns- significant weight changes-  positive or negative  · Change in sleeping patterns- unable to sleep or sleeping all the time   · Unwillingness or inability to communicate  · Depression  · Unusual sadness, discouragement and loneliness  · Talk of wanting to die  · Neglect of personal appearance   · Rebelliousness- reckless behavior  · Withdrawal from people/activities they love  · Confusion- inability to concentrate     If you or a loved one observes any of these behaviors or has concerns about self-harm, here's what you can do:  · Talk about it- your feelings and reasons for harming yourself  · Remove any means that you might use to hurt yourself (examples: pills, rope,  extension cords, firearm)  · Get professional help from the community (Mental Health, Substance Abuse, psychological counseling)  · Do not be alone:Call your Safe Contact- someone whom you trust who will be there for you.  · Call your local CRISIS HOTLINE 250-6817 or 247-096-5859  · Call your local Children's Mobile Crisis Response Team [place] (122) 061-6472 or www.Ahonya  · Call the toll free National Suicide Prevention Hotlines   · National Suicide Prevention Lifeline 055-311-YHMA (5427)  · National Hope Line Network 800-SUICIDE (214-7765)

## 2018-04-14 NOTE — CARE PLAN
Problem: Safety  Goal: Will remain free from falls  Outcome: PROGRESSING AS EXPECTED  Patient calls appropriately, patient does not require assistive devices to ambulate, wears hospital socks, has bed alarm on, is steady on his feet, has bed in the lowest position, and call light and personal belongings within reach.    Problem: Pain Management  Goal: Pain level will decrease to patient's comfort goal  Outcome: PROGRESSING AS EXPECTED  Patient's pain is tolerable with the pain medication regimen.

## 2018-04-14 NOTE — PROGRESS NOTES
Patient given all discharge instructions and follow up appt information.  PIV and tele removed.  Patient verbalized understanding of instructions and when necessary to call Md.  Verbalized understanding not to drive until follow up with cardiologist.  Patient escorted to exit via w/c accompanied by Sobia GRIFFITH.
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
## 2018-04-14 NOTE — PROGRESS NOTES
Assumed care of patient after bedside report.  Patient alert and sitting at bedside.  Verbalized understanding to call before getting OOB.
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
## 2018-04-14 NOTE — OP REPORT
[facility] ELECTROPHYSIOLOGY PROCEDURE NOTE    PROCEDURE PERFORMED: Implantable Loop Recorder    DATE OF SERVICE: 4/14/2018    : Tony Spencer MD    ASSISTANT: None    ANESTHESIA: Local    EBL: <5 cc    SPECIMENS: None    INDICATION(S):  Unexplained syncope    DESCRIPTION OF PROCEDURE:  After informed written consent, the patient was brought to the cath lab pre/post procedure area. The patient was prepped and draped in the usual sterile fashion. The procedure was performed with local anesthetic. Using the supplied incision tool, a <1 cm incision was made in the skin about 2 cm leftward and lateral to the sternal border. Using the supplied insertion tool, a tunnel was made in the subcutaneous tissue at a 45 degree angle to the sternum and the device was inserted with the supplied plunger. Manual compression was used until hemostasis achieved. Sterile dressing was applied.    IMPLANTED DEVICE INFORMATION:  Model: Medtronic LINQ   Serial number: [id number]     IMPRESSIONS:  1. Successful implantable loop recorder implantation    RECOMMENDATIONS:  1. Routine follow-up and device interrogation

## 2018-04-14 NOTE — PROGRESS NOTES
Received report on patient. Patient is sitting up at the edge of the bed, has minor complaints of generalized soreness, and no indications of distress. Bed alarm is on, bed in the lowest position, and call light and personal belongings within reach.

## 2018-04-15 ENCOUNTER — PATIENT OUTREACH (OUTPATIENT)
Dept: HEALTH INFORMATION MANAGEMENT | Facility: OTHER | Age: 58
End: 2018-04-15

## 2018-04-20 ENCOUNTER — TELEPHONE (OUTPATIENT)
Dept: CARDIOLOGY | Facility: MEDICAL CENTER | Age: 58
End: 2018-04-20

## 2018-04-20 NOTE — TELEPHONE ENCOUNTER
----- Message from Julianna Valadez sent at 4/20/2018  1:08 PM PDT -----  Regarding: FW: Patient Wound Check   Contact: [phone]  Jose King,  Will you call pt and triage pls? He has a loop recorder.  linda moreno  ----- Message -----  From: Tamara Guzman  Sent: 4/20/2018  10:07 AM  To: Julianna Valadez, Micaela Cummins, Med Ass't  Subject: Patient Wound Check                              Good Morning,     Patient called today and said his wound is bleeding and would like to know if he needs to come in for a wound / pacer check sooner than 04/24. Patient says it is not actively bleeding at this time; but he wanted to know if there was anything he should do before he comes in or if he should come in sooner.     Thank you,     Tamara

## 2018-04-20 NOTE — TELEPHONE ENCOUNTER
Spoke to pt. States site is no longer bleeding and blood did not soak through bandages. Denies redness, swelling, fever/chills. Pt states he thinks he just rolled over wrong in his sleep. Appt sched 4/24. Educated re: s/s of infection and to call back ASAP with any further bleeding episodes.

## 2018-04-24 ENCOUNTER — NON-PROVIDER VISIT (OUTPATIENT)
Dept: CARDIOLOGY | Facility: MEDICAL CENTER | Age: 58
End: 2018-04-24

## 2018-04-24 ENCOUNTER — TELEPHONE (OUTPATIENT)
Dept: CARDIOLOGY | Facility: MEDICAL CENTER | Age: 58
End: 2018-04-24

## 2018-04-24 VITALS
BODY MASS INDEX: 30.92 KG/M2 | HEART RATE: 80 BPM | HEIGHT: 70 IN | WEIGHT: 216 LBS | DIASTOLIC BLOOD PRESSURE: 72 MMHG | OXYGEN SATURATION: 92 % | SYSTOLIC BLOOD PRESSURE: 134 MMHG

## 2018-04-24 DIAGNOSIS — Z95.818 STATUS POST PLACEMENT OF IMPLANTABLE LOOP RECORDER: ICD-10-CM

## 2018-04-24 DIAGNOSIS — R55 SYNCOPE, UNSPECIFIED SYNCOPE TYPE: ICD-10-CM

## 2018-04-24 PROCEDURE — 93291 INTERROG DEV EVAL SCRMS IP: CPT | Performed by: NURSE PRACTITIONER
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
## 2018-04-26 ENCOUNTER — TELEPHONE (OUTPATIENT)
Dept: CARDIOLOGY | Facility: MEDICAL CENTER | Age: 58
End: 2018-04-26

## 2018-04-26 DIAGNOSIS — R06.83 SNORING: ICD-10-CM

## 2018-04-26 DIAGNOSIS — I35.0 SEVERE AORTIC STENOSIS: ICD-10-CM

## 2018-04-26 NOTE — TELEPHONE ENCOUNTER
I spoke with [employer] and informed him.    He would really like to get an OPO in preparation for a Sleep Study (which he really thinks he needs).  To Lety Cook for permission to order.

## 2018-04-26 NOTE — TELEPHONE ENCOUNTER
----- Message from ZOEY Spivey sent at 4/25/2018  9:27 PM PDT -----    Regarding: Loop recorder readings    Please call pt and let him know that Dr. Spencer looked at the rhythm strips from his loop recorder, and he felt that there were no pauses (due to artifact).  NO change in his meds, and suggest FU with me in 2-3 months for next loop recorder reading. IF he has any episodes, he can always send a manual transmission too.  Thanks, AB

## 2018-05-02 ENCOUNTER — TELEPHONE (OUTPATIENT)
Dept: CARDIOLOGY | Facility: MEDICAL CENTER | Age: 58
End: 2018-05-02

## 2018-06-27 ENCOUNTER — NON-PROVIDER VISIT (OUTPATIENT)
Dept: CARDIOLOGY | Facility: PHYSICIAN GROUP | Age: 58
End: 2018-06-27

## 2018-06-27 VITALS
SYSTOLIC BLOOD PRESSURE: 138 MMHG | WEIGHT: 222 LBS | BODY MASS INDEX: 31.78 KG/M2 | OXYGEN SATURATION: 98 % | DIASTOLIC BLOOD PRESSURE: 80 MMHG | HEIGHT: 70 IN | HEART RATE: 68 BPM

## 2018-06-27 DIAGNOSIS — G47.30 SLEEP APNEA, UNSPECIFIED TYPE: ICD-10-CM

## 2018-06-27 DIAGNOSIS — Z95.818 STATUS POST PLACEMENT OF IMPLANTABLE LOOP RECORDER: ICD-10-CM

## 2018-06-27 DIAGNOSIS — Z95.3 HISTORY OF HEART VALVE REPLACEMENT WITH BIOPROSTHETIC VALVE: ICD-10-CM

## 2018-06-27 DIAGNOSIS — R06.83 SNORING: ICD-10-CM

## 2018-06-27 DIAGNOSIS — R55 SYNCOPE, UNSPECIFIED SYNCOPE TYPE: ICD-10-CM

## 2018-06-27 PROBLEM — T14.8XXA HEMATOMA AND CONTUSION: Status: RESOLVED | Noted: 2018-04-12 | Resolved: 2018-06-27

## 2018-06-27 PROCEDURE — 93298 REM INTERROG DEV EVAL SCRMS: CPT | Performed by: NURSE PRACTITIONER

## 2018-06-27 RX ORDER — CHOLECALCIFEROL (VITAMIN D3) 125 MCG
500 CAPSULE ORAL 2 TIMES DAILY
COMMUNITY
End: 2018-10-24

## 2018-06-27 NOTE — PROGRESS NOTES
ILR interrogation shows no nicole/tachy episodes and no AT/AF. Does show 37 pause episodes, look to be artifact. He has not had any recurrence of syncope or dizziness.    He was recently diagnosed with sleep apnea, and will be following with test center that did sleep study to get set up with CPAP.  If he has any trouble, I offered to refer him to pulmonology ([facility]) to get CPAP. He will let us know.    To FU In 3 months for next ILR interrogation, as well as FU with Dr. ANSON Del Cid to get established.    Collaborating MD: ANSON Del Cid

## 2018-09-26 ENCOUNTER — HOSPITAL ENCOUNTER (OUTPATIENT)
Facility: MEDICAL CENTER | Age: 58
End: 2018-09-27
Attending: EMERGENCY MEDICINE | Admitting: SURGERY
Payer: OTHER MISCELLANEOUS

## 2018-09-26 ENCOUNTER — APPOINTMENT (OUTPATIENT)
Dept: RADIOLOGY | Facility: MEDICAL CENTER | Age: 58
End: 2018-09-26
Attending: EMERGENCY MEDICINE
Payer: OTHER MISCELLANEOUS

## 2018-09-26 DIAGNOSIS — R41.0 CONFUSION: ICD-10-CM

## 2018-09-26 DIAGNOSIS — S51.002A AVULSION OF SKIN OF LEFT ELBOW, INITIAL ENCOUNTER: ICD-10-CM

## 2018-09-26 DIAGNOSIS — S59.902A INJURY OF LEFT ELBOW, INITIAL ENCOUNTER: ICD-10-CM

## 2018-09-26 DIAGNOSIS — S20.219A CONTUSION OF CHEST WALL, UNSPECIFIED LATERALITY, INITIAL ENCOUNTER: ICD-10-CM
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
## 2018-09-26 DIAGNOSIS — S09.90XA CLOSED HEAD INJURY, INITIAL ENCOUNTER: ICD-10-CM

## 2018-09-26 PROBLEM — S06.0X9A CONCUSSION WITH LOSS OF CONSCIOUSNESS: Status: ACTIVE | Noted: 2018-09-26

## 2018-09-26 LAB
ABO GROUP BLD: NORMAL
ABO GROUP BLD: NORMAL
ALBUMIN SERPL BCP-MCNC: 4.1 G/DL (ref 3.2–4.9)
ALBUMIN/GLOB SERPL: 1.6 G/DL
ALP SERPL-CCNC: 67 U/L (ref 30–99)
ALT SERPL-CCNC: 119 U/L (ref 2–50)
ANION GAP SERPL CALC-SCNC: 7 MMOL/L (ref 0–11.9)
APTT PPP: 26.9 SEC (ref 24.7–36)
AST SERPL-CCNC: 120 U/L (ref 12–45)
BILIRUB SERPL-MCNC: 0.7 MG/DL (ref 0.1–1.5)
BLD GP AB SCN SERPL QL: NORMAL
BUN SERPL-MCNC: 20 MG/DL (ref 8–22)
CALCIUM SERPL-MCNC: 8.5 MG/DL (ref 8.5–10.5)
CHLORIDE SERPL-SCNC: 107 MMOL/L (ref 96–112)
CO2 SERPL-SCNC: 25 MMOL/L (ref 20–33)
CREAT SERPL-MCNC: 0.89 MG/DL (ref 0.5–1.4)
ERYTHROCYTE [DISTWIDTH] IN BLOOD BY AUTOMATED COUNT: 36.5 FL (ref 35.9–50)
ETHANOL BLD-MCNC: 0.01 G/DL
GLOBULIN SER CALC-MCNC: 2.5 G/DL (ref 1.9–3.5)
GLUCOSE SERPL-MCNC: 132 MG/DL (ref 65–99)
HCT VFR BLD AUTO: 44.6 % (ref 42–52)
HGB BLD-MCNC: 15.3 G/DL (ref 14–18)
INR PPP: 1.21 (ref 0.87–1.13)
MCH RBC QN AUTO: 28.8 PG (ref 27–33)
MCHC RBC AUTO-ENTMCNC: 34.3 G/DL (ref 33.7–35.3)
MCV RBC AUTO: 83.8 FL (ref 81.4–97.8)
PLATELET # BLD AUTO: 172 K/UL (ref 164–446)
PMV BLD AUTO: 10.3 FL (ref 9–12.9)
POTASSIUM SERPL-SCNC: 3.7 MMOL/L (ref 3.6–5.5)
PROT SERPL-MCNC: 6.6 G/DL (ref 6–8.2)
PROTHROMBIN TIME: 15.4 SEC (ref 12–14.6)
RBC # BLD AUTO: 5.32 M/UL (ref 4.7–6.1)
RH BLD: NORMAL
RH BLD: NORMAL
SODIUM SERPL-SCNC: 139 MMOL/L (ref 135–145)
WBC # BLD AUTO: 6.1 K/UL (ref 4.8–10.8)

## 2018-09-26 PROCEDURE — 700111 HCHG RX REV CODE 636 W/ 250 OVERRIDE (IP): Performed by: EMERGENCY MEDICINE

## 2018-09-26 PROCEDURE — 94760 N-INVAS EAR/PLS OXIMETRY 1: CPT

## 2018-09-26 PROCEDURE — 71045 X-RAY EXAM CHEST 1 VIEW: CPT

## 2018-09-26 PROCEDURE — 86850 RBC ANTIBODY SCREEN: CPT

## 2018-09-26 PROCEDURE — 80053 COMPREHEN METABOLIC PANEL: CPT

## 2018-09-26 PROCEDURE — 304217 HCHG IRRIGATION SYSTEM

## 2018-09-26 PROCEDURE — 72125 CT NECK SPINE W/O DYE: CPT

## 2018-09-26 PROCEDURE — 99291 CRITICAL CARE FIRST HOUR: CPT

## 2018-09-26 PROCEDURE — 501838 HCHG SUTURE GENERAL: Performed by: ORTHOPAEDIC SURGERY

## 2018-09-26 PROCEDURE — 36415 COLL VENOUS BLD VENIPUNCTURE: CPT

## 2018-09-26 PROCEDURE — A6222 GAUZE <=16 IN NO W/SAL W/O B: HCPCS | Performed by: ORTHOPAEDIC SURGERY

## 2018-09-26 PROCEDURE — 85730 THROMBOPLASTIN TIME PARTIAL: CPT

## 2018-09-26 PROCEDURE — 700102 HCHG RX REV CODE 250 W/ 637 OVERRIDE(OP): Performed by: ORTHOPAEDIC SURGERY

## 2018-09-26 PROCEDURE — 700101 HCHG RX REV CODE 250

## 2018-09-26 PROCEDURE — 72128 CT CHEST SPINE W/O DYE: CPT

## 2018-09-26 PROCEDURE — 96375 TX/PRO/DX INJ NEW DRUG ADDON: CPT

## 2018-09-26 PROCEDURE — 700102 HCHG RX REV CODE 250 W/ 637 OVERRIDE(OP): Performed by: SURGERY

## 2018-09-26 PROCEDURE — A9270 NON-COVERED ITEM OR SERVICE: HCPCS | Performed by: ORTHOPAEDIC SURGERY

## 2018-09-26 PROCEDURE — G0378 HOSPITAL OBSERVATION PER HR: HCPCS

## 2018-09-26 PROCEDURE — 303484 HCHG DRESSING LARGE

## 2018-09-26 PROCEDURE — 85027 COMPLETE CBC AUTOMATED: CPT

## 2018-09-26 PROCEDURE — 160027 HCHG SURGERY MINUTES - 1ST 30 MINS LEVEL 2: Performed by: ORTHOPAEDIC SURGERY

## 2018-09-26 PROCEDURE — A9270 NON-COVERED ITEM OR SERVICE: HCPCS | Performed by: SURGERY

## 2018-09-26 PROCEDURE — 85610 PROTHROMBIN TIME: CPT

## 2018-09-26 PROCEDURE — 160048 HCHG OR STATISTICAL LEVEL 1-5: Performed by: ORTHOPAEDIC SURGERY

## 2018-09-26 PROCEDURE — 700112 HCHG RX REV CODE 229: Performed by: SURGERY

## 2018-09-26 PROCEDURE — 96365 THER/PROPH/DIAG IV INF INIT: CPT

## 2018-09-26 PROCEDURE — 80307 DRUG TEST PRSMV CHEM ANLYZR: CPT

## 2018-09-26 PROCEDURE — 70450 CT HEAD/BRAIN W/O DYE: CPT

## 2018-09-26 PROCEDURE — 72131 CT LUMBAR SPINE W/O DYE: CPT

## 2018-09-26 PROCEDURE — 160002 HCHG RECOVERY MINUTES (STAT): Performed by: ORTHOPAEDIC SURGERY

## 2018-09-26 PROCEDURE — 700117 HCHG RX CONTRAST REV CODE 255: Performed by: EMERGENCY MEDICINE

## 2018-09-26 PROCEDURE — 86900 BLOOD TYPING SEROLOGIC ABO: CPT

## 2018-09-26 PROCEDURE — 160038 HCHG SURGERY MINUTES - EA ADDL 1 MIN LEVEL 2: Performed by: ORTHOPAEDIC SURGERY

## 2018-09-26 PROCEDURE — 700111 HCHG RX REV CODE 636 W/ 250 OVERRIDE (IP)

## 2018-09-26 PROCEDURE — 700111 HCHG RX REV CODE 636 W/ 250 OVERRIDE (IP): Performed by: ORTHOPAEDIC SURGERY

## 2018-09-26 PROCEDURE — G0390 TRAUMA RESPONS W/HOSP CRITI: HCPCS

## 2018-09-26 PROCEDURE — 72170 X-RAY EXAM OF PELVIS: CPT

## 2018-09-26 PROCEDURE — 500891 HCHG PACK, ORTHO MAJOR: Performed by: ORTHOPAEDIC SURGERY

## 2018-09-26 PROCEDURE — 73070 X-RAY EXAM OF ELBOW: CPT | Mod: LT

## 2018-09-26 PROCEDURE — 160009 HCHG ANES TIME/MIN: Performed by: ORTHOPAEDIC SURGERY

## 2018-09-26 PROCEDURE — 71260 CT THORAX DX C+: CPT

## 2018-09-26 PROCEDURE — 160035 HCHG PACU - 1ST 60 MINS PHASE I: Performed by: ORTHOPAEDIC SURGERY

## 2018-09-26 PROCEDURE — 86901 BLOOD TYPING SEROLOGIC RH(D): CPT

## 2018-09-26 RX ORDER — HALOPERIDOL 5 MG/ML
1 INJECTION INTRAMUSCULAR
Status: DISCONTINUED | OUTPATIENT
Start: 2018-09-26 | End: 2018-09-26 | Stop reason: HOSPADM

## 2018-09-26 RX ORDER — OXYCODONE HYDROCHLORIDE 5 MG/1
5 TABLET ORAL
Status: DISCONTINUED | OUTPATIENT
Start: 2018-09-26 | End: 2018-09-27 | Stop reason: HOSPADM

## 2018-09-26 RX ORDER — BISACODYL 10 MG
10 SUPPOSITORY, RECTAL RECTAL
Status: DISCONTINUED | OUTPATIENT
Start: 2018-09-26 | End: 2018-09-27 | Stop reason: HOSPADM

## 2018-09-26 RX ORDER — HYDROMORPHONE HYDROCHLORIDE 2 MG/ML
0.2 INJECTION, SOLUTION INTRAMUSCULAR; INTRAVENOUS; SUBCUTANEOUS
Status: DISCONTINUED | OUTPATIENT
Start: 2018-09-26 | End: 2018-09-26 | Stop reason: HOSPADM

## 2018-09-26 RX ORDER — NALOXONE HYDROCHLORIDE 0.4 MG/ML
0.1 INJECTION, SOLUTION INTRAMUSCULAR; INTRAVENOUS; SUBCUTANEOUS PRN
Status: DISCONTINUED | OUTPATIENT
Start: 2018-09-26 | End: 2018-09-26 | Stop reason: HOSPADM

## 2018-09-26 RX ORDER — OXYCODONE HYDROCHLORIDE AND ACETAMINOPHEN 5; 325 MG/1; MG/1
2 TABLET ORAL
Status: DISCONTINUED | OUTPATIENT
Start: 2018-09-26 | End: 2018-09-26 | Stop reason: HOSPADM

## 2018-09-26 RX ORDER — MEPERIDINE HYDROCHLORIDE 25 MG/ML
25 INJECTION INTRAMUSCULAR; INTRAVENOUS; SUBCUTANEOUS ONCE
Status: DISCONTINUED | OUTPATIENT
Start: 2018-09-26 | End: 2018-09-26 | Stop reason: HOSPADM

## 2018-09-26 RX ORDER — GLYCOPYRROLATE 0.2 MG/ML
0.2 INJECTION INTRAMUSCULAR; INTRAVENOUS
Status: DISCONTINUED | OUTPATIENT
Start: 2018-09-26 | End: 2018-09-26 | Stop reason: HOSPADM

## 2018-09-26 RX ORDER — MAGNESIUM HYDROXIDE 1200 MG/15ML
LIQUID ORAL
Status: COMPLETED | OUTPATIENT
Start: 2018-09-26 | End: 2018-09-26

## 2018-09-26 RX ORDER — OXYCODONE HYDROCHLORIDE 10 MG/1
10 TABLET ORAL
Status: DISCONTINUED | OUTPATIENT
Start: 2018-09-26 | End: 2018-09-27 | Stop reason: HOSPADM

## 2018-09-26 RX ORDER — DOCUSATE SODIUM 100 MG/1
100 CAPSULE, LIQUID FILLED ORAL 2 TIMES DAILY
Status: DISCONTINUED | OUTPATIENT
Start: 2018-09-26 | End: 2018-09-27 | Stop reason: HOSPADM

## 2018-09-26 RX ORDER — ENEMA 19; 7 G/133ML; G/133ML
1 ENEMA RECTAL
Status: DISCONTINUED | OUTPATIENT
Start: 2018-09-26 | End: 2018-09-27 | Stop reason: HOSPADM

## 2018-09-26 RX ORDER — AMOXICILLIN 250 MG
1 CAPSULE ORAL
Status: DISCONTINUED | OUTPATIENT
Start: 2018-09-26 | End: 2018-09-27 | Stop reason: HOSPADM

## 2018-09-26 RX ORDER — HYDROMORPHONE HYDROCHLORIDE 2 MG/ML
0.4 INJECTION, SOLUTION INTRAMUSCULAR; INTRAVENOUS; SUBCUTANEOUS
Status: DISCONTINUED | OUTPATIENT
Start: 2018-09-26 | End: 2018-09-26 | Stop reason: HOSPADM

## 2018-09-26 RX ORDER — HYDROMORPHONE HYDROCHLORIDE 2 MG/ML
0.1 INJECTION, SOLUTION INTRAMUSCULAR; INTRAVENOUS; SUBCUTANEOUS
Status: DISCONTINUED | OUTPATIENT
Start: 2018-09-26 | End: 2018-09-26 | Stop reason: HOSPADM

## 2018-09-26 RX ORDER — ONDANSETRON 2 MG/ML
4 INJECTION INTRAMUSCULAR; INTRAVENOUS
Status: DISCONTINUED | OUTPATIENT
Start: 2018-09-26 | End: 2018-09-26 | Stop reason: HOSPADM

## 2018-09-26 RX ORDER — CELECOXIB 200 MG/1
200 CAPSULE ORAL 2 TIMES DAILY WITH MEALS
Status: DISCONTINUED | OUTPATIENT
Start: 2018-09-26 | End: 2018-09-26

## 2018-09-26 RX ORDER — OXYCODONE HYDROCHLORIDE AND ACETAMINOPHEN 5; 325 MG/1; MG/1
1 TABLET ORAL
Status: DISCONTINUED | OUTPATIENT
Start: 2018-09-26 | End: 2018-09-26 | Stop reason: HOSPADM

## 2018-09-26 RX ORDER — ACETAMINOPHEN 500 MG
1000 TABLET ORAL EVERY 6 HOURS
Status: DISCONTINUED | OUTPATIENT
Start: 2018-09-26 | End: 2018-09-26

## 2018-09-26 RX ORDER — HYDROMORPHONE HYDROCHLORIDE 2 MG/ML
0.5 INJECTION, SOLUTION INTRAMUSCULAR; INTRAVENOUS; SUBCUTANEOUS
Status: DISCONTINUED | OUTPATIENT
Start: 2018-09-26 | End: 2018-09-27 | Stop reason: HOSPADM

## 2018-09-26 RX ORDER — CEFAZOLIN SODIUM 2 G/100ML
2 INJECTION, SOLUTION INTRAVENOUS EVERY 8 HOURS
Status: DISCONTINUED | OUTPATIENT
Start: 2018-09-26 | End: 2018-09-27 | Stop reason: HOSPADM

## 2018-09-26 RX ORDER — AMOXICILLIN 250 MG
1 CAPSULE ORAL NIGHTLY
Status: DISCONTINUED | OUTPATIENT
Start: 2018-09-26 | End: 2018-09-27 | Stop reason: HOSPADM

## 2018-09-26 RX ORDER — POLYETHYLENE GLYCOL 3350 17 G/17G
1 POWDER, FOR SOLUTION ORAL 2 TIMES DAILY
Status: DISCONTINUED | OUTPATIENT
Start: 2018-09-26 | End: 2018-09-27 | Stop reason: HOSPADM

## 2018-09-26 RX ORDER — LABETALOL HYDROCHLORIDE 5 MG/ML
5 INJECTION, SOLUTION INTRAVENOUS
Status: DISCONTINUED | OUTPATIENT
Start: 2018-09-26 | End: 2018-09-26 | Stop reason: HOSPADM

## 2018-09-26 RX ORDER — DIPHENHYDRAMINE HCL 25 MG
25 TABLET ORAL EVERY 6 HOURS PRN
Status: DISCONTINUED | OUTPATIENT
Start: 2018-09-26 | End: 2018-09-26 | Stop reason: HOSPADM

## 2018-09-26 RX ORDER — ONDANSETRON 2 MG/ML
4 INJECTION INTRAMUSCULAR; INTRAVENOUS EVERY 4 HOURS PRN
Status: DISCONTINUED | OUTPATIENT
Start: 2018-09-26 | End: 2018-09-27 | Stop reason: HOSPADM

## 2018-09-26 RX ORDER — HYDRALAZINE HYDROCHLORIDE 20 MG/ML
5 INJECTION INTRAMUSCULAR; INTRAVENOUS
Status: DISCONTINUED | OUTPATIENT
Start: 2018-09-26 | End: 2018-09-26 | Stop reason: HOSPADM

## 2018-09-26 RX ORDER — ACETAMINOPHEN 325 MG/1
650 TABLET ORAL EVERY 6 HOURS
Status: DISCONTINUED | OUTPATIENT
Start: 2018-09-26 | End: 2018-09-27 | Stop reason: HOSPADM

## 2018-09-26 RX ORDER — SODIUM CHLORIDE, SODIUM LACTATE, POTASSIUM CHLORIDE, CALCIUM CHLORIDE 600; 310; 30; 20 MG/100ML; MG/100ML; MG/100ML; MG/100ML
INJECTION, SOLUTION INTRAVENOUS CONTINUOUS
Status: DISCONTINUED | OUTPATIENT
Start: 2018-09-26 | End: 2018-09-26 | Stop reason: HOSPADM

## 2018-09-26 RX ORDER — CEFAZOLIN SODIUM 2 G/100ML
2 INJECTION, SOLUTION INTRAVENOUS ONCE
Status: COMPLETED | OUTPATIENT
Start: 2018-09-26 | End: 2018-09-26

## 2018-09-26 RX ORDER — KETOROLAC TROMETHAMINE 30 MG/ML
30 INJECTION, SOLUTION INTRAMUSCULAR; INTRAVENOUS EVERY 6 HOURS
Status: DISCONTINUED | OUTPATIENT
Start: 2018-09-26 | End: 2018-09-27 | Stop reason: HOSPADM

## 2018-09-26 RX ADMIN — POLYETHYLENE GLYCOL 3350 1 PACKET: 17 POWDER, FOR SOLUTION ORAL at 18:29

## 2018-09-26 RX ADMIN — IOHEXOL 100 ML: 350 INJECTION, SOLUTION INTRAVENOUS at 13:28

## 2018-09-26 RX ADMIN — OXYCODONE HYDROCHLORIDE 5 MG: 5 TABLET ORAL at 21:56

## 2018-09-26 RX ADMIN — CEFAZOLIN SODIUM 2 G: 2 INJECTION, SOLUTION INTRAVENOUS at 12:55

## 2018-09-26 RX ADMIN — MAGNESIUM HYDROXIDE 30 ML: 400 SUSPENSION ORAL at 18:27

## 2018-09-26 RX ADMIN — DOCUSATE SODIUM 100 MG: 100 CAPSULE, LIQUID FILLED ORAL at 18:28

## 2018-09-26 RX ADMIN — ACETAMINOPHEN 650 MG: 325 TABLET, FILM COATED ORAL at 18:27

## 2018-09-26 RX ADMIN — KETOROLAC TROMETHAMINE 30 MG: 30 INJECTION, SOLUTION INTRAMUSCULAR at 18:28

## 2018-09-26 RX ADMIN — CEFAZOLIN SODIUM 2 G: 2 INJECTION, SOLUTION INTRAVENOUS at 18:28

## 2018-09-26 ASSESSMENT — PAIN SCALES - GENERAL
PAINLEVEL_OUTOF10: 5
PAINLEVEL_OUTOF10: 0
PAINLEVEL_OUTOF10: 0
PAINLEVEL_OUTOF10: 1
PAINLEVEL_OUTOF10: 0
PAINLEVEL_OUTOF10: 1
PAINLEVEL_OUTOF10: 5
PAINLEVEL_OUTOF10: 2
PAINLEVEL_OUTOF10: 2
PAINLEVEL_OUTOF10: 0
PAINLEVEL_OUTOF10: 3

## 2018-09-26 ASSESSMENT — LIFESTYLE VARIABLES: EVER_SMOKED: YES

## 2018-09-26 ASSESSMENT — COPD QUESTIONNAIRES
COPD SCREENING SCORE: 4
DO YOU EVER COUGH UP ANY MUCUS OR PHLEGM?: NO/ONLY WITH OCCASIONAL COLDS OR INFECTIONS
HAVE YOU SMOKED AT LEAST 100 CIGARETTES IN YOUR ENTIRE LIFE: YES
DURING THE PAST 4 WEEKS HOW MUCH DID YOU FEEL SHORT OF BREATH: NONE/LITTLE OF THE TIME
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
## 2018-09-26 NOTE — OR NURSING
Patient is doing well in recovery. He has a very flat affect. No medications were given in PACU. He is tired and just wanting to sleep
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
## 2018-09-27 VITALS
BODY MASS INDEX: 32.82 KG/M2 | HEIGHT: 70 IN | OXYGEN SATURATION: 97 % | SYSTOLIC BLOOD PRESSURE: 131 MMHG | TEMPERATURE: 98.3 F | RESPIRATION RATE: 17 BRPM | DIASTOLIC BLOOD PRESSURE: 78 MMHG | HEART RATE: 64 BPM | WEIGHT: 229.28 LBS

## 2018-09-27 PROBLEM — S06.0X9A CONCUSSION WITH LOSS OF CONSCIOUSNESS: Status: RESOLVED | Noted: 2018-09-26 | Resolved: 2018-09-27

## 2018-09-27 PROCEDURE — 96376 TX/PRO/DX INJ SAME DRUG ADON: CPT

## 2018-09-27 PROCEDURE — A9270 NON-COVERED ITEM OR SERVICE: HCPCS | Performed by: ORTHOPAEDIC SURGERY

## 2018-09-27 PROCEDURE — 96375 TX/PRO/DX INJ NEW DRUG ADDON: CPT

## 2018-09-27 PROCEDURE — 700111 HCHG RX REV CODE 636 W/ 250 OVERRIDE (IP): Performed by: ORTHOPAEDIC SURGERY

## 2018-09-27 PROCEDURE — 96365 THER/PROPH/DIAG IV INF INIT: CPT

## 2018-09-27 PROCEDURE — 700102 HCHG RX REV CODE 250 W/ 637 OVERRIDE(OP): Performed by: ORTHOPAEDIC SURGERY

## 2018-09-27 PROCEDURE — G0378 HOSPITAL OBSERVATION PER HR: HCPCS

## 2018-09-27 RX ORDER — PSEUDOEPHEDRINE HCL 30 MG
100 TABLET ORAL 2 TIMES DAILY
Qty: 60 CAP | Refills: 0 | Status: SHIPPED | OUTPATIENT
Start: 2018-09-27 | End: 2018-12-10 | Stop reason: CLARIF

## 2018-09-27 RX ORDER — ACETAMINOPHEN 325 MG/1
650 TABLET ORAL EVERY 6 HOURS
Qty: 30 TAB | Refills: 0 | Status: SHIPPED | OUTPATIENT
Start: 2018-09-27 | End: 2018-12-10 | Stop reason: CLARIF

## 2018-09-27 RX ORDER — OXYCODONE HYDROCHLORIDE 5 MG/1
5 TABLET ORAL EVERY 8 HOURS PRN
Qty: 30 TAB | Refills: 0 | Status: SHIPPED | OUTPATIENT
Start: 2018-09-27 | End: 2018-10-11

## 2018-09-27 RX ORDER — SODIUM CHLORIDE 9 MG/ML
INJECTION, SOLUTION INTRAVENOUS
Status: ACTIVE
Start: 2018-09-27 | End: 2018-09-27

## 2018-09-27 RX ORDER — CEPHALEXIN 500 MG/1
500 CAPSULE ORAL 4 TIMES DAILY
Qty: 20 CAP | Refills: 0 | Status: SHIPPED | OUTPATIENT
Start: 2018-09-27 | End: 2018-12-10 | Stop reason: CLARIF

## 2018-09-27 RX ADMIN — KETOROLAC TROMETHAMINE 30 MG: 30 INJECTION, SOLUTION INTRAMUSCULAR at 05:43

## 2018-09-27 RX ADMIN — CEFAZOLIN SODIUM 2 G: 2 INJECTION, SOLUTION INTRAVENOUS at 01:17

## 2018-09-27 RX ADMIN — ACETAMINOPHEN 650 MG: 325 TABLET, FILM COATED ORAL at 05:43

## 2018-09-27 RX ADMIN — KETOROLAC TROMETHAMINE 30 MG: 30 INJECTION, SOLUTION INTRAMUSCULAR at 01:17

## 2018-09-27 RX ADMIN — ACETAMINOPHEN 650 MG: 325 TABLET, FILM COATED ORAL at 01:17

## 2018-09-27 ASSESSMENT — COGNITIVE AND FUNCTIONAL STATUS - GENERAL
DAILY ACTIVITIY SCORE: 24
SUGGESTED CMS G CODE MODIFIER MOBILITY: CH
MOBILITY SCORE: 24
SUGGESTED CMS G CODE MODIFIER DAILY ACTIVITY: CH

## 2018-09-27 ASSESSMENT — LIFESTYLE VARIABLES
HOW MANY TIMES IN THE PAST YEAR HAVE YOU HAD 5 OR MORE DRINKS IN A DAY: 0
CONSUMPTION TOTAL: NEGATIVE
TOTAL SCORE: 0
EVER FELT BAD OR GUILTY ABOUT YOUR DRINKING: NO
AVERAGE NUMBER OF DAYS PER WEEK YOU HAVE A DRINK CONTAINING ALCOHOL: 1
EVER_SMOKED: YES
HAVE YOU EVER FELT YOU SHOULD CUT DOWN ON YOUR DRINKING: NO
HAVE PEOPLE ANNOYED YOU BY CRITICIZING YOUR DRINKING: NO
TOTAL SCORE: 0
EVER HAD A DRINK FIRST THING IN THE MORNING TO STEADY YOUR NERVES TO GET RID OF A HANGOVER: NO
ON A TYPICAL DAY WHEN YOU DRINK ALCOHOL HOW MANY DRINKS DO YOU HAVE: 2
TOTAL SCORE: 0
ALCOHOL_USE: YES

## 2018-09-27 ASSESSMENT — PATIENT HEALTH QUESTIONNAIRE - PHQ9
2. FEELING DOWN, DEPRESSED, IRRITABLE, OR HOPELESS: NOT AT ALL
SUM OF ALL RESPONSES TO PHQ9 QUESTIONS 1 AND 2: 0
1. LITTLE INTEREST OR PLEASURE IN DOING THINGS: NOT AT ALL

## 2018-09-27 ASSESSMENT — PAIN SCALES - GENERAL
PAINLEVEL_OUTOF10: 0

## 2018-09-27 ASSESSMENT — ENCOUNTER SYMPTOMS
MEMORY LOSS: 1
MYALGIAS: 1

## 2018-09-27 NOTE — DISCHARGE INSTRUCTIONS
* Follow up with Dr. Elizabeth in 10-14 days for wound recheck.  * Follow up with primary care doctor as soon as possible   * Weight bearing as tolerated to Left arm.  * Keep dressing in place until follow up appointment  * Take prescriptions as prescribed      Discharge Instructions    Discharged to home by car with relative. Discharged via wheelchair, hospital escort: Yes.  Special equipment needed: Not Applicable    Be sure to schedule a follow-up appointment with your primary care doctor or any specialists as instructed.     Discharge Plan:   Diet Plan: Discussed  Activity Level: Discussed  Confirmed Follow up Appointment: Patient to Call and Schedule Appointment  Confirmed Symptoms Management: Discussed  Medication Reconciliation Updated: Yes  Influenza Vaccine Indication: (P) Patient Refuses    I understand that a diet low in cholesterol, fat, and sodium is recommended for good health. Unless I have been given specific instructions below for another diet, I accept this instruction as my diet prescription.   Other diet: regular    Special Instructions: Discharge instructions for the Orthopedic Patient    Follow up with Primary Care Physician within 2 weeks of discharge to home, regarding:  Review of medications and diagnostic testing.  Surveillance for medical complications.  Workup and treatment of osteoporosis, if appropriate.     -Is this a Joint Replacement patient? No    -Is this patient being discharged with medication to prevent blood clots?  No    · Is patient discharged on Warfarin / Coumadin?   No     Depression / Suicide Risk    As you are discharged from this [facility] facility, it is important to learn how to keep safe from harming yourself.    Recognize the warning signs:  · Abrupt changes in personality, positive or negative- including increase in energy   · Giving away possessions  · Change in eating patterns- significant weight changes-  positive or negative  · Change in sleeping patterns-  unable to sleep or sleeping all the time   · Unwillingness or inability to communicate  · Depression  · Unusual sadness, discouragement and loneliness  · Talk of wanting to die  · Neglect of personal appearance   · Rebelliousness- reckless behavior  · Withdrawal from people/activities they love  · Confusion- inability to concentrate     If you or a loved one observes any of these behaviors or has concerns about self-harm, here's what you can do:  · Talk about it- your feelings and reasons for harming yourself  · Remove any means that you might use to hurt yourself (examples: pills, rope, extension cords, firearm)  · Get professional help from the community (Mental Health, Substance Abuse, psychological counseling)  · Do not be alone:Call your Safe Contact- someone whom you trust who will be there for you.  · Call your local CRISIS HOTLINE 049-0909 or 924-093-9837  · Call your local Children's Mobile Crisis Response Team [place] (275) 730-9558 or www.[employer]  · Call the toll free National Suicide Prevention Hotlines   · National Suicide Prevention Lifeline 268-398-WEIV (0241)  · National Hope Line Network 800-SUICIDE (287-2329)          Incision Care, Adult  An incision is a cut that a doctor makes in your skin for surgery (for a procedure). Most times, these cuts are closed after surgery. Your cut from surgery may be closed with stitches (sutures), staples, skin glue, or skin tape (adhesive strips). You may need to return to your doctor to have stitches or staples taken out. This may happen many days or many weeks after your surgery. The cut needs to be well cared for so it does not get infected.  How to care for your cut  Cut care  · Follow instructions from your doctor about how to take care of your cut. Make sure you:  ¨ Wash your hands with soap and water before you change your bandage (dressing). If you cannot use soap and water, use hand .  ¨ Change your bandage as told by your  doctor.  ¨ Leave stitches, skin glue, or skin tape in place. They may need to stay in place for 2 weeks or longer. If tape strips get loose and curl up, you may trim the loose edges. Do not remove tape strips completely unless your doctor says it is okay.  · Check your cut area every day for signs of infection. Check for:  ¨ More redness, swelling, or pain.  ¨ More fluid or blood.  ¨ Warmth.  ¨ Pus or a bad smell.  · Ask your doctor how to clean the cut. This may include:  ¨ Using mild soap and water.  ¨ Using a clean towel to pat the cut dry after you clean it.  ¨ Putting a cream or ointment on the cut. Do this only as told by your doctor.  ¨ Covering the cut with a clean bandage.  · Ask your doctor when you can leave the cut uncovered.  · Do not take baths, swim, or use a hot tub until your doctor says it is okay. Ask your doctor if you can take showers. You may only be allowed to take sponge baths for bathing.  Medicines  · If you were prescribed an antibiotic medicine, cream, or ointment, take the antibiotic or put it on the cut as told by your doctor. Do not stop taking or putting on the antibiotic even if your condition gets better.  · Take over-the-counter and prescription medicines only as told by your doctor.  General instructions  · Limit movement around your cut. This helps healing.  ¨ Avoid straining, lifting, or exercise for the first month, or for as long as told by your doctor.  ¨ Follow instructions from your doctor about going back to your normal activities.  ¨ Ask your doctor what activities are safe.  · Protect your cut from the sun when you are outside for the first 6 months, or for as long as told by your doctor. Put on sunscreen around the scar or cover up the scar.  · Keep all follow-up visits as told by your doctor. This is important.  Contact a doctor if:  · Your have more redness, swelling, or pain around the cut.  · You have more fluid or blood coming from the cut.  · Your cut feels warm  to the touch.  · You have pus or a bad smell coming from the cut.  · You have a fever or shaking chills.  · You feel sick to your stomach (nauseous) or you throw up (vomit).  · You are dizzy.  · Your stitches or staples come undone.  Get help right away if:  · You have a red streak coming from your cut.  · Your cut bleeds through the bandage and the bleeding does not stop with gentle pressure.  · The edges of your cut open up and separate.  · You have very bad (severe) pain.  · You have a rash.  · You are confused.  · You pass out (faint).  · You have trouble breathing and you have a fast heartbeat.  This information is not intended to replace advice given to you by your health care provider. Make sure you discuss any questions you have with your health care provider.  Document Released: 03/11/2013 Document Revised: 08/25/2017 Document Reviewed: 08/25/2017  [employer] Interactive Patient Education © 2017 [employer]
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
## 2018-09-27 NOTE — PROGRESS NOTES
2 RN skin assessment completed with Yeny. Rt elbow laceration, rt chest laceration/brusing, left upper bruising, left lateral ankle laceration and left elbow drsg.
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
## 2018-09-27 NOTE — CARE PLAN
Problem: Communication  Goal: The ability to communicate needs accurately and effectively will improve    Intervention: Educate patient and significant other/support system about the plan of care, procedures, treatments, medications and allow for questions  POC discussed with pt including unit routine, medications, and plans for DC today.      Problem: Infection  Goal: Will remain free from infection    Intervention: Implement standard precautions and perform hand washing before and after patient contact  Foam in/foam out

## 2018-09-27 NOTE — RESPIRATORY CARE
COPD EDUCATION by COPD CLINICAL EDUCATOR  9/27/2018 at 7:58 AM by Charu Hdez     Patient reviewed by COPD education team. Patient does not qualify for COPD program.
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
## 2018-09-27 NOTE — PROGRESS NOTES
Pt discharged per active order. Instructions reviewed with pt, signed copy in chart. Prescriptions given to pt, signed consent in chart. All questions addressed.

## 2018-10-24 ENCOUNTER — OFFICE VISIT (OUTPATIENT)
Dept: CARDIOLOGY | Facility: PHYSICIAN GROUP | Age: 58
End: 2018-10-24

## 2018-10-24 ENCOUNTER — NON-PROVIDER VISIT (OUTPATIENT)
Dept: CARDIOLOGY | Facility: PHYSICIAN GROUP | Age: 58
End: 2018-10-24

## 2018-10-24 VITALS
HEART RATE: 78 BPM | BODY MASS INDEX: 33.5 KG/M2 | HEIGHT: 70 IN | SYSTOLIC BLOOD PRESSURE: 138 MMHG | DIASTOLIC BLOOD PRESSURE: 84 MMHG | WEIGHT: 234 LBS | OXYGEN SATURATION: 96 %

## 2018-10-24 VITALS
HEART RATE: 78 BPM | WEIGHT: 234 LBS | OXYGEN SATURATION: 96 % | BODY MASS INDEX: 33.5 KG/M2 | HEIGHT: 70 IN | SYSTOLIC BLOOD PRESSURE: 138 MMHG | DIASTOLIC BLOOD PRESSURE: 84 MMHG

## 2018-10-24 DIAGNOSIS — Z98.890 HISTORY OF REPAIR OF THORACIC AORTIC ANEURYSM: ICD-10-CM

## 2018-10-24 DIAGNOSIS — I25.83 CORONARY ARTERY DISEASE DUE TO LIPID RICH PLAQUE: ICD-10-CM

## 2018-10-24 DIAGNOSIS — Z95.3 HISTORY OF HEART VALVE REPLACEMENT WITH BIOPROSTHETIC VALVE: ICD-10-CM

## 2018-10-24 DIAGNOSIS — Z86.79 HISTORY OF REPAIR OF THORACIC AORTIC ANEURYSM: ICD-10-CM

## 2018-10-24 DIAGNOSIS — I49.5 SICK SINUS SYNDROME (HCC): Primary | ICD-10-CM

## 2018-10-24 DIAGNOSIS — I71.20 THORACIC AORTIC ANEURYSM WITHOUT RUPTURE (HCC): ICD-10-CM

## 2018-10-24 DIAGNOSIS — I25.10 CORONARY ARTERY DISEASE DUE TO LIPID RICH PLAQUE: ICD-10-CM

## 2018-10-24 DIAGNOSIS — Z95.818 STATUS POST PLACEMENT OF IMPLANTABLE LOOP RECORDER: ICD-10-CM

## 2018-10-24 DIAGNOSIS — R55 SYNCOPE, UNSPECIFIED SYNCOPE TYPE: ICD-10-CM

## 2018-10-24 PROCEDURE — 93298 REM INTERROG DEV EVAL SCRMS: CPT | Performed by: NURSE PRACTITIONER

## 2018-10-24 PROCEDURE — 99214 OFFICE O/P EST MOD 30 MIN: CPT | Performed by: INTERNAL MEDICINE

## 2018-10-24 ASSESSMENT — ENCOUNTER SYMPTOMS
BLURRED VISION: 1
LOSS OF CONSCIOUSNESS: 1
DEPRESSION: 1
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
## 2018-10-24 NOTE — PROGRESS NOTES
Patient continues to have syncopal episodes.    Device interrogation shows no AT/AF and no nicole or tachy episodes, but 26 pauses. Most are during the night when sleeping, but there are a couple during the day, including 8 seconds and 13 seconds. Previous pauses on interrogation looked to be artifact.    Discussed above with Dr. ANSON Del Cid, who agreed with EP referral.    Collaborating MD: ANSON Del Cid

## 2018-12-10 ENCOUNTER — HOSPITAL ENCOUNTER (OUTPATIENT)
Facility: MEDICAL CENTER | Age: 58
End: 2018-12-12
Attending: EMERGENCY MEDICINE | Admitting: INTERNAL MEDICINE

## 2018-12-10 ENCOUNTER — APPOINTMENT (OUTPATIENT)
Dept: RADIOLOGY | Facility: MEDICAL CENTER | Age: 58
End: 2018-12-10
Attending: EMERGENCY MEDICINE

## 2018-12-10 ENCOUNTER — TELEPHONE (OUTPATIENT)
Dept: CARDIOLOGY | Facility: MEDICAL CENTER | Age: 58
End: 2018-12-10

## 2018-12-10 DIAGNOSIS — I49.5 SICK SINUS SYNDROME (HCC): ICD-10-CM

## 2018-12-10 DIAGNOSIS — R42 LIGHTHEADEDNESS: ICD-10-CM

## 2018-12-10 PROBLEM — I45.5 SINUS PAUSE: Status: ACTIVE | Noted: 2018-12-10

## 2018-12-10 LAB
ALBUMIN SERPL BCP-MCNC: 4.3 G/DL (ref 3.2–4.9)
ALBUMIN/GLOB SERPL: 1.7 G/DL
ALP SERPL-CCNC: 87 U/L (ref 30–99)
ALT SERPL-CCNC: 37 U/L (ref 2–50)
ANION GAP SERPL CALC-SCNC: 10 MMOL/L (ref 0–11.9)
APTT PPP: 30.4 SEC (ref 24.7–36)
AST SERPL-CCNC: 22 U/L (ref 12–45)
BASOPHILS # BLD AUTO: 0.5 % (ref 0–1.8)
BASOPHILS # BLD: 0.03 K/UL (ref 0–0.12)
BILIRUB SERPL-MCNC: 0.7 MG/DL (ref 0.1–1.5)
BNP SERPL-MCNC: 35 PG/ML (ref 0–100)
BUN SERPL-MCNC: 20 MG/DL (ref 8–22)
CALCIUM SERPL-MCNC: 9.1 MG/DL (ref 8.5–10.5)
CHLORIDE SERPL-SCNC: 109 MMOL/L (ref 96–112)
CO2 SERPL-SCNC: 22 MMOL/L (ref 20–33)
CREAT SERPL-MCNC: 0.78 MG/DL (ref 0.5–1.4)
EOSINOPHIL # BLD AUTO: 0.09 K/UL (ref 0–0.51)
EOSINOPHIL NFR BLD: 1.5 % (ref 0–6.9)
ERYTHROCYTE [DISTWIDTH] IN BLOOD BY AUTOMATED COUNT: 38.4 FL (ref 35.9–50)
GLOBULIN SER CALC-MCNC: 2.5 G/DL (ref 1.9–3.5)
GLUCOSE SERPL-MCNC: 115 MG/DL (ref 65–99)
HCT VFR BLD AUTO: 44.5 % (ref 42–52)
HGB BLD-MCNC: 15.3 G/DL (ref 14–18)
IMM GRANULOCYTES # BLD AUTO: 0.01 K/UL (ref 0–0.11)
IMM GRANULOCYTES NFR BLD AUTO: 0.2 % (ref 0–0.9)
INR PPP: 1.2 (ref 0.87–1.13)
LIPASE SERPL-CCNC: 11 U/L (ref 11–82)
LYMPHOCYTES # BLD AUTO: 1.58 K/UL (ref 1–4.8)
LYMPHOCYTES NFR BLD: 26.8 % (ref 22–41)
MCH RBC QN AUTO: 28.8 PG (ref 27–33)
MCHC RBC AUTO-ENTMCNC: 34.4 G/DL (ref 33.7–35.3)
MCV RBC AUTO: 83.6 FL (ref 81.4–97.8)
MONOCYTES # BLD AUTO: 0.49 K/UL (ref 0–0.85)
MONOCYTES NFR BLD AUTO: 8.3 % (ref 0–13.4)
NEUTROPHILS # BLD AUTO: 3.69 K/UL (ref 1.82–7.42)
NEUTROPHILS NFR BLD: 62.7 % (ref 44–72)
NRBC # BLD AUTO: 0 K/UL
NRBC BLD-RTO: 0 /100 WBC
PLATELET # BLD AUTO: 183 K/UL (ref 164–446)
PMV BLD AUTO: 10.6 FL (ref 9–12.9)
POTASSIUM SERPL-SCNC: 3.7 MMOL/L (ref 3.6–5.5)
PROT SERPL-MCNC: 6.8 G/DL (ref 6–8.2)
PROTHROMBIN TIME: 15.3 SEC (ref 12–14.6)
RBC # BLD AUTO: 5.32 M/UL (ref 4.7–6.1)
SODIUM SERPL-SCNC: 141 MMOL/L (ref 135–145)
TROPONIN I SERPL-MCNC: 0.01 NG/ML (ref 0–0.04)
WBC # BLD AUTO: 5.9 K/UL (ref 4.8–10.8)

## 2018-12-10 PROCEDURE — 80053 COMPREHEN METABOLIC PANEL: CPT

## 2018-12-10 PROCEDURE — 85025 COMPLETE CBC W/AUTO DIFF WBC: CPT

## 2018-12-10 PROCEDURE — 99220 PR INITIAL OBSERVATION CARE,LEVL III: CPT | Performed by: INTERNAL MEDICINE

## 2018-12-10 PROCEDURE — 93005 ELECTROCARDIOGRAM TRACING: CPT

## 2018-12-10 PROCEDURE — 83880 ASSAY OF NATRIURETIC PEPTIDE: CPT

## 2018-12-10 PROCEDURE — 36415 COLL VENOUS BLD VENIPUNCTURE: CPT

## 2018-12-10 PROCEDURE — 83690 ASSAY OF LIPASE: CPT

## 2018-12-10 PROCEDURE — G0378 HOSPITAL OBSERVATION PER HR: HCPCS

## 2018-12-10 PROCEDURE — 85730 THROMBOPLASTIN TIME PARTIAL: CPT

## 2018-12-10 PROCEDURE — 84484 ASSAY OF TROPONIN QUANT: CPT

## 2018-12-10 PROCEDURE — 71045 X-RAY EXAM CHEST 1 VIEW: CPT

## 2018-12-10 PROCEDURE — 99285 EMERGENCY DEPT VISIT HI MDM: CPT

## 2018-12-10 PROCEDURE — 99215 OFFICE O/P EST HI 40 MIN: CPT | Mod: 57 | Performed by: INTERNAL MEDICINE

## 2018-12-10 PROCEDURE — 85610 PROTHROMBIN TIME: CPT

## 2018-12-10 PROCEDURE — 93005 ELECTROCARDIOGRAM TRACING: CPT | Performed by: EMERGENCY MEDICINE

## 2018-12-10 RX ORDER — ACETAMINOPHEN 325 MG/1
650 TABLET ORAL EVERY 6 HOURS PRN
Status: DISCONTINUED | OUTPATIENT
Start: 2018-12-10 | End: 2018-12-12 | Stop reason: HOSPADM

## 2018-12-10 RX ORDER — BISACODYL 10 MG
10 SUPPOSITORY, RECTAL RECTAL
Status: DISCONTINUED | OUTPATIENT
Start: 2018-12-10 | End: 2018-12-12 | Stop reason: HOSPADM

## 2018-12-10 RX ORDER — AMOXICILLIN 250 MG
2 CAPSULE ORAL 2 TIMES DAILY
Status: DISCONTINUED | OUTPATIENT
Start: 2018-12-10 | End: 2018-12-12 | Stop reason: HOSPADM

## 2018-12-10 RX ORDER — POLYETHYLENE GLYCOL 3350 17 G/17G
1 POWDER, FOR SOLUTION ORAL
Status: DISCONTINUED | OUTPATIENT
Start: 2018-12-10 | End: 2018-12-12 | Stop reason: HOSPADM

## 2018-12-10 ASSESSMENT — COGNITIVE AND FUNCTIONAL STATUS - GENERAL
SUGGESTED CMS G CODE MODIFIER MOBILITY: CH
MOBILITY SCORE: 24
DAILY ACTIVITIY SCORE: 24
SUGGESTED CMS G CODE MODIFIER DAILY ACTIVITY: CH

## 2018-12-10 ASSESSMENT — PATIENT HEALTH QUESTIONNAIRE - PHQ9
9. THOUGHTS THAT YOU WOULD BE BETTER OFF DEAD, OR OF HURTING YOURSELF: NOT AT ALL
SUM OF ALL RESPONSES TO PHQ QUESTIONS 1-9: 3
6. FEELING BAD ABOUT YOURSELF - OR THAT YOU ARE A FAILURE OR HAVE LET YOURSELF OR YOUR FAMILY DOWN: NOT AL ALL
5. POOR APPETITE OR OVEREATING: NOT AT ALL
SUM OF ALL RESPONSES TO PHQ9 QUESTIONS 1 AND 2: 1
8. MOVING OR SPEAKING SO SLOWLY THAT OTHER PEOPLE COULD HAVE NOTICED. OR THE OPPOSITE, BEING SO FIGETY OR RESTLESS THAT YOU HAVE BEEN MOVING AROUND A LOT MORE THAN USUAL: NOT AT ALL
3. TROUBLE FALLING OR STAYING ASLEEP OR SLEEPING TOO MUCH: SEVERAL DAYS
7. TROUBLE CONCENTRATING ON THINGS, SUCH AS READING THE NEWSPAPER OR WATCHING TELEVISION: NOT AT ALL
4. FEELING TIRED OR HAVING LITTLE ENERGY: SEVERAL DAYS
2. FEELING DOWN, DEPRESSED, IRRITABLE, OR HOPELESS: NOT AT ALL
1. LITTLE INTEREST OR PLEASURE IN DOING THINGS: SEVERAL DAYS

## 2018-12-10 ASSESSMENT — ENCOUNTER SYMPTOMS
NAUSEA: 0
SHORTNESS OF BREATH: 1
ABDOMINAL PAIN: 0
HEADACHES: 0
SORE THROAT: 0
FEVER: 0
VOMITING: 0
CHILLS: 0

## 2018-12-10 ASSESSMENT — PAIN SCALES - GENERAL
PAINLEVEL_OUTOF10: 0
PAINLEVEL_OUTOF10: 0

## 2018-12-10 ASSESSMENT — LIFESTYLE VARIABLES
EVER_SMOKED: YES
DO YOU DRINK ALCOHOL: NO

## 2018-12-10 NOTE — TELEPHONE ENCOUNTER
Durga Leal, [facility]  JUAN JOSE [place] pt is very confused and daughter is calling to get some clarification on recent recommendations for pt. She was told by pt that he may need a pacer, concerns with oxygen, and some other questions.     She would like to request a call back at [phone].     Thanks!   Durga [phone]      Called pt's daughter (Bridget), discussed above conversation with pt and loop recorder interrogation and OV notes from Dr Del Cid 10/24/18, daughter verbalizes understanding and she agreed pt needs to go to ER, she lives in [place], she will have someone drive pt to ER.

## 2018-12-10 NOTE — TELEPHONE ENCOUNTER
"  Tamara Guzman, [facility]  Anita [place], R.HUNTER.   Phone Number: [phone]             Jose Howard,     Mr. Perrin called this morning to say that for the past week he has been very lightheaded and dizzy; he said he has also been told by family members he appears to be \"spaced out\". Patient wanted to discuss symptoms to see if this is something he should be going to the E.R for     Thanks,     Tamara      Called pt, pt reports he is feeling more dizzy and lightheaded, spaced out and feeling fatigue which more than usual, he is not scheduled to see EP until 1/16/19. Advise pt to go to ER if he feels like symptoms is worsened due to his history of long pauses seen on ILR, pt agreed.     FYI to Dr Del Cid   "

## 2018-12-10 NOTE — ED TRIAGE NOTES
59 y/o male ambulatory to triage with c/o feeling lightheaded, tired and having a near syncopal episode. Pt states he has a significant cardiac history, he was sent in by Dr. Del Cid for evaluation.

## 2018-12-11 ENCOUNTER — PATIENT OUTREACH (OUTPATIENT)
Dept: HEALTH INFORMATION MANAGEMENT | Facility: OTHER | Age: 58
End: 2018-12-11

## 2018-12-11 ENCOUNTER — APPOINTMENT (OUTPATIENT)
Dept: RADIOLOGY | Facility: MEDICAL CENTER | Age: 58
End: 2018-12-11
Attending: INTERNAL MEDICINE

## 2018-12-11 LAB
ANION GAP SERPL CALC-SCNC: 10 MMOL/L (ref 0–11.9)
BUN SERPL-MCNC: 18 MG/DL (ref 8–22)
CALCIUM SERPL-MCNC: 9 MG/DL (ref 8.5–10.5)
CHLORIDE SERPL-SCNC: 108 MMOL/L (ref 96–112)
CO2 SERPL-SCNC: 23 MMOL/L (ref 20–33)
CREAT SERPL-MCNC: 0.69 MG/DL (ref 0.5–1.4)
EKG IMPRESSION: NORMAL
ERYTHROCYTE [DISTWIDTH] IN BLOOD BY AUTOMATED COUNT: 38.8 FL (ref 35.9–50)
GLUCOSE SERPL-MCNC: 90 MG/DL (ref 65–99)
HCT VFR BLD AUTO: 44.1 % (ref 42–52)
HGB BLD-MCNC: 14.2 G/DL (ref 14–18)
MCH RBC QN AUTO: 27.4 PG (ref 27–33)
MCHC RBC AUTO-ENTMCNC: 32.2 G/DL (ref 33.7–35.3)
MCV RBC AUTO: 85 FL (ref 81.4–97.8)
PLATELET # BLD AUTO: 170 K/UL (ref 164–446)
PMV BLD AUTO: 10.3 FL (ref 9–12.9)
POTASSIUM SERPL-SCNC: 4 MMOL/L (ref 3.6–5.5)
RBC # BLD AUTO: 5.19 M/UL (ref 4.7–6.1)
SODIUM SERPL-SCNC: 141 MMOL/L (ref 135–145)
WBC # BLD AUTO: 5.1 K/UL (ref 4.8–10.8)

## 2018-12-11 PROCEDURE — 85027 COMPLETE CBC AUTOMATED: CPT

## 2018-12-11 PROCEDURE — 304952 HCHG R 2 PADS

## 2018-12-11 PROCEDURE — 80048 BASIC METABOLIC PNL TOTAL CA: CPT

## 2018-12-11 PROCEDURE — 99152 MOD SED SAME PHYS/QHP 5/>YRS: CPT

## 2018-12-11 PROCEDURE — 700101 HCHG RX REV CODE 250

## 2018-12-11 PROCEDURE — 33284 HCHG CARDIAC LR REMOVAL: CPT

## 2018-12-11 PROCEDURE — 700111 HCHG RX REV CODE 636 W/ 250 OVERRIDE (IP)

## 2018-12-11 PROCEDURE — 93010 ELECTROCARDIOGRAM REPORT: CPT | Performed by: INTERNAL MEDICINE

## 2018-12-11 PROCEDURE — A9270 NON-COVERED ITEM OR SERVICE: HCPCS | Performed by: INTERNAL MEDICINE

## 2018-12-11 PROCEDURE — 700111 HCHG RX REV CODE 636 W/ 250 OVERRIDE (IP): Performed by: INTERNAL MEDICINE

## 2018-12-11 PROCEDURE — G0378 HOSPITAL OBSERVATION PER HR: HCPCS

## 2018-12-11 PROCEDURE — 33284 PR RMV CARD EVENT RECRD,PT-ACT: CPT | Mod: 51 | Performed by: INTERNAL MEDICINE

## 2018-12-11 PROCEDURE — 99225 PR SUBSEQUENT OBSERVATION CARE,LEVEL II: CPT | Performed by: HOSPITALIST

## 2018-12-11 PROCEDURE — 700102 HCHG RX REV CODE 250 W/ 637 OVERRIDE(OP): Performed by: INTERNAL MEDICINE

## 2018-12-11 PROCEDURE — 36415 COLL VENOUS BLD VENIPUNCTURE: CPT

## 2018-12-11 PROCEDURE — 33208 INSRT HEART PM ATRIAL & VENT: CPT | Performed by: INTERNAL MEDICINE

## 2018-12-11 PROCEDURE — C1898 LEAD, PMKR, OTHER THAN TRANS: HCPCS

## 2018-12-11 PROCEDURE — 99153 MOD SED SAME PHYS/QHP EA: CPT

## 2018-12-11 PROCEDURE — C1785 PMKR, DUAL, RATE-RESP: HCPCS

## 2018-12-11 PROCEDURE — 33208 INSRT HEART PM ATRIAL & VENT: CPT

## 2018-12-11 PROCEDURE — 99152 MOD SED SAME PHYS/QHP 5/>YRS: CPT | Performed by: INTERNAL MEDICINE

## 2018-12-11 PROCEDURE — 93005 ELECTROCARDIOGRAM TRACING: CPT | Performed by: INTERNAL MEDICINE

## 2018-12-11 PROCEDURE — 71045 X-RAY EXAM CHEST 1 VIEW: CPT

## 2018-12-11 PROCEDURE — 96372 THER/PROPH/DIAG INJ SC/IM: CPT

## 2018-12-11 PROCEDURE — 304853 HCHG PPM TEST CABLE

## 2018-12-11 PROCEDURE — C1892 INTRO/SHEATH,FIXED,PEEL-AWAY: HCPCS

## 2018-12-11 PROCEDURE — 305387 HCHG SUTURES

## 2018-12-11 PROCEDURE — C1894 INTRO/SHEATH, NON-LASER: HCPCS

## 2018-12-11 RX ORDER — MIDAZOLAM HYDROCHLORIDE 1 MG/ML
INJECTION INTRAMUSCULAR; INTRAVENOUS
Status: COMPLETED
Start: 2018-12-11 | End: 2018-12-11

## 2018-12-11 RX ORDER — CEFAZOLIN SODIUM 1 G/3ML
INJECTION, POWDER, FOR SOLUTION INTRAMUSCULAR; INTRAVENOUS
Status: COMPLETED
Start: 2018-12-11 | End: 2018-12-11

## 2018-12-11 RX ORDER — BUPIVACAINE HYDROCHLORIDE 2.5 MG/ML
INJECTION, SOLUTION EPIDURAL; INFILTRATION; INTRACAUDAL
Status: COMPLETED
Start: 2018-12-11 | End: 2018-12-11

## 2018-12-11 RX ORDER — LIDOCAINE HYDROCHLORIDE 20 MG/ML
INJECTION, SOLUTION INFILTRATION; PERINEURAL
Status: COMPLETED
Start: 2018-12-11 | End: 2018-12-11

## 2018-12-11 RX ADMIN — MIDAZOLAM HYDROCHLORIDE 4 MG: 1 INJECTION, SOLUTION INTRAMUSCULAR; INTRAVENOUS at 16:46

## 2018-12-11 RX ADMIN — FENTANYL CITRATE 100 MCG: 50 INJECTION, SOLUTION INTRAMUSCULAR; INTRAVENOUS at 16:46

## 2018-12-11 RX ADMIN — ENOXAPARIN SODIUM 40 MG: 100 INJECTION SUBCUTANEOUS at 06:12

## 2018-12-11 RX ADMIN — CEFAZOLIN 3000 MG: 330 INJECTION, POWDER, FOR SOLUTION INTRAMUSCULAR; INTRAVENOUS at 16:09

## 2018-12-11 RX ADMIN — LIDOCAINE HYDROCHLORIDE: 20 INJECTION, SOLUTION INFILTRATION; PERINEURAL at 16:45

## 2018-12-11 RX ADMIN — STANDARDIZED SENNA CONCENTRATE AND DOCUSATE SODIUM 2 TABLET: 8.6; 5 TABLET, FILM COATED ORAL at 17:38

## 2018-12-11 RX ADMIN — ACETAMINOPHEN 650 MG: 325 TABLET, FILM COATED ORAL at 23:40

## 2018-12-11 RX ADMIN — BUPIVACAINE HYDROCHLORIDE: 2.5 INJECTION, SOLUTION EPIDURAL; INFILTRATION; INTRACAUDAL at 16:45

## 2018-12-11 RX ADMIN — MIDAZOLAM HYDROCHLORIDE 2 MG: 1 INJECTION, SOLUTION INTRAMUSCULAR; INTRAVENOUS at 17:01

## 2018-12-11 ASSESSMENT — ENCOUNTER SYMPTOMS
HEMOPTYSIS: 0
MUSCULOSKELETAL NEGATIVE: 1
EYES NEGATIVE: 1
VOMITING: 0
ABDOMINAL PAIN: 0
CHILLS: 0
CONSTITUTIONAL NEGATIVE: 1
SEIZURES: 0
BLURRED VISION: 0
SORE THROAT: 0
LOSS OF CONSCIOUSNESS: 0
BRUISES/BLEEDS EASILY: 0
EYE PAIN: 0
INSOMNIA: 0
HEADACHES: 0
WEIGHT LOSS: 0
FALLS: 1
CONSTIPATION: 0
MYALGIAS: 0
EYE REDNESS: 0
COUGH: 0
BLOOD IN STOOL: 0
RESPIRATORY NEGATIVE: 1
DIAPHORESIS: 0
PSYCHIATRIC NEGATIVE: 1
GASTROINTESTINAL NEGATIVE: 1
NAUSEA: 0
TREMORS: 0
SHORTNESS OF BREATH: 0
NERVOUS/ANXIOUS: 0
DIZZINESS: 1
CARDIOVASCULAR NEGATIVE: 1
WHEEZING: 0
WEAKNESS: 0
DEPRESSION: 0
FEVER: 0
DIARRHEA: 0
PALPITATIONS: 0
FOCAL WEAKNESS: 0

## 2018-12-11 ASSESSMENT — PAIN SCALES - GENERAL
PAINLEVEL_OUTOF10: 0
PAINLEVEL_OUTOF10: 4
PAINLEVEL_OUTOF10: 0
PAINLEVEL_OUTOF10: 0

## 2018-12-11 ASSESSMENT — LIFESTYLE VARIABLES
EVER_SMOKED: YES
SUBSTANCE_ABUSE: 0

## 2018-12-11 NOTE — PROGRESS NOTES
[facility] Medicine Daily Progress Note    Date of Service  12/11/2018    Chief Complaint  58 y.o. male admitted 12/10/2018 with sinus pauses    Hospital Course   Patient is a pleasant 58 year old gentleman with history of  intermittent lightheadedness, pauses noted on outpatient loop recorder.     Interval Problem Update  He denies any dizziness since admission but has been resting  No cp, no sob  Ros otherwise negative    Consultants/Specialty  Cardiology    Code Status  Full     Disposition  Awaiting cath    Review of Systems  Review of Systems   Constitutional: Negative.  Negative for chills, diaphoresis, fever, malaise/fatigue and weight loss.   HENT: Negative.  Negative for sore throat.    Eyes: Negative.  Negative for blurred vision.   Respiratory: Negative.  Negative for cough and shortness of breath.    Cardiovascular: Negative.  Negative for chest pain, palpitations and leg swelling.   Gastrointestinal: Negative.  Negative for abdominal pain, nausea and vomiting.   Genitourinary: Negative.  Negative for dysuria.   Musculoskeletal: Negative.  Negative for myalgias.   Skin: Negative.  Negative for itching and rash.   Neurological: Positive for dizziness (prior to admission with exertion). Negative for focal weakness, weakness and headaches.   Endo/Heme/Allergies: Negative.  Does not bruise/bleed easily.   Psychiatric/Behavioral: Negative.  Negative for depression, substance abuse and suicidal ideas.   All other systems reviewed and are negative.       Physical Exam  Temp:  [36.2 °C (97.1 °F)-37 °C (98.6 °F)] 36.5 °C (97.7 °F)  Pulse:  [51-71] 51  Resp:  [18-20] 18  BP: (113-144)/(62-96) 144/94    Physical Exam   Constitutional: He is oriented to person, place, and time. He appears well-developed and well-nourished. No distress.   HENT:   Head: Normocephalic and atraumatic.   Mouth/Throat: Oropharynx is clear and moist.   Eyes: Conjunctivae are normal. Right eye exhibits no discharge.   Cardiovascular: Normal  rate, regular rhythm, normal heart sounds and intact distal pulses.  Exam reveals no gallop and no friction rub.    No murmur heard.  Pulmonary/Chest: Effort normal and breath sounds normal. No respiratory distress. He has no wheezes. He has no rales. He exhibits no tenderness.   Abdominal: Soft. Bowel sounds are normal. He exhibits no distension and no mass. There is no tenderness. There is no rebound and no guarding.   Musculoskeletal: Normal range of motion. He exhibits no edema, tenderness or deformity.   Neurological: He is alert and oriented to person, place, and time. He has normal reflexes. No cranial nerve deficit. He exhibits normal muscle tone. Coordination normal.   Skin: Skin is warm and dry. No rash noted. He is not diaphoretic. No erythema. No pallor.   Psychiatric: He has a normal mood and affect. His behavior is normal. Judgment and thought content normal.   Nursing note and vitals reviewed.      Fluids    Intake/Output Summary (Last 24 hours) at 12/11/18 1440  Last data filed at 12/11/18 1300   Gross per 24 hour   Intake              240 ml   Output                0 ml   Net              240 ml       Laboratory  Recent Labs      12/10/18   1437  12/11/18   0303   WBC  5.9  5.1   RBC  5.32  5.19   HEMOGLOBIN  15.3  14.2   HEMATOCRIT  44.5  44.1   MCV  83.6  85.0   MCH  28.8  27.4   MCHC  34.4  32.2*   RDW  38.4  38.8   PLATELETCT  183  170   MPV  10.6  10.3     Recent Labs      12/10/18   1437  12/11/18   0303   SODIUM  141  141   POTASSIUM  3.7  4.0   CHLORIDE  109  108   CO2  22  23   GLUCOSE  115*  90   BUN  20  18   CREATININE  0.78  0.69   CALCIUM  9.1  9.0     Recent Labs      12/10/18   1437   APTT  30.4   INR  1.20*     Recent Labs      12/10/18   1437   BNPBTYPENAT  35           Imaging  DX-CHEST-LIMITED (1 VIEW)   Final Result         No acute cardiac or pulmonary abnormality is identified.           Assessment/Plan  * Sinus pause   Assessment & Plan    Dr. Brown consulted  Pacemaker  planned in the AM     Dyslipidemia- (present on admission)   Assessment & Plan    Lipid panel ordered  Pharmacy tech to complete med rec. Not done.     History of heart valve replacement with bioprosthetic valve [Z95.4]- (present on admission)   Assessment & Plan    Follow up with Cardiology     History of thoracic aortic aneurysm without rupture (HCC)   Assessment & Plan    Noted.     Sleep apnea- (present on admission)   Assessment & Plan    Nocturnal oximetry  Sleep study in the outpatient          VTE prophylaxis: lovenox

## 2018-12-11 NOTE — PROGRESS NOTES
Bedside report completed. Patient alert and oriented. Updated on plan of care. NPO since midnight. Call light within reach. Patient educated to call for help when out of bed due to dizziness. Telemetry box on. Denies pain.

## 2018-12-11 NOTE — PROGRESS NOTES
Chart reviewed for AMI and HF quality measures. Neither diagnosis applicable at this time, patient is being followed by cardiology for sinus pause.     Cardiovascular Nurse Navigator will not follow.     Many thanks and please call GLADIS Mahmood, [facility] at [phone] with questions M-F
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
## 2018-12-11 NOTE — CARE PLAN
Problem: Safety  Goal: Will remain free from injury  Outcome: PROGRESSING AS EXPECTED  Patient educated on fall risk precautions and hospital safety. Patient educated to call for help for out of bed assistance due to dizziness. Patient reports understanding.     Problem: Knowledge Deficit  Goal: Knowledge of disease process/condition, treatment plan, diagnostic tests, and medications will improve  Outcome: PROGRESSING AS EXPECTED  Education provided regarding what to expect after pacemaker is placed. Continue to reinforce education.

## 2018-12-11 NOTE — PROGRESS NOTES
Bedside report received from Giorgio GRIFFITH, patient transported via gurney on monitor. Vital signs stable.

## 2018-12-11 NOTE — RESPIRATORY CARE
COPD EDUCATION by COPD CLINICAL EDUCATOR  12/11/2018 at 6:47 AM by Nora Mosley     Patient reviewed by COPD education team. Patient does not qualify for COPD program.

## 2018-12-11 NOTE — CARE PLAN
Problem: Communication  Goal: The ability to communicate needs accurately and effectively will improve    Intervention: [place] patient and significant other/support system to call light to alert staff of needs  Educate patient on importance of calling for assistance and use of call light. Reorient patient throughout shift and reassure. Provide comfort measures and ensure environment is calm and conducive to rest.       Problem: Safety  Goal: Will remain free from injury    Intervention: Provide assistance with mobility  Educate patient on importance of calling RN when in need of the bathroom and when leaving bed. Assess gait and mobility status throughout shift.

## 2018-12-11 NOTE — DISCHARGE PLANNING
Care Transition Team Assessment    Information Source  Orientation : Oriented x 4  Information Given By: Patient  Who is responsible for making decisions for patient? : Patient    Readmission Evaluation  Is this a readmission?: No    Elopement Risk  Legal Hold: No  Ambulatory or Self Mobile in Wheelchair: Yes  Disoriented: No  Psychiatric Symptoms: None  History of Wandering: No  Elopement this Admit: No  Vocalizing Wanting to Leave: No  Displays Behaviors, Body Language Wanting to Leave: No-Not at Risk for Elopement  Elopement Risk: Not at Risk for Elopement    Interdisciplinary Discharge Planning  Patient or legal guardian wants to designate a caregiver (see row info): No    Discharge Preparedness  What is your plan after discharge?: Home with help  What are your discharge supports?: Child  Prior Functional Level: Ambulatory, Drives Self, Independent with Activities of Daily Living, Independent with Medication Management  Difficulity with ADLs: None  Difficulity with IADLs: None    Functional Assesment  Prior Functional Level: Ambulatory, Drives Self, Independent with Activities of Daily Living, Independent with Medication Management    Finances  Financial Barriers to Discharge: No  Prescription Coverage: Yes    Vision / Hearing Impairment  Vision Impairment : Yes  Right Eye Vision: Wears Glasses  Left Eye Vision: Wears Glasses  Hearing Impairment : No    Values / Beliefs / Concerns  Values / Beliefs Concerns : No  Special Hospitalization Concerns:  (n/a)    Advance Directive  Advance Directive?: None  Advance Directive offered?: AD Booklet refused    Domestic Abuse  Have you ever been the victim of abuse or violence?: No  Physical Abuse or Sexual Abuse: No  Verbal Abuse or Emotional Abuse: No  Possible Abuse Reported to:: Not Applicable    Psychological Assessment  History of Substance Abuse: None  History of Psychiatric Problems: No    Discharge Risks or Barriers  Discharge risks or barriers?: No  PCP    Anticipated Discharge Information  Anticipated discharge disposition: Home

## 2018-12-11 NOTE — PROGRESS NOTES
2 RN skin check complete. Patient ambulates frequently and repositions self. No signs of skin integrity issues as this patient appears to be healthy. Education provided on importance of frequently ambulating and repositioning. Will continue to monitor.

## 2018-12-11 NOTE — PROGRESS NOTES
Patient scheduled for PPM today for hx of pauses.    He has been NPO.  Lovenox 40 mg SQ given at 6 am- cath lab aware and will discuss with Dr. Spencer.   Labs: Stable, WBC 5.1, Plt 170, BNP 35.   The risks, benefits, and alternatives to permanent pacemaker placement were discussed in great detail.  Specific risks mentioned to the patient including bleeding, cardiac perforation with possible tamponade possibly requiring pericardiocentesis or open heart surgery.  In addition the possibility of lead dislodgment (2-3%), pneumothorax (3%), hemothorax, infection were discussed.  Also, mentioned were the risk of death, stroke, and myocardial infarction.  The patient verbalized understanding of the potential complications and wishes to proceed with the procedure.

## 2018-12-12 ENCOUNTER — APPOINTMENT (OUTPATIENT)
Dept: RADIOLOGY | Facility: MEDICAL CENTER | Age: 58
End: 2018-12-12
Attending: INTERNAL MEDICINE

## 2018-12-12 VITALS
HEART RATE: 61 BPM | SYSTOLIC BLOOD PRESSURE: 109 MMHG | DIASTOLIC BLOOD PRESSURE: 69 MMHG | TEMPERATURE: 97.8 F | WEIGHT: 220.9 LBS | RESPIRATION RATE: 18 BRPM | OXYGEN SATURATION: 93 % | BODY MASS INDEX: 31.62 KG/M2 | HEIGHT: 70 IN

## 2018-12-12 PROBLEM — I45.5 SINUS PAUSE: Status: RESOLVED | Noted: 2018-12-10 | Resolved: 2018-12-12

## 2018-12-12 LAB
CHOLEST SERPL-MCNC: 120 MG/DL (ref 100–199)
EKG IMPRESSION: NORMAL
HDLC SERPL-MCNC: 21 MG/DL
LDLC SERPL CALC-MCNC: 72 MG/DL
TRIGL SERPL-MCNC: 137 MG/DL (ref 0–149)

## 2018-12-12 PROCEDURE — 71045 X-RAY EXAM CHEST 1 VIEW: CPT

## 2018-12-12 PROCEDURE — 96372 THER/PROPH/DIAG INJ SC/IM: CPT

## 2018-12-12 PROCEDURE — G0378 HOSPITAL OBSERVATION PER HR: HCPCS

## 2018-12-12 PROCEDURE — 36415 COLL VENOUS BLD VENIPUNCTURE: CPT

## 2018-12-12 PROCEDURE — 93005 ELECTROCARDIOGRAM TRACING: CPT | Performed by: INTERNAL MEDICINE

## 2018-12-12 PROCEDURE — 99024 POSTOP FOLLOW-UP VISIT: CPT | Performed by: NURSE PRACTITIONER

## 2018-12-12 PROCEDURE — 80061 LIPID PANEL: CPT

## 2018-12-12 PROCEDURE — 700111 HCHG RX REV CODE 636 W/ 250 OVERRIDE (IP): Performed by: INTERNAL MEDICINE

## 2018-12-12 PROCEDURE — 99217 PR OBSERVATION CARE DISCHARGE: CPT | Performed by: HOSPITALIST

## 2018-12-12 PROCEDURE — 93010 ELECTROCARDIOGRAM REPORT: CPT | Performed by: INTERNAL MEDICINE

## 2018-12-12 RX ADMIN — ENOXAPARIN SODIUM 40 MG: 100 INJECTION SUBCUTANEOUS at 05:01

## 2018-12-12 ASSESSMENT — ENCOUNTER SYMPTOMS
TROUBLE SWALLOWING: 0
SHORTNESS OF BREATH: 0
CHILLS: 0
HEMATOLOGIC/LYMPHATIC NEGATIVE: 1
EYES NEGATIVE: 1
WEAKNESS: 0
DIZZINESS: 0
BLOOD IN STOOL: 0
NUMBNESS: 0
PALPITATIONS: 0
ENDOCRINE NEGATIVE: 1
WHEEZING: 0
FATIGUE: 0
FEVER: 0
LIGHT-HEADEDNESS: 0
SPEECH DIFFICULTY: 0
COUGH: 0
PSYCHIATRIC NEGATIVE: 1
ABDOMINAL PAIN: 0

## 2018-12-12 ASSESSMENT — PAIN SCALES - GENERAL
PAINLEVEL_OUTOF10: 0
PAINLEVEL_OUTOF10: 0

## 2018-12-12 NOTE — PROGRESS NOTES
DISCHARGE:    Patient discharged to home with friend. Escorted via wheelchair to personal vehicle. Patient reports having all personal belongings at time of discharge. Patient alert and oriented at time of discharge. Instructions reviewed with patient including care of pacemaker and required precautions, patient states understanding. IV removed at time of discharge, telemetry box removed and returned to monitor room. Denies pain, no medications, copy of discharge instructions sent with patient, follow up appointments discussed, patient reports ability to attend.

## 2018-12-12 NOTE — PROGRESS NOTES
Monitor summary:    SB-SR 51-60  Brian down to 44, unsustained  .18/.10/.42      Since returning from pacemaker placement in cath lab, patient is now 100% paced at 60.
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
## 2018-12-12 NOTE — DISCHARGE SUMMARY
Discharge Summary    CHIEF COMPLAINT ON ADMISSION  Chief Complaint   Patient presents with   • Lightheadedness   • Tired   • Near Syncopal       Reason for Admission  Dizziness     Admission Date  12/10/2018    CODE STATUS  Full Code    HPI & HOSPITAL COURSE  Patient is a pleasant 58 year old gentleman with history of  intermittent lightheadedness and sinus pauses noted on outpatient loop recorder.  He was admitted for a pace maker implantation.  He tolerated the procedure well and the device is working.  He has no complaints at time of discharge.  He agrees to follow up with pulmonology for a sleep study and with cardiology.      Therefore, he is discharged in fair and stable condition to home with close outpatient follow-up.    The patient met 2-midnight criteria for an inpatient stay at the time of discharge.    Discharge Date  12/12/18    FOLLOW UP ITEMS POST DISCHARGE  EP clinic  Sleep study    DISCHARGE DIAGNOSES  Principal Problem (Resolved):    Sinus pause POA: Unknown  Active Problems:    History of heart valve replacement with bioprosthetic valve [Z95.4] POA: Yes      Overview: March 2017: AVR (27mm Dior Intuity pericardial valve), redo AVR (27mm       Dior Perimount Magna pericardial valve) and aortic ascending aneurysm       repair (34mm Hemashield tube graft) by Dr. White.      April 2018: Echocardiogram with normal LV size, mild concentric LVH, LVEF       60%. Normal function of AVR (peak 23mmHg, mean 13mmHg, Vmax 2.4m/s)..    Dyslipidemia POA: Yes    Sleep apnea POA: Yes      FOLLOW UP  Future Appointments  Date Time Provider Department Center   12/18/2018 3:00 PM [facility]   1/9/2019 2:45 PM Ana Cassidy M.D. [facility] None   1/16/2019 11:00 AM Tony Spencer M.D. [facility] None   4/24/2019 8:20 AM ZOEY Spivey [facility] None   4/24/2019 9:15 AM Brain Del Cid M.D. [facility] None     [facility]  [address] [place]  [id number]  [phone]    In 1 month        MEDICATIONS ON DISCHARGE     Medication List      You have not been prescribed any medications.         Allergies  No Known Allergies    DIET  Orders Placed This Encounter   Procedures   • Diet Order Cardiac     Standing Status:   Standing     Number of Occurrences:   1     Order Specific Question:   Diet:     Answer:   Cardiac [6]       ACTIVITY  Instructions per cardiololgy    CONSULTATIONS  cardiology    PROCEDURES  DX-CHEST-PORTABLE (1 VIEW)   Final Result         No appreciable pneumothorax supported left pacemaker placement.      DX-CHEST-PORTABLE (1 VIEW)   Final Result      1.  Interval placement of LEFT chest pacemaker and removal of loop recorder.   2.  No pneumothorax.      DX-CHEST-LIMITED (1 VIEW)   Final Result         No acute cardiac or pulmonary abnormality is identified.            LABORATORY  Lab Results   Component Value Date    SODIUM 141 12/11/2018    POTASSIUM 4.0 12/11/2018    CHLORIDE 108 12/11/2018    CO2 23 12/11/2018    GLUCOSE 90 12/11/2018    BUN 18 12/11/2018    CREATININE 0.69 12/11/2018        Lab Results   Component Value Date    WBC 5.1 12/11/2018    HEMOGLOBIN 14.2 12/11/2018    HEMATOCRIT 44.1 12/11/2018    PLATELETCT 170 12/11/2018         Total time of the discharge process exceeds 30 minutes.

## 2018-12-12 NOTE — PROGRESS NOTES
Patient returned from cath lab. Placed back on telemetry. Visitor at bedside. Post procedure vital signs started. Patient alert and oriented, denies pain. Dressing to left chest, left arm in sling. Patient educated not to mobilize left arm, reports understanding. Passed bedside swallow evaluation, call to kitchen to order dinner tray.

## 2018-12-12 NOTE — PROGRESS NOTES
Patient transported to cath lab with patient transport staff and RN. Patient taken in bed on zoll, monitor room notified. Alert and oriented at time of transfer, denies pain. Patient has been NPO since midnight for the procedure.

## 2018-12-12 NOTE — DISCHARGE INSTRUCTIONS
Discharge Instructions    Discharged to home by car with relative. Discharged via wheelchair, hospital escort: Yes.  Special equipment needed: Not Applicable    Be sure to schedule a follow-up appointment with your primary care doctor or any specialists as instructed.     Discharge Plan:   Diet Plan: Discussed  Activity Level: Discussed  Confirmed Follow up Appointment: Appointment Scheduled  Confirmed Symptoms Management: Discussed  Medication Reconciliation Updated: Yes  Influenza Vaccine Indication: Not indicated: Previously immunized this influenza season and > 8 years of age    I understand that a diet low in cholesterol, fat, and sodium is recommended for good health. Unless I have been given specific instructions below for another diet, I accept this instruction as my diet prescription.   Other diet: cardiac diet as tolerated    Special Instructions:  and None    · Is patient discharged on Warfarin / Coumadin?   No     Depression / Suicide Risk    As you are discharged from this [facility] facility, it is important to learn how to keep safe from harming yourself.    Recognize the warning signs:  · Abrupt changes in personality, positive or negative- including increase in energy   · Giving away possessions  · Change in eating patterns- significant weight changes-  positive or negative  · Change in sleeping patterns- unable to sleep or sleeping all the time   · Unwillingness or inability to communicate  · Depression  · Unusual sadness, discouragement and loneliness  · Talk of wanting to die  · Neglect of personal appearance   · Rebelliousness- reckless behavior  · Withdrawal from people/activities they love  · Confusion- inability to concentrate     If you or a loved one observes any of these behaviors or has concerns about self-harm, here's what you can do:  · Talk about it- your feelings and reasons for harming yourself  · Remove any means that you might use to hurt yourself (examples: pills, rope,  extension cords, firearm)  · Get professional help from the community (Mental Health, Substance Abuse, psychological counseling)  · Do not be alone:Call your Safe Contact- someone whom you trust who will be there for you.  · Call your local CRISIS HOTLINE 081-9148 or 233-023-3530  · Call your local Children's Mobile Crisis Response Team [place] (608) 900-3668 or www.[employer]  · Call the toll free National Suicide Prevention Hotlines   · National Suicide Prevention Lifeline 612-683-MJNL (4250)  · [place] Line Network 800-SUICIDE (166-5968)            Pacer restrictions:  Do not raise affected arm above head or behind back for six weeks. May remove arm sling after 24 hrs, please wear if trouble remembering arm limitation and prn at night. No heavy lifting/pushing for six weeks. No driving for first week. No showers until seen in follow up. Keep dressing clean and dry until sees us in follow up. Wound care reviewed. Instructed to report s/s of infection such as warmth/redness/drainage/swelling at site or fever/chills.

## 2018-12-12 NOTE — PROGRESS NOTES
Bedside report completed. Patient sitting on side of bed. Call light in reach, telemetry box on. Dressing to left chest from pacemaker placement yesterday. Denies pain. Updated on plan of care.

## 2018-12-12 NOTE — OP REPORT
[facility] PROCEDURE NOTE    PROCEDURE PERFORMED:   1) Permanent Pacemaker Implantation  2) Implantable loop recorder explant    DATE OF SERVICE: 12/11/2018    : Tony Spencer MD    ASSISTANT: None    ANESTHESIA: Local and moderate sedation (start time 1609, stop time 1702, total dose 6 mg IV versed, 100 mcg IV fentanyl)    EBL: 30 cc    SPECIMENS: None    INDICATION(S):  Sick sinus syndrome  Syncope    DESCRIPTION OF PROCEDURE:  After informed written consent, the patient was brought to the electrophysiology lab in the fasting, unsedated state. The patient was prepped and draped in the usual sterile fashion. The procedure was performed under moderate sedation with local anesthetic. A left upper extremity venogram was performed, demonstrating a patent subclavian vein. A left infraclavicular incision was made with a scalpel and the pectoral device pocket was created using a combination of blunt dissection and electrocautery. The modified Seldinger technique was used to gain access to the left axillary vein. A peel-away hemostasis sheath was placed in the vein. Under fluoroscopic guidance, the pacemaker leads were introduced into the heart. The ventricular lead was advanced to the RVOT and then lowered into position at the RV apex. The atrial lead was positioned on R atrial appendage. The leads were tested and had satisfactory sensing and pacing parameters. High output ventricular pacing did not produce extracardiac stimulation. The leads were sutured to the underlying pectoral muscle with interrupted silk over a silastic suture sleeve. The device pocket was irrigated with antibiotic solution, inspected, and no bleeding was seen. The leads were connected to the pacemaker pulse generator and the device was inserted into the pocket. The wound was closed with three layers of absorbable sutures. The incision for the loop recorder was anesthestized with lidocaine, a small incision made, and device removed  with a hemostat. Following recovery from sedation, the patient was transferred to a monitored bed in good condition.     IMPLANTED DEVICE INFORMATION:  Pulse generator is a MDT model W3DR01  Serial # [id number]    EXPLANTED DEVICE INFORMATION:  MDT LNQ11  Serial number: [id number]     LEAD INFORMATION:  1)Right atrial lead is a MDT model #5076-52 , serial #[id number], P wave 2.4 millivolts, threshold 0.2 Volts at 0.5 milliseconds, pacing impedance 913 Ohms.    2)Right ventricular lead is a MDT model #5076-58, serial #[id number], R wave 4.7 millivolts, threshold 0.2 Volts at 0.5 milliseconds, pacing impedance 957 Ohms.    DEVICE PROGRAMMING:  AAIR <-> DDDR 60 -130 ppm    FLUOROSCOPY TIME: 4:46    COMPLICATION(S): None    IMPRESSIONS:  1. Successful dual chamber pacemaker implantation    RECOMMENDATIONS:  1. Transfer to monitored bed  2. Chest x-ray  3. Device interrogation prior to hospital discharge  4. Followup in device clinic

## 2018-12-13 ENCOUNTER — PATIENT OUTREACH (OUTPATIENT)
Dept: HEALTH INFORMATION MANAGEMENT | Facility: OTHER | Age: 58
End: 2018-12-13

## 2018-12-18 ENCOUNTER — NON-PROVIDER VISIT (OUTPATIENT)
Dept: CARDIOLOGY | Facility: MEDICAL CENTER | Age: 58
End: 2018-12-18

## 2018-12-18 VITALS
HEIGHT: 70 IN | HEART RATE: 78 BPM | DIASTOLIC BLOOD PRESSURE: 78 MMHG | SYSTOLIC BLOOD PRESSURE: 120 MMHG | OXYGEN SATURATION: 93 % | BODY MASS INDEX: 31.5 KG/M2 | WEIGHT: 220 LBS

## 2018-12-18 DIAGNOSIS — I49.5 SICK SINUS SYNDROME (HCC): ICD-10-CM

## 2018-12-18 DIAGNOSIS — Z95.0 CARDIAC PACEMAKER IN SITU: ICD-10-CM

## 2018-12-18 PROBLEM — Z95.818 STATUS POST PLACEMENT OF IMPLANTABLE LOOP RECORDER: Status: RESOLVED | Noted: 2018-04-24 | Resolved: 2018-12-18

## 2018-12-18 PROCEDURE — 93280 PM DEVICE PROGR EVAL DUAL: CPT | Performed by: NURSE PRACTITIONER

## 2018-12-18 NOTE — PROGRESS NOTES
Patient had ILR implanted in April 2018. Interrogation in October 2018 showed 26 pauses, including 2 that lasted >8 seconds at nighttime.  He was referred for PM implantation, which was done on 12/11/2018.    He is here today for wound check and device interrogation.    Device is working normally. No mode switching episodes.  Normal sensing and capture of RA and RV leads; stable impedances. Battery longevity is 14 years.  No changes are made today.    Incision is well healed with no redness, swelling or drainage.    He is reminded about limited ROM of left arm, and given general wound care instructions as well as device information and anticipatory guidance.    FU in 1 months for next PM check with me for final threshold checks.    Collaborating MD: Steve

## 2019-01-02 NOTE — TELEPHONE ENCOUNTER
Patient referred to vascular care status post bioprosthetic aVR and ascending aortic aneurysm repair.  He missed appointment to establish care with us in vascular medicine clinic today.  He does appear to be following up regularly with cardiology and has surveillance imaging scheduled    He has completed 3 months of oral anticoagulation.  We will ask clinical pharmacist to call and make sure patient is off warfarin and taking low-dose aspirin    Pending any further patient contact or other recs from his treatment team, we will defer all medical management to cardiology, but continue to partner in in ensuring surveillance imaging per institution protocol    Michael J. Bloch, MD  Vascular Care    CC:  KEREN Salcedo   Detail Level: Zone Detail Level: Generalized

## 2019-01-31 LAB — EKG IMPRESSION: NORMAL

## 2019-02-27 ENCOUNTER — TELEPHONE (OUTPATIENT)
Dept: VASCULAR LAB | Facility: MEDICAL CENTER | Age: 59
End: 2019-02-27

## 2019-02-27 DIAGNOSIS — I71.20 THORACIC AORTIC ANEURYSM WITHOUT RUPTURE (HCC): ICD-10-CM

## 2019-02-27 DIAGNOSIS — Z95.3 HISTORY OF HEART VALVE REPLACEMENT WITH BIOPROSTHETIC VALVE: ICD-10-CM

## 2019-02-27 DIAGNOSIS — I35.0 SEVERE AORTIC STENOSIS: ICD-10-CM

## 2019-04-09 ENCOUNTER — TELEPHONE (OUTPATIENT)
Dept: VASCULAR LAB | Facility: MEDICAL CENTER | Age: 59
End: 2019-04-09

## 2019-04-09 NOTE — TELEPHONE ENCOUNTER
Called pt to remind that the  echo  is due for surveillance. Scheduling office and our office phone numbers provided. AMANUEL Cai.

## 2019-04-18 ENCOUNTER — APPOINTMENT (OUTPATIENT)
Dept: CARDIOLOGY | Facility: MEDICAL CENTER | Age: 59
End: 2019-04-18
Attending: NURSE PRACTITIONER

## 2019-04-24 ENCOUNTER — NON-PROVIDER VISIT (OUTPATIENT)
Dept: CARDIOLOGY | Facility: PHYSICIAN GROUP | Age: 59
End: 2019-04-24

## 2019-04-24 ENCOUNTER — OFFICE VISIT (OUTPATIENT)
Dept: CARDIOLOGY | Facility: PHYSICIAN GROUP | Age: 59
End: 2019-04-24

## 2019-04-24 VITALS
OXYGEN SATURATION: 94 % | DIASTOLIC BLOOD PRESSURE: 80 MMHG | SYSTOLIC BLOOD PRESSURE: 138 MMHG | BODY MASS INDEX: 31.64 KG/M2 | HEART RATE: 72 BPM | HEIGHT: 70 IN | WEIGHT: 221 LBS

## 2019-04-24 VITALS
HEIGHT: 70 IN | HEART RATE: 72 BPM | DIASTOLIC BLOOD PRESSURE: 80 MMHG | WEIGHT: 221 LBS | BODY MASS INDEX: 31.64 KG/M2 | OXYGEN SATURATION: 94 % | SYSTOLIC BLOOD PRESSURE: 138 MMHG

## 2019-04-24 DIAGNOSIS — E78.5 DYSLIPIDEMIA: ICD-10-CM

## 2019-04-24 DIAGNOSIS — I25.83 CORONARY ARTERY DISEASE DUE TO LIPID RICH PLAQUE: ICD-10-CM

## 2019-04-24 DIAGNOSIS — Z95.0 CARDIAC PACEMAKER IN SITU: ICD-10-CM

## 2019-04-24 DIAGNOSIS — I49.5 SICK SINUS SYNDROME (HCC): ICD-10-CM

## 2019-04-24 DIAGNOSIS — I25.10 CORONARY ARTERY DISEASE DUE TO LIPID RICH PLAQUE: ICD-10-CM

## 2019-04-24 DIAGNOSIS — Z95.3 HISTORY OF HEART VALVE REPLACEMENT WITH BIOPROSTHETIC VALVE: ICD-10-CM

## 2019-04-24 PROCEDURE — 93280 PM DEVICE PROGR EVAL DUAL: CPT | Performed by: NURSE PRACTITIONER

## 2019-04-24 PROCEDURE — 99214 OFFICE O/P EST MOD 30 MIN: CPT | Mod: 25 | Performed by: INTERNAL MEDICINE

## 2019-04-24 ASSESSMENT — ENCOUNTER SYMPTOMS
DEPRESSION: 1
LOSS OF CONSCIOUSNESS: 1
BLURRED VISION: 1
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
## 2019-04-24 NOTE — PROGRESS NOTES
Device is working normally. No mode switching episodes.  Normal sensing and capture of RA and RV leads; stable impedances. Battery longevity is 14.5 years.  No changes are made today.    He does see Dr. ANSON Del Cid today too.    FU in 6 months for next PM check with me.    Collaborating MD: ANSON Del Cid

## 2019-04-30 ENCOUNTER — TELEPHONE (OUTPATIENT)
Dept: VASCULAR LAB | Facility: MEDICAL CENTER | Age: 59
End: 2019-04-30

## 2019-04-30 NOTE — TELEPHONE ENCOUNTER
Called pt to remind that the echo  is due for surveillance. Scheduling office and our office phone numbers provided. Pt hung up on me when I called.  AMANUEL Cai.

## 2019-05-15 ENCOUNTER — TELEPHONE (OUTPATIENT)
Dept: VASCULAR LAB | Facility: MEDICAL CENTER | Age: 59
End: 2019-05-15

## 2019-06-04 ENCOUNTER — TELEPHONE (OUTPATIENT)
Dept: VASCULAR LAB | Facility: MEDICAL CENTER | Age: 59
End: 2019-06-04

## 2019-06-06 ENCOUNTER — TELEPHONE (OUTPATIENT)
Dept: MEDICAL GROUP | Facility: PHYSICIAN GROUP | Age: 59
End: 2019-06-06

## 2019-06-06 NOTE — TELEPHONE ENCOUNTER
"Alejandra called stating she was told that the patient is now back at the [place] office and is \"making a scene\" and demanding that he will not leave the office until he has the DMV form completed.     Unfortunately there is no mention in Dr. Del Cid's notes that he has cleared him to drive. However, Dr. Del Cid's note from 4/24 does state that since PPM implantation:   \"He has no more syncope now that the device is in place.\"    To Lety GILLIS  "

## 2019-06-06 NOTE — TELEPHONE ENCOUNTER
Completed form has been scanned into chart.     Called pt and he will  the form from the [place] office and take to the DMV himself. He was appreciative and apologetic.    S/w Petra and she will have printed and ready.

## 2019-06-06 NOTE — TELEPHONE ENCOUNTER
I went ahead and completed the DMV forms for him to be cleared. Where do I need [facility] to fax these to?  Thanks, AB

## 2019-06-06 NOTE — TELEPHONE ENCOUNTER
Pt came into [facility] with a DMV form that needs to be filled out in order for him to get his license back. He stated that the last time that he saw Dr. Del Cid he said he was okay to drive. He was last seen on 4/24/19 and is aware that Dr. Del Cid is no longer with [facility]. He would like the form faxed to the DMV and would also like to  a physical copy of it also. He stated that he really needs it today so he can take it to DMV. The paper is scanned into his chart in media. If you have any questions you can reach out to [facility] at [phone] or call pt.   Thank you

## 2019-06-11 ENCOUNTER — TELEPHONE (OUTPATIENT)
Dept: VASCULAR LAB | Facility: MEDICAL CENTER | Age: 59
End: 2019-06-11

## 2019-06-11 NOTE — TELEPHONE ENCOUNTER
Called pt to remind that the echo  is due for surveillance. Keeps hanging up on me. Certified letter sent to patient explaining that multiple calls have been made in attempts to contact them regarding their vascular surveillance studies. Requested patient call clinic to schedule test as failure to obtain these studies in a timely fashion can lead to significant medical consequences. Phone number to clinic provided in correspondence. We will wait for the patient to respond for any future correspondence.   AMANUEL Cai.     .

## 2020-02-06 ENCOUNTER — APPOINTMENT (OUTPATIENT)
Dept: RADIOLOGY | Facility: MEDICAL CENTER | Age: 60
End: 2020-02-06
Attending: INTERNAL MEDICINE
Payer: OTHER MISCELLANEOUS

## 2020-02-06 ENCOUNTER — HOSPITAL ENCOUNTER (OUTPATIENT)
Facility: MEDICAL CENTER | Age: 60
End: 2020-02-10
Attending: EMERGENCY MEDICINE | Admitting: INTERNAL MEDICINE
Payer: OTHER MISCELLANEOUS
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
## 2020-02-06 DIAGNOSIS — R55 SYNCOPE, UNSPECIFIED SYNCOPE TYPE: ICD-10-CM

## 2020-02-06 PROBLEM — E66.9 CLASS 1 OBESITY IN ADULT: Status: ACTIVE | Noted: 2020-02-06

## 2020-02-06 LAB
ALBUMIN SERPL BCP-MCNC: 4.7 G/DL (ref 3.2–4.9)
ALBUMIN/GLOB SERPL: 1.9 G/DL
ALP SERPL-CCNC: 77 U/L (ref 30–99)
ALT SERPL-CCNC: 28 U/L (ref 2–50)
ANION GAP SERPL CALC-SCNC: 13 MMOL/L (ref 0–11.9)
AST SERPL-CCNC: 20 U/L (ref 12–45)
BASOPHILS # BLD AUTO: 0.4 % (ref 0–1.8)
BASOPHILS # BLD: 0.03 K/UL (ref 0–0.12)
BILIRUB SERPL-MCNC: 0.7 MG/DL (ref 0.1–1.5)
BUN SERPL-MCNC: 28 MG/DL (ref 8–22)
CALCIUM SERPL-MCNC: 8.9 MG/DL (ref 8.4–10.2)
CHLORIDE SERPL-SCNC: 105 MMOL/L (ref 96–112)
CO2 SERPL-SCNC: 23 MMOL/L (ref 20–33)
CREAT SERPL-MCNC: 0.75 MG/DL (ref 0.5–1.4)
EKG IMPRESSION: NORMAL
EOSINOPHIL # BLD AUTO: 0.1 K/UL (ref 0–0.51)
EOSINOPHIL NFR BLD: 1.4 % (ref 0–6.9)
ERYTHROCYTE [DISTWIDTH] IN BLOOD BY AUTOMATED COUNT: 36.8 FL (ref 35.9–50)
GLOBULIN SER CALC-MCNC: 2.5 G/DL (ref 1.9–3.5)
GLUCOSE SERPL-MCNC: 104 MG/DL (ref 65–99)
HCT VFR BLD AUTO: 46.6 % (ref 42–52)
HGB BLD-MCNC: 15.7 G/DL (ref 14–18)
IMM GRANULOCYTES # BLD AUTO: 0.02 K/UL (ref 0–0.11)
IMM GRANULOCYTES NFR BLD AUTO: 0.3 % (ref 0–0.9)
LYMPHOCYTES # BLD AUTO: 1.68 K/UL (ref 1–4.8)
LYMPHOCYTES NFR BLD: 23.8 % (ref 22–41)
MCH RBC QN AUTO: 28.6 PG (ref 27–33)
MCHC RBC AUTO-ENTMCNC: 33.7 G/DL (ref 33.7–35.3)
MCV RBC AUTO: 85 FL (ref 81.4–97.8)
MONOCYTES # BLD AUTO: 0.47 K/UL (ref 0–0.85)
MONOCYTES NFR BLD AUTO: 6.7 % (ref 0–13.4)
NEUTROPHILS # BLD AUTO: 4.75 K/UL (ref 1.82–7.42)
NEUTROPHILS NFR BLD: 67.4 % (ref 44–72)
NRBC # BLD AUTO: 0 K/UL
NRBC BLD-RTO: 0 /100 WBC
PLATELET # BLD AUTO: 199 K/UL (ref 164–446)
PMV BLD AUTO: 10.3 FL (ref 9–12.9)
POTASSIUM SERPL-SCNC: 3.9 MMOL/L (ref 3.6–5.5)
PROT SERPL-MCNC: 7.2 G/DL (ref 6–8.2)
RBC # BLD AUTO: 5.48 M/UL (ref 4.7–6.1)
SODIUM SERPL-SCNC: 141 MMOL/L (ref 135–145)
TROPONIN T SERPL-MCNC: 12 NG/L (ref 6–19)
WBC # BLD AUTO: 7.1 K/UL (ref 4.8–10.8)

## 2020-02-06 PROCEDURE — G0378 HOSPITAL OBSERVATION PER HR: HCPCS

## 2020-02-06 PROCEDURE — 80053 COMPREHEN METABOLIC PANEL: CPT

## 2020-02-06 PROCEDURE — 93005 ELECTROCARDIOGRAM TRACING: CPT | Performed by: EMERGENCY MEDICINE

## 2020-02-06 PROCEDURE — 84484 ASSAY OF TROPONIN QUANT: CPT

## 2020-02-06 PROCEDURE — 93005 ELECTROCARDIOGRAM TRACING: CPT

## 2020-02-06 PROCEDURE — 99219 PR INITIAL OBSERVATION CARE,LEVL II: CPT | Performed by: INTERNAL MEDICINE

## 2020-02-06 PROCEDURE — 36415 COLL VENOUS BLD VENIPUNCTURE: CPT

## 2020-02-06 PROCEDURE — 85025 COMPLETE CBC W/AUTO DIFF WBC: CPT

## 2020-02-06 PROCEDURE — 99285 EMERGENCY DEPT VISIT HI MDM: CPT

## 2020-02-06 RX ORDER — BISACODYL 10 MG
10 SUPPOSITORY, RECTAL RECTAL
Status: DISCONTINUED | OUTPATIENT
Start: 2020-02-06 | End: 2020-02-10 | Stop reason: HOSPADM

## 2020-02-06 RX ORDER — ACETAMINOPHEN 325 MG/1
650 TABLET ORAL EVERY 6 HOURS PRN
Status: DISCONTINUED | OUTPATIENT
Start: 2020-02-06 | End: 2020-02-10 | Stop reason: HOSPADM

## 2020-02-06 RX ORDER — AMOXICILLIN 250 MG
2 CAPSULE ORAL 2 TIMES DAILY
Status: DISCONTINUED | OUTPATIENT
Start: 2020-02-06 | End: 2020-02-10 | Stop reason: HOSPADM

## 2020-02-06 RX ORDER — POLYETHYLENE GLYCOL 3350 17 G/17G
1 POWDER, FOR SOLUTION ORAL
Status: DISCONTINUED | OUTPATIENT
Start: 2020-02-06 | End: 2020-02-10 | Stop reason: HOSPADM

## 2020-02-06 ASSESSMENT — ENCOUNTER SYMPTOMS
HEARTBURN: 0
BLURRED VISION: 0
LOSS OF CONSCIOUSNESS: 1
DIARRHEA: 0
DIZZINESS: 0
WEIGHT LOSS: 0
SPUTUM PRODUCTION: 0
NAUSEA: 0
BACK PAIN: 0
SPEECH CHANGE: 0
ABDOMINAL PAIN: 0
SHORTNESS OF BREATH: 0
TREMORS: 0
EYE PAIN: 0
WEAKNESS: 0
NECK PAIN: 0
COUGH: 0
SEIZURES: 0
PALPITATIONS: 0
WHEEZING: 0
FEVER: 0
PND: 0
CONSTIPATION: 0
FALLS: 0
VOMITING: 0
DEPRESSION: 0
HEADACHES: 0
CHILLS: 0

## 2020-02-06 ASSESSMENT — COGNITIVE AND FUNCTIONAL STATUS - GENERAL
MOBILITY SCORE: 24
SUGGESTED CMS G CODE MODIFIER MOBILITY: CH
DAILY ACTIVITIY SCORE: 24
SUGGESTED CMS G CODE MODIFIER DAILY ACTIVITY: CH

## 2020-02-06 ASSESSMENT — LIFESTYLE VARIABLES
TOTAL SCORE: 0
EVER_SMOKED: YES
ON A TYPICAL DAY WHEN YOU DRINK ALCOHOL HOW MANY DRINKS DO YOU HAVE: 2
EVER FELT BAD OR GUILTY ABOUT YOUR DRINKING: NO
ALCOHOL_USE: YES
HOW MANY TIMES IN THE PAST YEAR HAVE YOU HAD 5 OR MORE DRINKS IN A DAY: 0
SUBSTANCE_ABUSE: 0
EVER HAD A DRINK FIRST THING IN THE MORNING TO STEADY YOUR NERVES TO GET RID OF A HANGOVER: NO
HAVE PEOPLE ANNOYED YOU BY CRITICIZING YOUR DRINKING: NO
AVERAGE NUMBER OF DAYS PER WEEK YOU HAVE A DRINK CONTAINING ALCOHOL: 1
TOTAL SCORE: 0
CONSUMPTION TOTAL: NEGATIVE
TOTAL SCORE: 0
HAVE YOU EVER FELT YOU SHOULD CUT DOWN ON YOUR DRINKING: NO

## 2020-02-06 ASSESSMENT — PATIENT HEALTH QUESTIONNAIRE - PHQ9
2. FEELING DOWN, DEPRESSED, IRRITABLE, OR HOPELESS: NOT AT ALL
2. FEELING DOWN, DEPRESSED, IRRITABLE, OR HOPELESS: NOT AT ALL
SUM OF ALL RESPONSES TO PHQ9 QUESTIONS 1 AND 2: 0
SUM OF ALL RESPONSES TO PHQ9 QUESTIONS 1 AND 2: 0
1. LITTLE INTEREST OR PLEASURE IN DOING THINGS: NOT AT ALL
1. LITTLE INTEREST OR PLEASURE IN DOING THINGS: NOT AT ALL

## 2020-02-06 ASSESSMENT — PAIN SCALES - WONG BAKER: WONGBAKER_NUMERICALRESPONSE: DOESN'T HURT AT ALL

## 2020-02-06 NOTE — ED NOTES
Assumed care of pt in rand bed. Placed on tele moniter, sr on same. denies c/o, observed amb w/o difficulty. Saline lock with blood draw, erp to bedside
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
## 2020-02-07 ENCOUNTER — APPOINTMENT (OUTPATIENT)
Dept: CARDIOLOGY | Facility: MEDICAL CENTER | Age: 60
End: 2020-02-07
Attending: INTERNAL MEDICINE
Payer: OTHER MISCELLANEOUS

## 2020-02-07 ENCOUNTER — APPOINTMENT (OUTPATIENT)
Dept: RADIOLOGY | Facility: MEDICAL CENTER | Age: 60
End: 2020-02-07
Attending: INTERNAL MEDICINE
Payer: OTHER MISCELLANEOUS

## 2020-02-07 LAB
ANION GAP SERPL CALC-SCNC: 12 MMOL/L (ref 0–11.9)
BASOPHILS # BLD AUTO: 0.6 % (ref 0–1.8)
BASOPHILS # BLD: 0.03 K/UL (ref 0–0.12)
BUN SERPL-MCNC: 24 MG/DL (ref 8–22)
CALCIUM SERPL-MCNC: 8.7 MG/DL (ref 8.4–10.2)
CHLORIDE SERPL-SCNC: 108 MMOL/L (ref 96–112)
CO2 SERPL-SCNC: 23 MMOL/L (ref 20–33)
CREAT SERPL-MCNC: 0.71 MG/DL (ref 0.5–1.4)
EOSINOPHIL # BLD AUTO: 0.16 K/UL (ref 0–0.51)
EOSINOPHIL NFR BLD: 3.3 % (ref 0–6.9)
ERYTHROCYTE [DISTWIDTH] IN BLOOD BY AUTOMATED COUNT: 38.2 FL (ref 35.9–50)
GLUCOSE SERPL-MCNC: 103 MG/DL (ref 65–99)
HCT VFR BLD AUTO: 44 % (ref 42–52)
HGB BLD-MCNC: 14.6 G/DL (ref 14–18)
IMM GRANULOCYTES # BLD AUTO: 0.02 K/UL (ref 0–0.11)
IMM GRANULOCYTES NFR BLD AUTO: 0.4 % (ref 0–0.9)
LV EJECT FRACT  99904: 60
LV EJECT FRACT MOD 2C 99903: 67.49
LV EJECT FRACT MOD 4C 99902: 58.89
LV EJECT FRACT MOD BP 99901: 63.51
LYMPHOCYTES # BLD AUTO: 1.29 K/UL (ref 1–4.8)
LYMPHOCYTES NFR BLD: 26.7 % (ref 22–41)
MCH RBC QN AUTO: 28.6 PG (ref 27–33)
MCHC RBC AUTO-ENTMCNC: 33.2 G/DL (ref 33.7–35.3)
MCV RBC AUTO: 86.3 FL (ref 81.4–97.8)
MONOCYTES # BLD AUTO: 0.44 K/UL (ref 0–0.85)
MONOCYTES NFR BLD AUTO: 9.1 % (ref 0–13.4)
NEUTROPHILS # BLD AUTO: 2.89 K/UL (ref 1.82–7.42)
NEUTROPHILS NFR BLD: 59.9 % (ref 44–72)
NRBC # BLD AUTO: 0 K/UL
NRBC BLD-RTO: 0 /100 WBC
PLATELET # BLD AUTO: 171 K/UL (ref 164–446)
PMV BLD AUTO: 10.6 FL (ref 9–12.9)
POTASSIUM SERPL-SCNC: 4.4 MMOL/L (ref 3.6–5.5)
RBC # BLD AUTO: 5.1 M/UL (ref 4.7–6.1)
SODIUM SERPL-SCNC: 143 MMOL/L (ref 135–145)
WBC # BLD AUTO: 4.8 K/UL (ref 4.8–10.8)

## 2020-02-07 PROCEDURE — 700102 HCHG RX REV CODE 250 W/ 637 OVERRIDE(OP): Performed by: INTERNAL MEDICINE

## 2020-02-07 PROCEDURE — 99225 PR SUBSEQUENT OBSERVATION CARE,LEVEL II: CPT | Mod: 25 | Performed by: INTERNAL MEDICINE

## 2020-02-07 PROCEDURE — 99245 OFF/OP CONSLTJ NEW/EST HI 55: CPT | Performed by: INTERNAL MEDICINE

## 2020-02-07 PROCEDURE — A9270 NON-COVERED ITEM OR SERVICE: HCPCS | Performed by: INTERNAL MEDICINE

## 2020-02-07 PROCEDURE — 85025 COMPLETE CBC W/AUTO DIFF WBC: CPT

## 2020-02-07 PROCEDURE — 95816 EEG AWAKE AND DROWSY: CPT | Mod: 26 | Performed by: PSYCHIATRY & NEUROLOGY

## 2020-02-07 PROCEDURE — 93306 TTE W/DOPPLER COMPLETE: CPT | Mod: 26 | Performed by: INTERNAL MEDICINE

## 2020-02-07 PROCEDURE — 99357 PR PROLONGED SERV,INPATIENT,EA ADD 1/2: CPT | Performed by: INTERNAL MEDICINE

## 2020-02-07 PROCEDURE — 93306 TTE W/DOPPLER COMPLETE: CPT

## 2020-02-07 PROCEDURE — 80048 BASIC METABOLIC PNL TOTAL CA: CPT

## 2020-02-07 PROCEDURE — 95816 EEG AWAKE AND DROWSY: CPT | Performed by: PSYCHIATRY & NEUROLOGY

## 2020-02-07 PROCEDURE — G0378 HOSPITAL OBSERVATION PER HR: HCPCS

## 2020-02-07 RX ORDER — LEVETIRACETAM 500 MG/1
500 TABLET ORAL EVERY 12 HOURS
Status: DISCONTINUED | OUTPATIENT
Start: 2020-02-07 | End: 2020-02-10 | Stop reason: HOSPADM

## 2020-02-07 RX ADMIN — ASPIRIN 81 MG: 81 TABLET, COATED ORAL at 05:00

## 2020-02-07 RX ADMIN — LEVETIRACETAM 500 MG: 500 TABLET, FILM COATED ORAL at 18:26

## 2020-02-07 ASSESSMENT — ENCOUNTER SYMPTOMS
SHORTNESS OF BREATH: 0
WHEEZING: 0
BACK PAIN: 0
FEVER: 0
STRIDOR: 0
MYALGIAS: 0
DIZZINESS: 0
COUGH: 0
ABDOMINAL PAIN: 0
NAUSEA: 0
SORE THROAT: 0
HEADACHES: 0
PALPITATIONS: 0
TREMORS: 0
CONSTIPATION: 0
CHILLS: 0
DIARRHEA: 0

## 2020-02-07 NOTE — CARE PLAN
Problem: Discharge Barriers/Planning  Goal: Patient's continuum of care needs will be met  Outcome: PROGRESSING AS EXPECTED  Note:   MRI cancelled and EEG ordered; awaiting results for plan.       Problem: Psychosocial Needs:  Goal: Level of anxiety will decrease  Note:   Pt very anxious, keep reinforce poc and what next testing schedule for anticipation of discharge.

## 2020-02-07 NOTE — PROGRESS NOTES
Telemetry Shift Summary    Rhythm SR/SB  HR Range 59-68  Ectopy oPVC, rPAC, rBig, rCoup  Measurements 0.18/0.10/0.36        Normal Values  Rhythm SR  HR Range    Measurements 0.12-0.20 / 0.06-0.10  / 0.30-0.52

## 2020-02-07 NOTE — PROCEDURES
ROUTINE ELECTROENCEPHALOGRAM REPORT      Referring provider: Dr. Tiwari.     DOS: 2/7/2020 (total recording of 24 minutes)    INDICATION:  Flex Perrin 59 y.o. male presenting with syncope, seizure.     CURRENT ANTIEPILEPTIC REGIMEN: None.     TECHNIQUE: 30 channel routine electroencephalogram (EEG) was performed in accordance with the international 10-20 system. The study was reviewed in bipolar and referential montages. The recording examined the patient during wakeful and drowsy state(s).     DESCRIPTION OF THE RECORD:  During the wakefulness, the background showed a symmetrical 10 Hz alpha activity posteriorly with amplitude of 70 mV.  There was reactivity to eye closure/opening.  A normal anterior-posterior gradient was noted with faster beta frequencies seen anteriorly.  During drowsiness, theta/delta frequencies were seen.    ACTIVATION PROCEDURES:   Intermittent Photic stimulation was performed in a stepwise fashion from 1 to 30 Hz and elicited a normal response (photic driving), most noticeable in the posterior leads.      ICTAL AND/OR INTERICTAL FINDINGS:   There is slowing in the left anterior temporal region and frequent left temporal sharps, rarely and briefly becoming periodic. No clinical events or seizures were reported or recorded during the study.     EKG: sampling of the EKG recording demonstrated sinus rhythm.       INTERPRETATION:  This is an abnormal routine eeg recording in the awake and drowsy state. There is slowing in the left anterior temporal region and frequent left temporal sharps, rarely and briefly becoming periodic. The findings increase risk for seizures and suggest underlying cortical irritability and structural abnormality. Clinical and radiological correlation is recommended.    Updates provided to Dr. Tiwari.         Bucky Lindsey MD   Epilepsy and Neurodiagnostics.   Clinical  of Neurology [employer].    Diplomate in Neurology, Epilepsy, and Electrodiagnostic Medicine.   Office: [phone]  Fax: [phone]

## 2020-02-07 NOTE — PROGRESS NOTES
Bedside report received from Rina GRIFFITH. Assumed care. POC discussed. Pt sitting up at EOB. Safety precautions in place.

## 2020-02-07 NOTE — PROGRESS NOTES
Pt arrived to unit via gurney. Ambulated from gurney to bed, SB assist. Tele monitor applied, vitals taken. Pt assessed. A&O x4. Admit profile and med rec complete. Discussed POC with pt, including MRI and echo. Welcome folder provided and discussed. Communication board filled out. Questions and concerns addressed, verbalized understanding. Fall precautions in place. Pt demonstrates ability to use call light appropriately. Pt left in lowest position. Bed locked and low.

## 2020-02-07 NOTE — DISCHARGE PLANNING
Called [facility] scheduling to schedule a follow up appointment with Tracie Gomez in March. Referral has not been received yet so appointment could not be scheduled.  notified CM RN that since patient is uninsured, self pay amount is $120.

## 2020-02-07 NOTE — CARE PLAN
Problem: Communication  Goal: The ability to communicate needs accurately and effectively will improve  Outcome: PROGRESSING AS EXPECTED  Note:   Will encourage pt to communicate any needs throughout his hospital stay     Problem: Safety  Goal: Will remain free from falls  Outcome: PROGRESSING AS EXPECTED  Note:   Will maintain a clutter free room for pt and encourage pt to use non-slip socks
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
## 2020-02-07 NOTE — DISCHARGE PLANNING
LSW sent email request to patient financial to have pt screened for Medicaid. Pt currently shows as uninsured.

## 2020-02-07 NOTE — CARE PLAN
Problem: Safety  Goal: Will remain free from injury  Outcome: PROGRESSING AS EXPECTED  Note:   Pt call light and belongings with in reach, bed in locked and low position, treaded socks on, bed rails up x2       Problem: Infection  Goal: Will remain free from infection  2/6/2020 2338 by Patsy Esparza R.N.  Outcome: PROGRESSING AS EXPECTED  Note:   Pt shows no s/s of infection, Pt is afebrile, WBC 7.1. Standard precautions in place, hand washing performed before and after pt care.

## 2020-02-07 NOTE — ASSESSMENT & PLAN NOTE
Recently pacemaker placed.  Dr. Acuna from cardiology was consulted and agrees with discharge home with sleep study outpatient after MRI of brain  Pacemaker interrogated without showing any significant malfunction  Monitor on telemetry, echocardiogram is reviewed by cardiology and shows no acute pathology  MRI planned for Monday when his pacemaker can be turned off

## 2020-02-07 NOTE — PROGRESS NOTES
Assessment completed, patient A&Ox4, no c/o pain. Patient left to visit with family, no other needs at this time.
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
## 2020-02-07 NOTE — PROGRESS NOTES
2 RN skin check complete. Patient skin intacnt. Has a small mid abdominal dressing hernia.  Devices in place tele monitor.
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
## 2020-02-07 NOTE — PROGRESS NOTES
Called Medtronic 1800 number for MRI compatible and spoke with Melissa and it is compatible as well as the phone number to call to schedule to have a rep at bedside for the MRI.  Left a message with MRI to schedule.

## 2020-02-07 NOTE — PROGRESS NOTES
"Patient c/o not being able to sleep, patient reports \"I like it to be completely dark.\" Supplied patient with quiet pack. No other needs at this time.   "

## 2020-02-08 ENCOUNTER — APPOINTMENT (OUTPATIENT)
Dept: RADIOLOGY | Facility: MEDICAL CENTER | Age: 60
End: 2020-02-08
Attending: INTERNAL MEDICINE
Payer: OTHER MISCELLANEOUS

## 2020-02-08 PROBLEM — G40.909 SEIZURE DISORDER (HCC): Status: ACTIVE | Noted: 2020-02-08

## 2020-02-08 LAB
ANION GAP SERPL CALC-SCNC: 13 MMOL/L (ref 0–11.9)
BUN SERPL-MCNC: 21 MG/DL (ref 8–22)
CALCIUM SERPL-MCNC: 8.8 MG/DL (ref 8.4–10.2)
CHLORIDE SERPL-SCNC: 105 MMOL/L (ref 96–112)
CO2 SERPL-SCNC: 23 MMOL/L (ref 20–33)
CREAT SERPL-MCNC: 0.7 MG/DL (ref 0.5–1.4)
GLUCOSE SERPL-MCNC: 141 MG/DL (ref 65–99)
POTASSIUM SERPL-SCNC: 3.8 MMOL/L (ref 3.6–5.5)
SODIUM SERPL-SCNC: 141 MMOL/L (ref 135–145)

## 2020-02-08 PROCEDURE — A9270 NON-COVERED ITEM OR SERVICE: HCPCS | Performed by: INTERNAL MEDICINE

## 2020-02-08 PROCEDURE — 700102 HCHG RX REV CODE 250 W/ 637 OVERRIDE(OP): Performed by: INTERNAL MEDICINE

## 2020-02-08 PROCEDURE — 99226 PR SUBSEQUENT OBSERVATION CARE,LEVEL III: CPT | Performed by: INTERNAL MEDICINE

## 2020-02-08 PROCEDURE — 80048 BASIC METABOLIC PNL TOTAL CA: CPT

## 2020-02-08 PROCEDURE — G0378 HOSPITAL OBSERVATION PER HR: HCPCS

## 2020-02-08 RX ADMIN — ASPIRIN 81 MG: 81 TABLET, COATED ORAL at 05:18

## 2020-02-08 RX ADMIN — LEVETIRACETAM 500 MG: 500 TABLET, FILM COATED ORAL at 18:08

## 2020-02-08 RX ADMIN — LEVETIRACETAM 500 MG: 500 TABLET, FILM COATED ORAL at 05:18

## 2020-02-08 ASSESSMENT — ENCOUNTER SYMPTOMS
DIZZINESS: 0
SHORTNESS OF BREATH: 0
COUGH: 0
NAUSEA: 0
DIARRHEA: 0
FEVER: 0
STRIDOR: 0
TREMORS: 0
ORTHOPNEA: 0
ABDOMINAL PAIN: 0
SORE THROAT: 0
NERVOUS/ANXIOUS: 0
BACK PAIN: 0
DIAPHORESIS: 0
MYALGIAS: 0
CONSTIPATION: 0

## 2020-02-08 ASSESSMENT — PAIN SCALES - WONG BAKER: WONGBAKER_NUMERICALRESPONSE: DOESN'T HURT AT ALL

## 2020-02-08 NOTE — PROGRESS NOTES
Report given to Heena GRIFFITH. POC discussed. Pt resting comfortably in family room. No other needs at this time.

## 2020-02-08 NOTE — CARE PLAN
Problem: Communication  Goal: The ability to communicate needs accurately and effectively will improve  Outcome: PROGRESSING AS EXPECTED   Pt verbalizes needs appropriately      Problem: Safety  Goal: Will remain free from injury  Outcome: PROGRESSING AS EXPECTED  Goal: Will remain free from falls  Outcome: PROGRESSING AS EXPECTED   Safety measures in place

## 2020-02-08 NOTE — PROGRESS NOTES
Report received from GLADIS Garner. Pt resting comfortably in bed. Declines any needs at this time. Call light within reach, bed low/locked, bed alarm on. Will continue to monitor.

## 2020-02-08 NOTE — PROGRESS NOTES
Assessment completed, patient A&Ox4, no c/o pain. Warm wash cloths given for patient to freshen up. No other needs at this time.

## 2020-02-08 NOTE — PROGRESS NOTES
Telemetry Shift Summary    Rhythm SR/SB POD  HR Range 59-68  Ectopy oPVC, rBig, rTrig, rTrip, oPAC  Measurements 0.18/0.10/0.42        Normal Values  Rhythm SR  HR Range    Measurements 0.12-0.20 / 0.06-0.10  / 0.30-0.52

## 2020-02-08 NOTE — PROGRESS NOTES
Bedside report received from Beto GRIFFITH. Assumed care. POC discussed. Pt resting comfortably in bed. Safety precautions in place.

## 2020-02-08 NOTE — ASSESSMENT & PLAN NOTE
Suggested by patient having spells of being awake but unresponsive per daughter  EEG is abnormal, start keppra, discussed with neurology Dr. Lindsey who recommends outpatient neurology clinic follow up  MRI of brain Monday as that is when his pacemaker can be turned off for MRI

## 2020-02-08 NOTE — PROGRESS NOTES
Tele strip at 0648 shows SB at 59.      Measurements from am strip were as follows:  IN=0.16  QRS=0.10  QT=0.36    Tele Shift Summary:    Rhythm : SR  Rate : 61-79  Ectopy : Per CCT Maritza, pt had occasional PVCs.     Telemetry monitoring strips placed in pt's chart.

## 2020-02-08 NOTE — CARE PLAN
Problem: Safety  Goal: Will remain free from injury  Outcome: PROGRESSING AS EXPECTED  Note:   Pt call light and belongings with in reach, bed in locked and low position, treaded socks on, bed rails up x2       Problem: Infection  Goal: Will remain free from infection  Outcome: PROGRESSING AS EXPECTED  Note:   Pt shows no s/s of infection, Pt is afebrile, WBC 4.8. Standard precautions in place, hand washing performed before and after pt care.
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
## 2020-02-09 LAB
ANION GAP SERPL CALC-SCNC: 14 MMOL/L (ref 0–11.9)
BUN SERPL-MCNC: 21 MG/DL (ref 8–22)
CALCIUM SERPL-MCNC: 8.9 MG/DL (ref 8.4–10.2)
CHLORIDE SERPL-SCNC: 107 MMOL/L (ref 96–112)
CO2 SERPL-SCNC: 24 MMOL/L (ref 20–33)
CREAT SERPL-MCNC: 0.79 MG/DL (ref 0.5–1.4)
GLUCOSE SERPL-MCNC: 100 MG/DL (ref 65–99)
POTASSIUM SERPL-SCNC: 4.4 MMOL/L (ref 3.6–5.5)
SODIUM SERPL-SCNC: 145 MMOL/L (ref 135–145)

## 2020-02-09 PROCEDURE — A9270 NON-COVERED ITEM OR SERVICE: HCPCS | Performed by: INTERNAL MEDICINE

## 2020-02-09 PROCEDURE — 80048 BASIC METABOLIC PNL TOTAL CA: CPT

## 2020-02-09 PROCEDURE — G0378 HOSPITAL OBSERVATION PER HR: HCPCS

## 2020-02-09 PROCEDURE — 700102 HCHG RX REV CODE 250 W/ 637 OVERRIDE(OP): Performed by: INTERNAL MEDICINE

## 2020-02-09 PROCEDURE — 99225 PR SUBSEQUENT OBSERVATION CARE,LEVEL II: CPT | Performed by: INTERNAL MEDICINE

## 2020-02-09 RX ADMIN — LEVETIRACETAM 500 MG: 500 TABLET, FILM COATED ORAL at 10:45

## 2020-02-09 RX ADMIN — ASPIRIN 81 MG: 81 TABLET, COATED ORAL at 06:12

## 2020-02-09 RX ADMIN — LEVETIRACETAM 500 MG: 500 TABLET, FILM COATED ORAL at 17:36

## 2020-02-09 ASSESSMENT — ENCOUNTER SYMPTOMS
ABDOMINAL PAIN: 0
NERVOUS/ANXIOUS: 0
SORE THROAT: 0
SEIZURES: 0
MYALGIAS: 0
DIZZINESS: 0
SHORTNESS OF BREATH: 0
BACK PAIN: 0
PALPITATIONS: 0
STRIDOR: 0
SPUTUM PRODUCTION: 0
CONSTIPATION: 0
CHILLS: 0
FEVER: 0
COUGH: 0
VOMITING: 0
NAUSEA: 0

## 2020-02-09 NOTE — CARE PLAN
Problem: Communication  Goal: The ability to communicate needs accurately and effectively will improve  Outcome: PROGRESSING AS EXPECTED   Pt verbalizes needs      Problem: Safety  Goal: Will remain free from injury  Outcome: PROGRESSING AS EXPECTED  Goal: Will remain free from falls  Outcome: PROGRESSING AS EXPECTED   Safety measures in place

## 2020-02-09 NOTE — PROGRESS NOTES
Telemetry Shift Summary     Rhythm SR, First Degree Heartblock, Paced on Demand  HR Range 60s  Ectopy RPVCs  Measurements 0.24/0.10/0.40           Normal Values  Rhythm SR  HR Range    Measurements 0.12-0.20 / 0.06-0.10  / 0.30-0.52

## 2020-02-09 NOTE — PROGRESS NOTES
Report received from GLADIS Caruso. Pt resting comfortably in bed. Declines any needs at this time. Call light within reach, bed low/locked, bed alarm on. Will continue to monitor.
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
## 2020-02-09 NOTE — PROGRESS NOTES
Telemetry Shift Summary     Rhythm SR  HR Range 60s  Ectopy none  Measurements 0.14/0.10/0.38           Normal Values  Rhythm SR  HR Range    Measurements 0.12-0.20 / 0.06-0.10  / 0.30-0.52

## 2020-02-09 NOTE — CARE PLAN
Problem: Communication  Goal: The ability to communicate needs accurately and effectively will improve  Outcome: PROGRESSING AS EXPECTED     Problem: Safety  Goal: Will remain free from injury  Outcome: PROGRESSING AS EXPECTED    Ongoing care. No acute issues at this time. Patient is stable waiting for MRI on Monday.

## 2020-02-09 NOTE — PROGRESS NOTES
Telemetry Shift Summary     Rhythm SR  HR Range 60-80  Ectopy F-O PVCs R Big  Measurements 0.18/0.10/0.38       Normal Values  Rhythm SR  HR Range    Measurements 0.12-0.20 / 0.06-0.10  / 0.30-0.52    Ongoing care. No acute issues over night. Patient rested well with no c.o anything. Will continue care and monitoring as order.

## 2020-02-10 ENCOUNTER — PATIENT OUTREACH (OUTPATIENT)
Dept: HEALTH INFORMATION MANAGEMENT | Facility: OTHER | Age: 60
End: 2020-02-10

## 2020-02-10 ENCOUNTER — APPOINTMENT (OUTPATIENT)
Dept: RADIOLOGY | Facility: MEDICAL CENTER | Age: 60
End: 2020-02-10
Attending: INTERNAL MEDICINE
Payer: OTHER MISCELLANEOUS

## 2020-02-10 VITALS
TEMPERATURE: 98 F | OXYGEN SATURATION: 94 % | SYSTOLIC BLOOD PRESSURE: 142 MMHG | DIASTOLIC BLOOD PRESSURE: 82 MMHG | WEIGHT: 222.22 LBS | RESPIRATION RATE: 16 BRPM | BODY MASS INDEX: 33.68 KG/M2 | HEART RATE: 69 BPM | HEIGHT: 68 IN

## 2020-02-10 PROBLEM — R55 SYNCOPE: Status: RESOLVED | Noted: 2018-04-12 | Resolved: 2020-02-10

## 2020-02-10 PROCEDURE — 700102 HCHG RX REV CODE 250 W/ 637 OVERRIDE(OP): Performed by: INTERNAL MEDICINE

## 2020-02-10 PROCEDURE — 96374 THER/PROPH/DIAG INJ IV PUSH: CPT

## 2020-02-10 PROCEDURE — G0378 HOSPITAL OBSERVATION PER HR: HCPCS

## 2020-02-10 PROCEDURE — 99217 PR OBSERVATION CARE DISCHARGE: CPT | Performed by: INTERNAL MEDICINE

## 2020-02-10 PROCEDURE — A9270 NON-COVERED ITEM OR SERVICE: HCPCS | Performed by: INTERNAL MEDICINE

## 2020-02-10 PROCEDURE — 700111 HCHG RX REV CODE 636 W/ 250 OVERRIDE (IP): Performed by: INTERNAL MEDICINE

## 2020-02-10 PROCEDURE — 70551 MRI BRAIN STEM W/O DYE: CPT

## 2020-02-10 RX ORDER — LORAZEPAM 2 MG/ML
2 INJECTION INTRAMUSCULAR ONCE
Status: COMPLETED | OUTPATIENT
Start: 2020-02-10 | End: 2020-02-10

## 2020-02-10 RX ORDER — LEVETIRACETAM 500 MG/1
500 TABLET ORAL EVERY 12 HOURS
Qty: 60 TAB | Refills: 2 | Status: ON HOLD | OUTPATIENT
Start: 2020-02-10 | End: 2021-06-13

## 2020-02-10 RX ADMIN — LORAZEPAM 2 MG: 2 INJECTION INTRAMUSCULAR; INTRAVENOUS at 11:50

## 2020-02-10 RX ADMIN — ASPIRIN 81 MG: 81 TABLET, COATED ORAL at 06:12

## 2020-02-10 RX ADMIN — LEVETIRACETAM 500 MG: 500 TABLET, FILM COATED ORAL at 06:11

## 2020-02-10 NOTE — PROGRESS NOTES
Seng from MRI called back. [id number] is the estimated time for MRI. patient aware. Will wait for transport.

## 2020-02-10 NOTE — PROGRESS NOTES
Report received from Armand GRIFFITH. No new changes. Patient is suppose to get MRI this am. Spoke to EP, pacer should be compatible with MRI. Keppra administered this am. Bed in low locked position, call bell within reach. Continue to monitor.

## 2020-02-10 NOTE — PROGRESS NOTES
Patient became extremely claustrophobic and could not complete the MRI. Dr. Tiwari notified. 2mg IV given at MRI. Will resume care when patient returns to floor.

## 2020-02-10 NOTE — DISCHARGE INSTRUCTIONS
Discharge Instructions per Patsy Tiwari M.D.    Follow up with neurology Tracie Gomez in clinic   Follow up with primary care provider for referral to sleep study    DIET: regular    ACTIVITY: as tolerated, no driving    DIAGNOSIS: syncope, seizure disorder, sleep apnea    Return to ER if symptoms worsen    Discharge Instructions    Discharged to home by car with relative. Discharged via walking, hospital escort: Refused.  Special equipment needed: Not Applicable    Be sure to schedule a follow-up appointment with your primary care doctor or any specialists as instructed.     Discharge Plan:   Influenza Vaccine Indication: Patient Refuses    I understand that a diet low in cholesterol, fat, and sodium is recommended for good health. Unless I have been given specific instructions below for another diet, I accept this instruction as my diet prescription.   Other diet: regualr    Special Instructions: None    · Is patient discharged on Warfarin / Coumadin?   No     Depression / Suicide Risk    As [facility] are [facility], it is important to learn how to keep safe from harming yourself.    Recognize the warning signs:  · Abrupt changes in personality, positive or negative- including increase in energy   · Giving away possessions  · Change in eating patterns- significant weight changes-  positive or negative  · Change in sleeping patterns- unable to sleep or sleeping all the time   · Unwillingness or inability to communicate  · Depression  · Unusual sadness, discouragement and loneliness  · Talk of wanting to die  · Neglect of personal appearance   · Rebelliousness- reckless behavior  · Withdrawal from people/activities they love  · Confusion- inability to concentrate     If you or a loved one observes any of these behaviors or has concerns about self-harm, here's what you can do:  · Talk about it- your feelings and reasons for harming yourself  · Remove any means that you might use to  hurt yourself (examples: pills, rope, extension cords, firearm)  · Get professional help from the community (Mental Health, Substance Abuse, psychological counseling)  · Do not be alone:Call your Safe Contact- someone whom you trust who will be there for you.  · Call your local CRISIS HOTLINE 602-9786 or 933-087-0896  · Call your local Children's Mobile Crisis Response Team [place] (233) 295-3779 or www.[employer]  · Call the toll free National Suicide Prevention Hotlines   · National Suicide Prevention Lifeline 508-266-DBKW (0218)  · Knock Knock Line Network 800-SUICIDE (059-9726)      Seizure, Adult  When you have a seizure:  · Parts of your body may move.  · How aware or awake (conscious) you are may change.  · You may shake (convulse).  Some people have symptoms right before a seizure happens. These symptoms may include:  · Fear.  · Worry (anxiety).  · Feeling like you are going to throw up (nausea).  · Feeling like the room is spinning (vertigo).  · Feeling like you saw or heard something before (aura vu).  · Odd tastes or smells.  · Changes in vision, such as seeing flashing lights or spots.  Seizures usually last from 30 seconds to 2 minutes. Usually, they are not harmful unless they last a long time.  Follow these instructions at home:  Medicines  · Take over-the-counter and prescription medicines only as told by your doctor.  · Avoid anything that may keep your medicine from working, such as alcohol.  Activity  · Do not do any activities that would be dangerous if you had another seizure, like driving or swimming. Wait until your doctor approves.  · If you live in the [place] ask your local DMV (department of [employer]) when you can drive.  · Rest.  Teaching others  · Teach friends and family what to do when you have a seizure. They should:  ¨ Lay you on the ground.  ¨ Protect your head and body.  ¨ Loosen any tight clothing around your neck.  ¨ Turn you on your side.  ¨ Stay with you until  you are better.  ¨ Not hold you down.  ¨ Not put anything in your mouth.  ¨ Know whether or not you need emergency care.  General instructions  · Contact your doctor each time you have a seizure.  · Avoid anything that gives you seizures.  · Keep a seizure diary. Write down:  ¨ What you think caused each seizure.  ¨ What you remember about each seizure.  · Keep all follow-up visits as told by your doctor. This is important.  Contact a doctor if:  · You have another seizure.  · You have seizures more often.  · There is any change in what happens during your seizures.  · You continue to have seizures with treatment.  · You have symptoms of being sick or having an infection.  Get help right away if:  · You have a seizure:  ¨ That lasts longer than 5 minutes.  ¨ That is different than seizures you had before.  ¨ That makes it harder to breathe.  ¨ After you hurt your head.  · After a seizure, you cannot speak or use a part of your body.  · After a seizure, you are confused or have a bad headache.  · You have two or more seizures in a row.  · You are having seizures more often.  · You do not wake up right after a seizure.  · You get hurt during a seizure.  In an emergency:  · These symptoms may be an emergency. Do not wait to see if the symptoms will go away. Get medical help right away. Call your local emergency services (911 in the [place] Do not drive yourself to the hospital.  This information is not intended to replace advice given to you by your health care provider. Make sure you discuss any questions you have with your health care provider.  Document Released: 06/05/2009 Document Revised: 08/30/2017 Document Reviewed: 08/30/2017  Elsevier Interactive Patient Education © 2017 [employer]    Epilepsy  Epilepsy is when a person keeps having seizures. A seizure is unusual activity in the brain. A seizure can change how you think or behave, and it can make it hard to be aware of what is happening.  This condition can  cause problems, such as:  · Falls, accidents, and injury.  · Depression.  · Poor memory.  · Sudden unexplained death in epilepsy (SUDEP). This is rare. Its cause is not known.  Most people with epilepsy lead normal lives.  Follow these instructions at home:  Medicines  · Take medicines only as told by your doctor.  · Avoid anything that may keep your medicine from working, such as alcohol.  Activity  · Get enough rest. Lack of sleep can make seizures more likely to occur.  · Follow your doctor’s advice about driving, swimming, and doing anything else that would be dangerous if you had a seizure.  Teaching others   Teach friends and family what to do if you have a seizure. They should:  · Lay you on the ground to prevent a fall.  · Cushion your head and body.  · Loosen any tight clothing around your neck.  · Turn you on your side.  · Stay with you until you are better.  · Not hold you down.  · Not put anything in your mouth.  · Know whether or not you need emergency care.  General instructions  · Avoid anything that causes you to have seizures.  · Keep a seizure diary. Write down what you remember about each seizure, and especially what might have caused it.  · Keep all follow-up visits as told by your doctor. This is important.  Contact a doctor if:  · You have a change in your seizure pattern.  · You get an infection or start to feel sick. You may have more seizures when you are sick.  Get help right away if:  · A seizure does not stop after 5 minutes.  · You have more than one seizure in a row, and you do not have enough time between the seizures to feel better.  · A seizure makes it harder to breathe.  · A seizure is different from other seizures you have had.  · A seizure makes you unable to speak or use a part of your body.  · You did not wake up right after a seizure.  This information is not intended to replace advice given to you by your health care provider. Make sure you discuss any questions you have with  your health care provider.  Document Released: 10/15/2010 Document Revised: 07/24/2017 Document Reviewed: 06/27/2017  Elsevier Interactive Patient Education © 2017 [employer]

## 2020-02-10 NOTE — PROGRESS NOTES
Spoke to Seng with MRI. Per Seng, pacer needs to be in MRI mode and an RN needs to be present in order for MRI to be completed. He will contact RN and [employer] for mode change and update floor RN. Patient aware.

## 2020-02-10 NOTE — RESPIRATORY CARE
COPD EDUCATION by COPD CLINICAL EDUCATOR  2/10/2020 at 8:17 AM by Francie Wells, RRT     Patient reviewed by COPD education team. Patient does not have a history or diagnosis of COPD and is a former smoker quit 1995, therefore does not qualify for the COPD program.
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
## 2020-02-10 NOTE — FLOWSHEET NOTE
"   02/09/20 1600   Events/Summary/Plan   Events/Summary/Plan Patient does not want to wear cpap at night. He wants to be \"calibrated\" with a sleep study. He has agreeded to wearing oxygen at night by nasal cannula     "

## 2020-02-10 NOTE — PROGRESS NOTES
Patient and sister given discharge information verbalized understanding. IV discontinued. Patient taken to front lobby via ambulation accompanied by RN and family.

## 2020-02-10 NOTE — CARE PLAN
Problem: Safety  Goal: Will remain free from injury  Outcome: PROGRESSING AS EXPECTED  Goal: Will remain free from falls  Outcome: PROGRESSING AS EXPECTED  Note:   Bed in low locked position, call bell within reach,  socks on

## 2020-09-10 ENCOUNTER — HOSPITAL ENCOUNTER (EMERGENCY)
Facility: MEDICAL CENTER | Age: 60
End: 2020-09-11
Attending: EMERGENCY MEDICINE

## 2020-09-10 ENCOUNTER — APPOINTMENT (OUTPATIENT)
Dept: RADIOLOGY | Facility: MEDICAL CENTER | Age: 60
End: 2020-09-10
Attending: PSYCHIATRY & NEUROLOGY

## 2020-09-10 DIAGNOSIS — R41.3 MEMORY LOSS: ICD-10-CM

## 2020-09-10 DIAGNOSIS — G40.909 SEIZURE DISORDER (HCC): ICD-10-CM

## 2020-09-10 LAB
AMMONIA PLAS-SCNC: 23 UMOL/L (ref 11–45)
AMPHET UR QL SCN: NEGATIVE
APPEARANCE UR: CLEAR
BARBITURATES UR QL SCN: NEGATIVE
BENZODIAZ UR QL SCN: NEGATIVE
BILIRUB UR QL STRIP.AUTO: NEGATIVE
BZE UR QL SCN: NEGATIVE
CANNABINOIDS UR QL SCN: NEGATIVE
COLOR UR: YELLOW
EKG IMPRESSION: NORMAL
GLUCOSE UR STRIP.AUTO-MCNC: NEGATIVE MG/DL
HIV 1+2 AB+HIV1 P24 AG SERPL QL IA: NORMAL
KETONES UR STRIP.AUTO-MCNC: NEGATIVE MG/DL
LEUKOCYTE ESTERASE UR QL STRIP.AUTO: NEGATIVE
METHADONE UR QL SCN: NEGATIVE
MICRO URNS: NORMAL
NITRITE UR QL STRIP.AUTO: NEGATIVE
OPIATES UR QL SCN: NEGATIVE
OXYCODONE UR QL SCN: NEGATIVE
PCP UR QL SCN: NEGATIVE
PH UR STRIP.AUTO: 5 [PH] (ref 5–8)
PROPOXYPH UR QL SCN: NEGATIVE
PROT UR QL STRIP: NEGATIVE MG/DL
RBC UR QL AUTO: NEGATIVE
SP GR UR STRIP.AUTO: 1.03
TREPONEMA PALLIDUM IGG+IGM AB [PRESENCE] IN SERUM OR PLASMA BY IMMUNOASSAY: NORMAL
TSH SERPL DL<=0.005 MIU/L-ACNC: 2.4 UIU/ML (ref 0.38–5.33)
UROBILINOGEN UR STRIP.AUTO-MCNC: 1 MG/DL
VIT B12 SERPL-MCNC: 269 PG/ML (ref 211–911)

## 2020-09-10 PROCEDURE — 84425 ASSAY OF VITAMIN B-1: CPT

## 2020-09-10 PROCEDURE — 87389 HIV-1 AG W/HIV-1&-2 AB AG IA: CPT

## 2020-09-10 PROCEDURE — 86780 TREPONEMA PALLIDUM: CPT

## 2020-09-10 PROCEDURE — 99283 EMERGENCY DEPT VISIT LOW MDM: CPT

## 2020-09-10 PROCEDURE — 80307 DRUG TEST PRSMV CHEM ANLYZR: CPT

## 2020-09-10 PROCEDURE — 82140 ASSAY OF AMMONIA: CPT

## 2020-09-10 PROCEDURE — 93005 ELECTROCARDIOGRAM TRACING: CPT | Performed by: EMERGENCY MEDICINE

## 2020-09-10 PROCEDURE — 81003 URINALYSIS AUTO W/O SCOPE: CPT

## 2020-09-10 PROCEDURE — 70450 CT HEAD/BRAIN W/O DYE: CPT

## 2020-09-10 PROCEDURE — 82607 VITAMIN B-12: CPT

## 2020-09-10 PROCEDURE — 84443 ASSAY THYROID STIM HORMONE: CPT

## 2020-09-10 PROCEDURE — 93005 ELECTROCARDIOGRAM TRACING: CPT

## 2020-09-10 ASSESSMENT — FIBROSIS 4 INDEX: FIB4 SCORE: 1.16

## 2020-09-11 VITALS
DIASTOLIC BLOOD PRESSURE: 76 MMHG | TEMPERATURE: 97.7 F | RESPIRATION RATE: 17 BRPM | HEIGHT: 70 IN | OXYGEN SATURATION: 93 % | HEART RATE: 60 BPM | SYSTOLIC BLOOD PRESSURE: 123 MMHG | WEIGHT: 222.66 LBS | BODY MASS INDEX: 31.88 KG/M2

## 2020-09-11 PROCEDURE — 99283 EMERGENCY DEPT VISIT LOW MDM: CPT

## 2020-09-11 RX ORDER — LEVETIRACETAM 500 MG/1
500 TABLET ORAL 2 TIMES DAILY
Qty: 60 TAB | Refills: 0 | Status: SHIPPED | OUTPATIENT
Start: 2020-09-11 | End: 2021-03-03 | Stop reason: SDUPTHER

## 2020-09-11 NOTE — ED NOTES
Patient verbalizes understanding of follow up with PCP and neurology, medications and when to return to the ED.  Patient ambulatory with a steady gait.  All questions answered

## 2020-09-11 NOTE — ED NOTES
Patient provided with box lunch per ERP, patient updated on plan of care.  Call light in reach, will continue to monitor

## 2020-09-11 NOTE — PROGRESS NOTES
Brief Neurology Note    Case discussed ruthy Renee    Subacute to chronic memory difficulty who was recommended to be seen in neurology clinic but was lost to follow up due to the COVID pandemic.     Patient now presents to the ER, with nonacute complaint(s).     Prelim recs:  CT head, TSH, B12, thiamine, RPR, HIV, ammonia, utox, UA    encourage the patient to maintain his out pt neurology clinic visit  < BR>HARLEY Magana MD  Neurology

## 2020-09-11 NOTE — ED NOTES
Patient returned from imaging.  Ambulatory to the bathroom with steady gait.  Patient attempting to provide urine sample
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
## 2020-09-11 NOTE — DISCHARGE INSTRUCTIONS
One last test is still pending (Vit B1)--Please follow up with your PCP for this result.  Dr Magana definitely wants to see you in the clinic.  I have put in a referral for this; if you have not heard from them by tomorrow afternoon, please call to make sure they make you an appt.  For new symptoms or any turn for the worse, return to the ER.

## 2020-09-11 NOTE — ED NOTES
Pt brought to BL 20 by ED tech. Changed into a gown and placed on the monitor. Call light within reach. Report given to GLADIS Kincaid.

## 2020-09-14 LAB — VIT B1 BLD-MCNC: 144 NMOL/L (ref 70–180)

## 2020-10-11 ENCOUNTER — HOSPITAL ENCOUNTER (EMERGENCY)
Facility: MEDICAL CENTER | Age: 60
End: 2020-10-11
Attending: EMERGENCY MEDICINE

## 2020-10-11 VITALS
OXYGEN SATURATION: 100 % | WEIGHT: 220 LBS | RESPIRATION RATE: 18 BRPM | BODY MASS INDEX: 31.57 KG/M2 | TEMPERATURE: 98 F | SYSTOLIC BLOOD PRESSURE: 136 MMHG | DIASTOLIC BLOOD PRESSURE: 93 MMHG | HEART RATE: 78 BPM

## 2020-10-11 DIAGNOSIS — Z76.0 MEDICATION REFILL: ICD-10-CM

## 2020-10-11 PROCEDURE — 99282 EMERGENCY DEPT VISIT SF MDM: CPT

## 2020-10-11 RX ORDER — LEVETIRACETAM 500 MG/1
500 TABLET ORAL 2 TIMES DAILY
Qty: 60 TAB | Refills: 0 | Status: ON HOLD | OUTPATIENT
Start: 2020-10-11 | End: 2021-06-13

## 2020-10-11 ASSESSMENT — FIBROSIS 4 INDEX: FIB4 SCORE: 1.16

## 2020-10-11 NOTE — ED TRIAGE NOTES
Requesting med refill for kepra, has an appointment with neuro coming up but is almost out of medication in the meantime.  No other complaints.

## 2020-10-11 NOTE — ED NOTES
Patient discharged in stable condition per orders. Wristband removed per protocol. Patient verbalized understanding of all discharge instructions. All belongings accounted for.
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
## 2021-02-25 ENCOUNTER — OFFICE VISIT (OUTPATIENT)
Dept: NEUROLOGY | Facility: MEDICAL CENTER | Age: 61
End: 2021-02-25
Attending: STUDENT IN AN ORGANIZED HEALTH CARE EDUCATION/TRAINING PROGRAM

## 2021-02-25 ENCOUNTER — HOSPITAL ENCOUNTER (OUTPATIENT)
Dept: LAB | Facility: MEDICAL CENTER | Age: 61
End: 2021-02-25
Attending: STUDENT IN AN ORGANIZED HEALTH CARE EDUCATION/TRAINING PROGRAM

## 2021-02-25 VITALS
OXYGEN SATURATION: 95 % | SYSTOLIC BLOOD PRESSURE: 126 MMHG | HEIGHT: 69 IN | HEART RATE: 79 BPM | DIASTOLIC BLOOD PRESSURE: 84 MMHG | TEMPERATURE: 98.5 F | BODY MASS INDEX: 33.86 KG/M2 | WEIGHT: 228.6 LBS

## 2021-02-25 DIAGNOSIS — G40.909 SEIZURE DISORDER (HCC): ICD-10-CM

## 2021-02-25 DIAGNOSIS — G47.30 SLEEP APNEA, UNSPECIFIED TYPE: ICD-10-CM

## 2021-02-25 LAB
25(OH)D3 SERPL-MCNC: 26 NG/ML (ref 30–100)
ANION GAP SERPL CALC-SCNC: 11 MMOL/L (ref 7–16)
BASOPHILS # BLD AUTO: 0.5 % (ref 0–1.8)
BASOPHILS # BLD: 0.03 K/UL (ref 0–0.12)
BUN SERPL-MCNC: 21 MG/DL (ref 8–22)
CALCIUM SERPL-MCNC: 9.1 MG/DL (ref 8.5–10.5)
CHLORIDE SERPL-SCNC: 106 MMOL/L (ref 96–112)
CO2 SERPL-SCNC: 24 MMOL/L (ref 20–33)
CREAT SERPL-MCNC: 0.79 MG/DL (ref 0.5–1.4)
CRP SERPL HS-MCNC: 0.14 MG/DL (ref 0–0.75)
EOSINOPHIL # BLD AUTO: 0.08 K/UL (ref 0–0.51)
EOSINOPHIL NFR BLD: 1.3 % (ref 0–6.9)
ERYTHROCYTE [DISTWIDTH] IN BLOOD BY AUTOMATED COUNT: 37 FL (ref 35.9–50)
ERYTHROCYTE [SEDIMENTATION RATE] IN BLOOD BY WESTERGREN METHOD: 1 MM/HOUR (ref 0–20)
GLUCOSE SERPL-MCNC: 93 MG/DL (ref 65–99)
HCT VFR BLD AUTO: 47.5 % (ref 42–52)
HGB BLD-MCNC: 15.7 G/DL (ref 14–18)
IMM GRANULOCYTES # BLD AUTO: 0.02 K/UL (ref 0–0.11)
IMM GRANULOCYTES NFR BLD AUTO: 0.3 % (ref 0–0.9)
LYMPHOCYTES # BLD AUTO: 1.4 K/UL (ref 1–4.8)
LYMPHOCYTES NFR BLD: 22.8 % (ref 22–41)
MCH RBC QN AUTO: 28.5 PG (ref 27–33)
MCHC RBC AUTO-ENTMCNC: 33.1 G/DL (ref 33.7–35.3)
MCV RBC AUTO: 86.2 FL (ref 81.4–97.8)
MONOCYTES # BLD AUTO: 0.43 K/UL (ref 0–0.85)
MONOCYTES NFR BLD AUTO: 7 % (ref 0–13.4)
NEUTROPHILS # BLD AUTO: 4.18 K/UL (ref 1.82–7.42)
NEUTROPHILS NFR BLD: 68.1 % (ref 44–72)
NRBC # BLD AUTO: 0 K/UL
NRBC BLD-RTO: 0 /100 WBC
PLATELET # BLD AUTO: 200 K/UL (ref 164–446)
PMV BLD AUTO: 11.1 FL (ref 9–12.9)
POTASSIUM SERPL-SCNC: 4.2 MMOL/L (ref 3.6–5.5)
RBC # BLD AUTO: 5.51 M/UL (ref 4.7–6.1)
SODIUM SERPL-SCNC: 141 MMOL/L (ref 135–145)
WBC # BLD AUTO: 6.1 K/UL (ref 4.8–10.8)

## 2021-02-25 PROCEDURE — 85652 RBC SED RATE AUTOMATED: CPT

## 2021-02-25 PROCEDURE — 86038 ANTINUCLEAR ANTIBODIES: CPT

## 2021-02-25 PROCEDURE — 80048 BASIC METABOLIC PNL TOTAL CA: CPT

## 2021-02-25 PROCEDURE — 99215 OFFICE O/P EST HI 40 MIN: CPT | Performed by: STUDENT IN AN ORGANIZED HEALTH CARE EDUCATION/TRAINING PROGRAM

## 2021-02-25 PROCEDURE — 85025 COMPLETE CBC W/AUTO DIFF WBC: CPT

## 2021-02-25 PROCEDURE — 82306 VITAMIN D 25 HYDROXY: CPT

## 2021-02-25 PROCEDURE — 80177 DRUG SCRN QUAN LEVETIRACETAM: CPT

## 2021-02-25 PROCEDURE — 99211 OFF/OP EST MAY X REQ PHY/QHP: CPT | Performed by: STUDENT IN AN ORGANIZED HEALTH CARE EDUCATION/TRAINING PROGRAM

## 2021-02-25 PROCEDURE — 36415 COLL VENOUS BLD VENIPUNCTURE: CPT

## 2021-02-25 PROCEDURE — 86140 C-REACTIVE PROTEIN: CPT

## 2021-02-25 ASSESSMENT — FIBROSIS 4 INDEX: FIB4 SCORE: 1.18
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
## 2021-02-27 LAB — LEVETIRACETAM SERPL-MCNC: 10 UG/ML (ref 12–46)

## 2021-02-28 LAB — NUCLEAR IGG SER QL IA: NORMAL

## 2021-03-03 DIAGNOSIS — G40.909 SEIZURE DISORDER (HCC): ICD-10-CM

## 2021-03-03 RX ORDER — LEVETIRACETAM 500 MG/1
500 TABLET ORAL 2 TIMES DAILY
Qty: 60 TABLET | Refills: 0 | Status: SHIPPED | OUTPATIENT
Start: 2021-03-03 | End: 2021-03-31 | Stop reason: SDUPTHER

## 2021-03-03 NOTE — TELEPHONE ENCOUNTER
Received request via: Pharmacy    Was the patient seen in the last year in this department? Yes    Does the patient have an active prescription (recently filled or refills available) for medication(s) requested? No     Patient last seen on 02/25/2021 , medication was last filled on 10/11/2020.

## 2021-03-31 DIAGNOSIS — G40.909 SEIZURE DISORDER (HCC): ICD-10-CM

## 2021-03-31 RX ORDER — LEVETIRACETAM 500 MG/1
TABLET ORAL
Qty: 60 TABLET | Refills: 5 | Status: SHIPPED | OUTPATIENT
Start: 2021-03-31 | End: 2021-09-16 | Stop reason: SDUPTHER

## 2021-04-27 ENCOUNTER — TELEPHONE (OUTPATIENT)
Dept: RADIOLOGY | Facility: MEDICAL CENTER | Age: 61
End: 2021-04-27

## 2021-04-27 ENCOUNTER — HOSPITAL ENCOUNTER (OUTPATIENT)
Dept: RADIOLOGY | Facility: MEDICAL CENTER | Age: 61
End: 2021-04-27
Attending: STUDENT IN AN ORGANIZED HEALTH CARE EDUCATION/TRAINING PROGRAM

## 2021-04-27 DIAGNOSIS — G40.909 SEIZURE DISORDER (HCC): ICD-10-CM

## 2021-04-27 PROCEDURE — A9270 NON-COVERED ITEM OR SERVICE: HCPCS | Performed by: RADIOLOGY

## 2021-04-27 PROCEDURE — 700102 HCHG RX REV CODE 250 W/ 637 OVERRIDE(OP): Performed by: RADIOLOGY

## 2021-04-27 RX ORDER — DIAZEPAM 5 MG/1
10 TABLET ORAL
Status: COMPLETED | OUTPATIENT
Start: 2021-04-27 | End: 2021-04-27

## 2021-04-27 RX ADMIN — DIAZEPAM 10 MG: 5 TABLET ORAL at 11:53

## 2021-04-27 NOTE — DISCHARGE INSTRUCTIONS
MRI ADULT DISCHARGE INSTRUCTIONS    You have been medicated today for your scan. Please follow the instructions below to ensure your safe recovery. If you have any questions or problems, feel free to call us at [phone] or [phone].     1.   Have someone stay with you to assist you as needed.    2.   Do not drive or operate any mechanical devices.    3.   Do not perform any activity that requires concentration. Make no major decisions over the next 24 hours.     4.   Be careful changing positions from laying down to sitting or standing, as you may become dizzy.     5.   Do not drink alcohol for 48 hours.    6.   There are no restrictions for eating your normal meals. Drink fluids.    7.   You may continue your usual medications for pain, tranquilizers, muscle relaxants or sedatives when awake.     8.   Tomorrow, you may continue your normal daily activities.     9.   Pressure dressing on 10 - 15 minutes. If swelling or bleeding occurs when removed, continue placing direct pressure on injection site for another 5 minutes, or until bleeding stops.     Diazepam (VALIUM) Oral solution  What is this medicine?  You were prescribed DIAZEPAM (dye AZ e marlene) for the procedure you had today. This medication is a benzodiazepine. It is used to treat anxiety and nervousness. It also can help treat alcohol withdrawal, relax muscles, and treat certain types of seizures.  This medicine may be used for other purposes; ask your health care provider or pharmacist if you have questions.  What side effects may I notice from receiving this medicine?  Side effects that you should report to your doctor or health care professional as soon as possible:  • allergic reactions like skin rash, itching or hives, swelling of the face, lips, or tongue  • angry, confused, depressed, other mood changes  • breathing problems  • feeling faint or lightheaded, falls  • muscle cramps  • problems with balance, talking,  walking  • restlessness  • tremors  • trouble passing urine or change in the amount of urine  • unusually weak or tired  Side effects that usually do not require medical attention (report to your doctor or health care professional if they continue or are bothersome):  • difficulty sleeping, nightmares  • dizziness, drowsiness, clumsiness, or unsteadiness, a hangover effect  • headache  • nausea, vomiting  This list may not describe all possible side effects. Call your doctor for medical advice about side effects. You may report side effects to FDA at 0-920-DAD-0743.      I have been informed of and understand the above discharge instructions.

## 2021-04-27 NOTE — PROGRESS NOTES
MRI NURSING NOTES:    Discussed DC instructions/restrictions c Pt re RN oral anxiolytic.  Pt opted not to take @ first.  Once on MRI table, Pt requested to receive oral anxiolytic.

## 2021-04-27 NOTE — PROGRESS NOTES
MRI NURSING NOTES:    Pt unable to do MRI c oral Valium.  [employer] rep here to restore Pacer to pre-MRI settings.

## 2021-04-27 NOTE — TELEPHONE ENCOUNTER
"MRI NURSING NOTES:    Pt was scheduled for brain MRI on 3T.  Pt has Medtronic pacemaker that's MRI conditional.  Pt given Valium 10mg PO for claustrophobia - unsuccessful.  Pt given options to discuss c ordering Provider.  Pt became angry and began accusing [facility] of not making a larger machine.  Attempted to explain he is scheduled on our largest machine.  Encouraged Pt to share his feedback.  He states \"It doesn't matter.  All the administrators her are democrats.\"    Bianka, MT rep, restored MT pacemaker  pre-MRI settings. Pt vu.     Pt discharged to Bobby/Father, responsible adults.  Discussed & given DC instructions to Navi pelayo.   "

## 2021-04-27 NOTE — PROGRESS NOTES
MRI NURSING NOTE:    Verified Pt's responsible adult, Bobby (father), he will take care of Pt for the next 24 hours.      [employer] rep confirms leads and can are MRI conditional . Pt denies abandoned device(s) &/or lead(s).  Pt educated when to alert MRI tech/Imaging RN. Pt vu.  Pt tolerated MRI brain scan well s issue(s). MT Pacer set to AOO 80 per THAD Carlton.  HR, EKG, BP, pulse ox monitored during scan.   Pre-mri settings restored p MRI scan per THAD Carlton.   Pt updated. VU.

## 2021-05-03 ENCOUNTER — TELEPHONE (OUTPATIENT)
Dept: NEUROLOGY | Facility: MEDICAL CENTER | Age: 61
End: 2021-05-03

## 2021-05-21 NOTE — IP AVS SNAPSHOT
Benkyo Player Access Code: [email or web address]  Expires: 4/26/2017  4:08 PM    Your email address is not on file at [facility] ([facility]).  Email Addresses are required for you to sign up for Benkyo Player, please contact [phone] to verify your personal information and to provide your email address prior to attempting to register for Benkyo Player.    [place]  [address]   [place] [address]    [email or web address]  A secure, online tool to manage your health information     [facility] ([facility])’s Benkyo Player® is a secure, online tool that connects you to your personalized health information from the privacy of your home -- day or night - making it very easy for you to manage your healthcare. Once the activation process is completed, you can even access your medical information using the Benkyo Player bailee, which is available for free in the Apple Bailee store or Google Play store.     To learn more about Benkyo Player, visit [email or web address]    There are two levels of access available (as shown below):   My Chart Features  [facility] Primary Care Doctor [facility]  Specialists [facility]  Urgent  Care Non-[facility] Primary Care Doctor   Email your healthcare team securely and privately 24/7 X X X    Manage appointments: schedule your next appointment; view details of past/upcoming appointments X      Request prescription refills. X      View recent personal medical records, including lab and immunizations X X X X   View health record, including health history, allergies, medications X X X X   Read reports about your outpatient visits, procedures, consult and ER notes X X X X   See your discharge summary, which is a recap of your hospital and/or ER visit that includes your diagnosis, lab results, and care plan X X  X     How to register for [email or web address]:  Once your e-mail address has been verified, follow the following steps to sign up for [email or web address].     1. Go to  [email or web address]  2. Click on the Sign Up Now box, which takes you to the New Member Sign Up page. You will need to  provide the following information:  a. Enter your [employer] Access Code exactly as it appears at the top of this page. (You will not need to use this code after you’ve completed the sign-up process. If you do not sign up before the expiration date, you must request a new code.)   b. Enter your date of birth.   c. Enter your home email address.   d. Click Submit, and follow the next screen’s instructions.  3. Create a [employer] ID. This will be your [employer] login ID and cannot be changed, so think of one that is secure and easy to remember.  4. Create a [employer] password. You can change your password at any time.  5. Enter your Password Reset Question and Answer. This can be used at a later time if you forget your password.   6. Enter your e-mail address. This allows you to receive e-mail notifications when new information is available in [employer].  7. Click Sign Up. You can now view your health information.    For assistance activating your [employer] account, call [phone]          n/a

## 2021-06-07 ENCOUNTER — HOSPITAL ENCOUNTER (INPATIENT)
Facility: MEDICAL CENTER | Age: 61
LOS: 4 days | DRG: 417 | End: 2021-06-13
Attending: EMERGENCY MEDICINE | Admitting: STUDENT IN AN ORGANIZED HEALTH CARE EDUCATION/TRAINING PROGRAM

## 2021-06-07 ENCOUNTER — APPOINTMENT (OUTPATIENT)
Dept: RADIOLOGY | Facility: MEDICAL CENTER | Age: 61
DRG: 417 | End: 2021-06-07
Attending: EMERGENCY MEDICINE

## 2021-06-07 DIAGNOSIS — K81.0 ACUTE CHOLECYSTITIS: ICD-10-CM

## 2021-06-07 DIAGNOSIS — I35.0 SEVERE AORTIC STENOSIS: ICD-10-CM

## 2021-06-07 DIAGNOSIS — Z90.49 S/P LAPAROSCOPIC CHOLECYSTECTOMY: ICD-10-CM

## 2021-06-07 DIAGNOSIS — R78.81 BACTEREMIA: ICD-10-CM

## 2021-06-07 DIAGNOSIS — K85.10 ACUTE BILIARY PANCREATITIS WITHOUT INFECTION OR NECROSIS: ICD-10-CM

## 2021-06-07 DIAGNOSIS — G47.30 SLEEP APNEA, UNSPECIFIED TYPE: ICD-10-CM

## 2021-06-07 DIAGNOSIS — Z95.3 HISTORY OF HEART VALVE REPLACEMENT WITH BIOPROSTHETIC VALVE: ICD-10-CM

## 2021-06-07 LAB
ALBUMIN SERPL BCP-MCNC: 4.7 G/DL (ref 3.2–4.9)
ALBUMIN/GLOB SERPL: 1.8 G/DL
ALP SERPL-CCNC: 81 U/L (ref 30–99)
ALT SERPL-CCNC: 181 U/L (ref 2–50)
ANION GAP SERPL CALC-SCNC: 11 MMOL/L (ref 7–16)
APPEARANCE UR: CLEAR
APTT PPP: 30.6 SEC (ref 24.7–36)
AST SERPL-CCNC: 143 U/L (ref 12–45)
BASOPHILS # BLD AUTO: 0.3 % (ref 0–1.8)
BASOPHILS # BLD: 0.03 K/UL (ref 0–0.12)
BILIRUB SERPL-MCNC: 2.4 MG/DL (ref 0.1–1.5)
BILIRUB UR QL STRIP.AUTO: ABNORMAL
BUN SERPL-MCNC: 24 MG/DL (ref 8–22)
CALCIUM SERPL-MCNC: 9.5 MG/DL (ref 8.5–10.5)
CHLORIDE SERPL-SCNC: 104 MMOL/L (ref 96–112)
CO2 SERPL-SCNC: 23 MMOL/L (ref 20–33)
COLOR UR: ABNORMAL
CREAT SERPL-MCNC: 0.77 MG/DL (ref 0.5–1.4)
EOSINOPHIL # BLD AUTO: 0.01 K/UL (ref 0–0.51)
EOSINOPHIL NFR BLD: 0.1 % (ref 0–6.9)
ERYTHROCYTE [DISTWIDTH] IN BLOOD BY AUTOMATED COUNT: 38.5 FL (ref 35.9–50)
GLOBULIN SER CALC-MCNC: 2.6 G/DL (ref 1.9–3.5)
GLUCOSE SERPL-MCNC: 106 MG/DL (ref 65–99)
GLUCOSE UR STRIP.AUTO-MCNC: NEGATIVE MG/DL
HCT VFR BLD AUTO: 49.1 % (ref 42–52)
HGB BLD-MCNC: 16.1 G/DL (ref 14–18)
IMM GRANULOCYTES # BLD AUTO: 0.05 K/UL (ref 0–0.11)
IMM GRANULOCYTES NFR BLD AUTO: 0.4 % (ref 0–0.9)
INR PPP: 1.15 (ref 0.87–1.13)
KETONES UR STRIP.AUTO-MCNC: NEGATIVE MG/DL
LEUKOCYTE ESTERASE UR QL STRIP.AUTO: NEGATIVE
LIPASE SERPL-CCNC: 905 U/L (ref 11–82)
LYMPHOCYTES # BLD AUTO: 0.87 K/UL (ref 1–4.8)
LYMPHOCYTES NFR BLD: 7.8 % (ref 22–41)
MCH RBC QN AUTO: 28.4 PG (ref 27–33)
MCHC RBC AUTO-ENTMCNC: 32.8 G/DL (ref 33.7–35.3)
MCV RBC AUTO: 86.7 FL (ref 81.4–97.8)
MICRO URNS: ABNORMAL
MONOCYTES # BLD AUTO: 0.57 K/UL (ref 0–0.85)
MONOCYTES NFR BLD AUTO: 5.1 % (ref 0–13.4)
NEUTROPHILS # BLD AUTO: 9.62 K/UL (ref 1.82–7.42)
NEUTROPHILS NFR BLD: 86.3 % (ref 44–72)
NITRITE UR QL STRIP.AUTO: NEGATIVE
NRBC # BLD AUTO: 0 K/UL
NRBC BLD-RTO: 0 /100 WBC
PH UR STRIP.AUTO: 5 [PH] (ref 5–8)
PLATELET # BLD AUTO: 171 K/UL (ref 164–446)
PMV BLD AUTO: 11 FL (ref 9–12.9)
POTASSIUM SERPL-SCNC: 4.4 MMOL/L (ref 3.6–5.5)
PROT SERPL-MCNC: 7.3 G/DL (ref 6–8.2)
PROT UR QL STRIP: NEGATIVE MG/DL
PROTHROMBIN TIME: 15.1 SEC (ref 12–14.6)
RBC # BLD AUTO: 5.66 M/UL (ref 4.7–6.1)
RBC UR QL AUTO: NEGATIVE
SODIUM SERPL-SCNC: 138 MMOL/L (ref 135–145)
SP GR UR STRIP.AUTO: 1.03
UROBILINOGEN UR STRIP.AUTO-MCNC: 1 MG/DL
WBC # BLD AUTO: 11.2 K/UL (ref 4.8–10.8)

## 2021-06-07 PROCEDURE — 84443 ASSAY THYROID STIM HORMONE: CPT

## 2021-06-07 PROCEDURE — 700111 HCHG RX REV CODE 636 W/ 250 OVERRIDE (IP): Performed by: EMERGENCY MEDICINE

## 2021-06-07 PROCEDURE — 85610 PROTHROMBIN TIME: CPT

## 2021-06-07 PROCEDURE — 85025 COMPLETE CBC W/AUTO DIFF WBC: CPT

## 2021-06-07 PROCEDURE — 36415 COLL VENOUS BLD VENIPUNCTURE: CPT

## 2021-06-07 PROCEDURE — 700105 HCHG RX REV CODE 258: Performed by: EMERGENCY MEDICINE

## 2021-06-07 PROCEDURE — 99285 EMERGENCY DEPT VISIT HI MDM: CPT

## 2021-06-07 PROCEDURE — 81003 URINALYSIS AUTO W/O SCOPE: CPT

## 2021-06-07 PROCEDURE — 85730 THROMBOPLASTIN TIME PARTIAL: CPT

## 2021-06-07 PROCEDURE — 76705 ECHO EXAM OF ABDOMEN: CPT

## 2021-06-07 PROCEDURE — 80053 COMPREHEN METABOLIC PANEL: CPT

## 2021-06-07 PROCEDURE — 96375 TX/PRO/DX INJ NEW DRUG ADDON: CPT

## 2021-06-07 PROCEDURE — 83690 ASSAY OF LIPASE: CPT

## 2021-06-07 RX ORDER — LEVETIRACETAM 5 MG/ML
500 INJECTION INTRAVASCULAR ONCE
Status: COMPLETED | OUTPATIENT
Start: 2021-06-07 | End: 2021-06-07

## 2021-06-07 RX ORDER — SODIUM CHLORIDE, SODIUM LACTATE, POTASSIUM CHLORIDE, CALCIUM CHLORIDE 600; 310; 30; 20 MG/100ML; MG/100ML; MG/100ML; MG/100ML
1000 INJECTION, SOLUTION INTRAVENOUS ONCE
Status: COMPLETED | OUTPATIENT
Start: 2021-06-07 | End: 2021-06-07

## 2021-06-07 RX ADMIN — SODIUM CHLORIDE, POTASSIUM CHLORIDE, SODIUM LACTATE AND CALCIUM CHLORIDE 1000 ML: 600; 310; 30; 20 INJECTION, SOLUTION INTRAVENOUS at 21:45

## 2021-06-07 RX ADMIN — LEVETIRACETAM INJECTION 500 MG: 5 INJECTION INTRAVENOUS at 22:00

## 2021-06-07 ASSESSMENT — FIBROSIS 4 INDEX: FIB4 SCORE: 1.07

## 2021-06-07 ASSESSMENT — PAIN DESCRIPTION - DESCRIPTORS: DESCRIPTORS: SHOOTING
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
## 2021-06-08 PROBLEM — R56.9 SEIZURE (HCC): Status: ACTIVE | Noted: 2020-02-08

## 2021-06-08 PROBLEM — R74.01 TRANSAMINITIS: Status: ACTIVE | Noted: 2021-06-08

## 2021-06-08 PROBLEM — R78.81 BACTEREMIA: Status: ACTIVE | Noted: 2021-06-08

## 2021-06-08 PROBLEM — E80.6 BILIRUBINEMIA: Status: ACTIVE | Noted: 2021-06-08

## 2021-06-08 PROBLEM — K85.90 PANCREATITIS: Status: ACTIVE | Noted: 2021-06-08

## 2021-06-08 PROBLEM — I10 HTN (HYPERTENSION): Status: ACTIVE | Noted: 2021-06-08

## 2021-06-08 LAB
B PARAP IS1001 DNA NPH QL NAA+NON-PROBE: NOT DETECTED
B PERT.PT PRMT NPH QL NAA+NON-PROBE: NOT DETECTED
BASOPHILS # BLD AUTO: 0.4 % (ref 0–1.8)
BASOPHILS # BLD: 0.03 K/UL (ref 0–0.12)
C PNEUM DNA NPH QL NAA+NON-PROBE: NOT DETECTED
EOSINOPHIL # BLD AUTO: 0.09 K/UL (ref 0–0.51)
EOSINOPHIL NFR BLD: 1.3 % (ref 0–6.9)
ERYTHROCYTE [DISTWIDTH] IN BLOOD BY AUTOMATED COUNT: 38.5 FL (ref 35.9–50)
FLUAV RNA NPH QL NAA+NON-PROBE: NOT DETECTED
FLUBV RNA NPH QL NAA+NON-PROBE: NOT DETECTED
HADV DNA NPH QL NAA+NON-PROBE: NOT DETECTED
HCOV 229E RNA NPH QL NAA+NON-PROBE: NOT DETECTED
HCOV HKU1 RNA NPH QL NAA+NON-PROBE: NOT DETECTED
HCOV NL63 RNA NPH QL NAA+NON-PROBE: NOT DETECTED
HCOV OC43 RNA NPH QL NAA+NON-PROBE: NOT DETECTED
HCT VFR BLD AUTO: 42.6 % (ref 42–52)
HGB BLD-MCNC: 14 G/DL (ref 14–18)
HMPV RNA NPH QL NAA+NON-PROBE: NOT DETECTED
HPIV1 RNA NPH QL NAA+NON-PROBE: NOT DETECTED
HPIV2 RNA NPH QL NAA+NON-PROBE: NOT DETECTED
HPIV3 RNA NPH QL NAA+NON-PROBE: NOT DETECTED
HPIV4 RNA NPH QL NAA+NON-PROBE: NOT DETECTED
IMM GRANULOCYTES # BLD AUTO: 0.03 K/UL (ref 0–0.11)
IMM GRANULOCYTES NFR BLD AUTO: 0.4 % (ref 0–0.9)
LACTATE BLD-SCNC: 1.3 MMOL/L (ref 0.5–2)
LYMPHOCYTES # BLD AUTO: 0.79 K/UL (ref 1–4.8)
LYMPHOCYTES NFR BLD: 11.4 % (ref 22–41)
M PNEUMO DNA NPH QL NAA+NON-PROBE: NOT DETECTED
MAGNESIUM SERPL-MCNC: 1.8 MG/DL (ref 1.5–2.5)
MCH RBC QN AUTO: 28.6 PG (ref 27–33)
MCHC RBC AUTO-ENTMCNC: 32.9 G/DL (ref 33.7–35.3)
MCV RBC AUTO: 86.9 FL (ref 81.4–97.8)
MONOCYTES # BLD AUTO: 0.49 K/UL (ref 0–0.85)
MONOCYTES NFR BLD AUTO: 7 % (ref 0–13.4)
NEUTROPHILS # BLD AUTO: 5.53 K/UL (ref 1.82–7.42)
NEUTROPHILS NFR BLD: 79.5 % (ref 44–72)
NRBC # BLD AUTO: 0 K/UL
NRBC BLD-RTO: 0 /100 WBC
PLATELET # BLD AUTO: 134 K/UL (ref 164–446)
PMV BLD AUTO: 10.6 FL (ref 9–12.9)
RBC # BLD AUTO: 4.9 M/UL (ref 4.7–6.1)
RSV RNA NPH QL NAA+NON-PROBE: NOT DETECTED
RV+EV RNA NPH QL NAA+NON-PROBE: NOT DETECTED
SARS-COV-2 RNA NPH QL NAA+NON-PROBE: NOTDETECTED
TSH SERPL DL<=0.005 MIU/L-ACNC: 0.93 UIU/ML (ref 0.38–5.33)
WBC # BLD AUTO: 7 K/UL (ref 4.8–10.8)

## 2021-06-08 PROCEDURE — 96375 TX/PRO/DX INJ NEW DRUG ADDON: CPT

## 2021-06-08 PROCEDURE — 87186 SC STD MICRODIL/AGAR DIL: CPT

## 2021-06-08 PROCEDURE — 96367 TX/PROPH/DG ADDL SEQ IV INF: CPT

## 2021-06-08 PROCEDURE — 700105 HCHG RX REV CODE 258: Performed by: NURSE PRACTITIONER

## 2021-06-08 PROCEDURE — 87798 DETECT AGENT NOS DNA AMP: CPT

## 2021-06-08 PROCEDURE — 74177 CT ABD & PELVIS W/CONTRAST: CPT

## 2021-06-08 PROCEDURE — 99220 PR INITIAL OBSERVATION CARE,LEVL III: CPT | Performed by: STUDENT IN AN ORGANIZED HEALTH CARE EDUCATION/TRAINING PROGRAM

## 2021-06-08 PROCEDURE — 87581 M.PNEUMON DNA AMP PROBE: CPT

## 2021-06-08 PROCEDURE — 700102 HCHG RX REV CODE 250 W/ 637 OVERRIDE(OP): Performed by: STUDENT IN AN ORGANIZED HEALTH CARE EDUCATION/TRAINING PROGRAM

## 2021-06-08 PROCEDURE — 700117 HCHG RX CONTRAST REV CODE 255: Performed by: EMERGENCY MEDICINE

## 2021-06-08 PROCEDURE — G0378 HOSPITAL OBSERVATION PER HR: HCPCS

## 2021-06-08 PROCEDURE — 700101 HCHG RX REV CODE 250: Performed by: EMERGENCY MEDICINE

## 2021-06-08 PROCEDURE — 700105 HCHG RX REV CODE 258: Performed by: INTERNAL MEDICINE

## 2021-06-08 PROCEDURE — 85025 COMPLETE CBC W/AUTO DIFF WBC: CPT

## 2021-06-08 PROCEDURE — 87040 BLOOD CULTURE FOR BACTERIA: CPT | Mod: 91

## 2021-06-08 PROCEDURE — A9270 NON-COVERED ITEM OR SERVICE: HCPCS | Performed by: STUDENT IN AN ORGANIZED HEALTH CARE EDUCATION/TRAINING PROGRAM

## 2021-06-08 PROCEDURE — 96366 THER/PROPH/DIAG IV INF ADDON: CPT

## 2021-06-08 PROCEDURE — 87633 RESP VIRUS 12-25 TARGETS: CPT

## 2021-06-08 PROCEDURE — 96365 THER/PROPH/DIAG IV INF INIT: CPT

## 2021-06-08 PROCEDURE — 700111 HCHG RX REV CODE 636 W/ 250 OVERRIDE (IP): Performed by: EMERGENCY MEDICINE

## 2021-06-08 PROCEDURE — 83735 ASSAY OF MAGNESIUM: CPT

## 2021-06-08 PROCEDURE — 87486 CHLMYD PNEUM DNA AMP PROBE: CPT

## 2021-06-08 PROCEDURE — 83605 ASSAY OF LACTIC ACID: CPT

## 2021-06-08 PROCEDURE — 700105 HCHG RX REV CODE 258: Performed by: STUDENT IN AN ORGANIZED HEALTH CARE EDUCATION/TRAINING PROGRAM

## 2021-06-08 PROCEDURE — 87077 CULTURE AEROBIC IDENTIFY: CPT | Mod: 91

## 2021-06-08 PROCEDURE — 700111 HCHG RX REV CODE 636 W/ 250 OVERRIDE (IP): Performed by: INTERNAL MEDICINE

## 2021-06-08 RX ORDER — POLYETHYLENE GLYCOL 3350 17 G/17G
1 POWDER, FOR SOLUTION ORAL
Status: DISCONTINUED | OUTPATIENT
Start: 2021-06-08 | End: 2021-06-13 | Stop reason: HOSPADM

## 2021-06-08 RX ORDER — ENALAPRILAT 1.25 MG/ML
1.25 INJECTION INTRAVENOUS EVERY 6 HOURS PRN
Status: DISCONTINUED | OUTPATIENT
Start: 2021-06-08 | End: 2021-06-13 | Stop reason: HOSPADM

## 2021-06-08 RX ORDER — LORAZEPAM 2 MG/ML
1 INJECTION INTRAMUSCULAR ONCE
Status: ACTIVE | OUTPATIENT
Start: 2021-06-08 | End: 2021-06-09

## 2021-06-08 RX ORDER — SODIUM CHLORIDE, SODIUM LACTATE, POTASSIUM CHLORIDE, CALCIUM CHLORIDE 600; 310; 30; 20 MG/100ML; MG/100ML; MG/100ML; MG/100ML
INJECTION, SOLUTION INTRAVENOUS CONTINUOUS
Status: DISCONTINUED | OUTPATIENT
Start: 2021-06-08 | End: 2021-06-08

## 2021-06-08 RX ORDER — AMOXICILLIN 250 MG
2 CAPSULE ORAL 2 TIMES DAILY
Status: DISCONTINUED | OUTPATIENT
Start: 2021-06-08 | End: 2021-06-13 | Stop reason: HOSPADM

## 2021-06-08 RX ORDER — SODIUM CHLORIDE 9 MG/ML
INJECTION, SOLUTION INTRAVENOUS CONTINUOUS
Status: DISCONTINUED | OUTPATIENT
Start: 2021-06-08 | End: 2021-06-12

## 2021-06-08 RX ORDER — ONDANSETRON 2 MG/ML
4 INJECTION INTRAMUSCULAR; INTRAVENOUS EVERY 4 HOURS PRN
Status: DISCONTINUED | OUTPATIENT
Start: 2021-06-08 | End: 2021-06-13 | Stop reason: HOSPADM

## 2021-06-08 RX ORDER — PROMETHAZINE HYDROCHLORIDE 25 MG/1
12.5-25 SUPPOSITORY RECTAL EVERY 4 HOURS PRN
Status: DISCONTINUED | OUTPATIENT
Start: 2021-06-08 | End: 2021-06-13 | Stop reason: HOSPADM

## 2021-06-08 RX ORDER — PROCHLORPERAZINE EDISYLATE 5 MG/ML
5-10 INJECTION INTRAMUSCULAR; INTRAVENOUS EVERY 4 HOURS PRN
Status: DISCONTINUED | OUTPATIENT
Start: 2021-06-08 | End: 2021-06-13 | Stop reason: HOSPADM

## 2021-06-08 RX ORDER — PROMETHAZINE HYDROCHLORIDE 25 MG/1
12.5-25 TABLET ORAL EVERY 4 HOURS PRN
Status: DISCONTINUED | OUTPATIENT
Start: 2021-06-08 | End: 2021-06-13 | Stop reason: HOSPADM

## 2021-06-08 RX ORDER — ACETAMINOPHEN 325 MG/1
650 TABLET ORAL EVERY 6 HOURS PRN
Status: DISCONTINUED | OUTPATIENT
Start: 2021-06-08 | End: 2021-06-13 | Stop reason: HOSPADM

## 2021-06-08 RX ORDER — CLONIDINE HYDROCHLORIDE 0.1 MG/1
0.1 TABLET ORAL EVERY 6 HOURS PRN
Status: DISCONTINUED | OUTPATIENT
Start: 2021-06-08 | End: 2021-06-13 | Stop reason: HOSPADM

## 2021-06-08 RX ORDER — LABETALOL HYDROCHLORIDE 5 MG/ML
10 INJECTION, SOLUTION INTRAVENOUS EVERY 4 HOURS PRN
Status: DISCONTINUED | OUTPATIENT
Start: 2021-06-08 | End: 2021-06-13 | Stop reason: HOSPADM

## 2021-06-08 RX ORDER — ONDANSETRON 4 MG/1
4 TABLET, ORALLY DISINTEGRATING ORAL EVERY 4 HOURS PRN
Status: DISCONTINUED | OUTPATIENT
Start: 2021-06-08 | End: 2021-06-13 | Stop reason: HOSPADM

## 2021-06-08 RX ORDER — LEVETIRACETAM 500 MG/1
500 TABLET ORAL EVERY 12 HOURS
Status: DISCONTINUED | OUTPATIENT
Start: 2021-06-08 | End: 2021-06-13 | Stop reason: HOSPADM

## 2021-06-08 RX ORDER — BISACODYL 10 MG
10 SUPPOSITORY, RECTAL RECTAL
Status: DISCONTINUED | OUTPATIENT
Start: 2021-06-08 | End: 2021-06-13 | Stop reason: HOSPADM

## 2021-06-08 RX ADMIN — CEFTRIAXONE SODIUM 2 G: 10 INJECTION, POWDER, FOR SOLUTION INTRAVENOUS at 01:45

## 2021-06-08 RX ADMIN — ACETAMINOPHEN 650 MG: 325 TABLET, FILM COATED ORAL at 16:59

## 2021-06-08 RX ADMIN — DOCUSATE SODIUM 50 MG AND SENNOSIDES 8.6 MG 2 TABLET: 8.6; 5 TABLET, FILM COATED ORAL at 05:06

## 2021-06-08 RX ADMIN — PIPERACILLIN AND TAZOBACTAM 3.38 G: 3; .375 INJECTION, POWDER, LYOPHILIZED, FOR SOLUTION INTRAVENOUS; PARENTERAL at 20:47

## 2021-06-08 RX ADMIN — LEVETIRACETAM 500 MG: 500 TABLET ORAL at 16:59

## 2021-06-08 RX ADMIN — PIPERACILLIN AND TAZOBACTAM 3.38 G: 3; .375 INJECTION, POWDER, LYOPHILIZED, FOR SOLUTION INTRAVENOUS; PARENTERAL at 15:56

## 2021-06-08 RX ADMIN — ASPIRIN 81 MG: 81 TABLET, COATED ORAL at 05:06

## 2021-06-08 RX ADMIN — METRONIDAZOLE 500 MG: 500 INJECTION, SOLUTION INTRAVENOUS at 01:45

## 2021-06-08 RX ADMIN — IOHEXOL 100 ML: 350 INJECTION, SOLUTION INTRAVENOUS at 00:19

## 2021-06-08 RX ADMIN — ACETAMINOPHEN 650 MG: 325 TABLET, FILM COATED ORAL at 03:03

## 2021-06-08 RX ADMIN — SODIUM CHLORIDE: 9 INJECTION, SOLUTION INTRAVENOUS at 18:45

## 2021-06-08 RX ADMIN — SODIUM CHLORIDE, POTASSIUM CHLORIDE, SODIUM LACTATE AND CALCIUM CHLORIDE: 600; 310; 30; 20 INJECTION, SOLUTION INTRAVENOUS at 02:00

## 2021-06-08 RX ADMIN — LEVETIRACETAM 500 MG: 500 TABLET ORAL at 05:06

## 2021-06-08 ASSESSMENT — LIFESTYLE VARIABLES
CONSUMPTION TOTAL: NEGATIVE
ALCOHOL_USE: NO
AVERAGE NUMBER OF DAYS PER WEEK YOU HAVE A DRINK CONTAINING ALCOHOL: 0
TOTAL SCORE: 0
TOTAL SCORE: 0
EVER FELT BAD OR GUILTY ABOUT YOUR DRINKING: NO
HAVE PEOPLE ANNOYED YOU BY CRITICIZING YOUR DRINKING: NO
DOES PATIENT WANT TO STOP DRINKING: CANNOT ASSESS
TOTAL SCORE: 0
EVER HAD A DRINK FIRST THING IN THE MORNING TO STEADY YOUR NERVES TO GET RID OF A HANGOVER: NO
HOW MANY TIMES IN THE PAST YEAR HAVE YOU HAD 5 OR MORE DRINKS IN A DAY: 0
ON A TYPICAL DAY WHEN YOU DRINK ALCOHOL HOW MANY DRINKS DO YOU HAVE: 0
HAVE YOU EVER FELT YOU SHOULD CUT DOWN ON YOUR DRINKING: NO

## 2021-06-08 ASSESSMENT — ENCOUNTER SYMPTOMS
ABDOMINAL PAIN: 1
FEVER: 1
VOMITING: 0
CHILLS: 1
NAUSEA: 1

## 2021-06-08 ASSESSMENT — PATIENT HEALTH QUESTIONNAIRE - PHQ9
SUM OF ALL RESPONSES TO PHQ9 QUESTIONS 1 AND 2: 0
1. LITTLE INTEREST OR PLEASURE IN DOING THINGS: NOT AT ALL
1. LITTLE INTEREST OR PLEASURE IN DOING THINGS: NOT AT ALL
2. FEELING DOWN, DEPRESSED, IRRITABLE, OR HOPELESS: NOT AT ALL
SUM OF ALL RESPONSES TO PHQ9 QUESTIONS 1 AND 2: 0
2. FEELING DOWN, DEPRESSED, IRRITABLE, OR HOPELESS: NOT AT ALL

## 2021-06-08 ASSESSMENT — PAIN DESCRIPTION - PAIN TYPE
TYPE: ACUTE PAIN
TYPE: ACUTE PAIN

## 2021-06-08 ASSESSMENT — FIBROSIS 4 INDEX: FIB4 SCORE: 3.79

## 2021-06-08 NOTE — ASSESSMENT & PLAN NOTE
History of seizure disorder  Keppra BID, follows with neurology Dr. Cook  Seizure precautions in place  Most recent seizure 2 years ago per patient

## 2021-06-08 NOTE — DISCHARGE PLANNING
Care Transition Team Assessment    Attempted to speak with patient but patient sleeping chart reviewed. Lives in [facility]. Has no PCP listed and no insurance listed. Unknown needs @ present time.     Information Source  Orientation Level:  (Sleeping)  Information Given By:  (Chart reviewed.)    Readmission Evaluation  Is this a readmission?: No    Interdisciplinary Discharge Planning  Primary Care Physician: None listed  Lives with - Patient's Self Care Capacity: Unable To Determine At This Time  Patient or legal guardian wants to designate a caregiver: No  Support Systems: Children  Housing / Facility: Unable To Determine At This Time  Do You Take your Prescribed Medications Regularly:  (Unknown)  Able to Return to Previous ADL's: Yes  Mobility Issues: No  Prior Services: Unable To Determine At This Time  Assistance Needed: Unknown at this Time    Discharge Preparedness  What are your discharge supports?: Child    Finances  Prescription Coverage: No  Prescription Coverage Comments: No insurance listed    Discharge Risks or Barriers  Patient risk factors: No PCP, Uninsured or underinsured    Anticipated Discharge Information  Discharge Contact Phone Number: [phone]

## 2021-06-08 NOTE — PROGRESS NOTES
Assessment done, Pt educated on plan of care. Waiting on dr rounds  Patient resting in bed, no signs of distress,  no complaints of pain at this time patient states his pain is controlled at this time. Call light within reach,  side rails up, will monitor. Condition stable

## 2021-06-08 NOTE — ASSESSMENT & PLAN NOTE
Lipase 905 on admission  Improving  MRCP - no choledocholithiasis and positive acute cholecystitis  Continue IV ceftriaxone  Continue IV hydration  Advance diet as tolerated

## 2021-06-08 NOTE — PROGRESS NOTES
This is a 60-year-old male admitted with epigastric abdominal pain found to have acute pancreatitis with elevated lipase and inflammatory pancreatic changes noted on CT abdomen.  There is also noted to be borderline gallbladder wall thickening with some pericholecystic fluid and no evidence of gallstones on CT.  MRCP pending.  The patient's pain is progressively improving.  We will continue bowel rest for now.  Advance diet as tolerated.  Remains afebrile stable vital signs.  Continue pain control.    Addendum:  Patient noted to have BC growing GNR.  Initiated zosyn.  Clinically appears stable.  Stat MRCP.
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
## 2021-06-08 NOTE — PROGRESS NOTES
"Patient turned on call light went to answer light patient was upset. \"That is why I don't like male nurses\" attempted to calm patient down got little more agitated disconnected pulse ox so patient can use the bathroom. Felicita supervisor discussed with patient poc and concerns   "

## 2021-06-08 NOTE — ASSESSMENT & PLAN NOTE
MRCP - no choledocholithiasis and positive acute cholecystitis  BC growing E. Coli.  Pansensitive.  Clinically improving, remains afebrile.  Suspect GI source  Continue ceftriaxone

## 2021-06-08 NOTE — PROGRESS NOTES
2 RN Skin Assessment Completed by Amber RN and Tammie RN.     Head: WDL  Ears: WDL  Nose: WDL  Mouth: WDL  Neck: WDL  Breasts/Chest: WDL  Shoulder Blades: WDL  Spine: WDL  (R) Arm/Elbow/hand: WDL  (L) Arm/Elbow/hand: WDL  Abdomen:WDL  Groin: WDL  Sacrum/Coccyx/Buttocks: blanching  (R) Leg: WDL  (L) Leg: WDL  (R) Heel/Foot/Toe: blanching  (L) Heel/Foot/Toe: blanching              Devices in place: BP Cuff and Pulse Ox     Interventions in place: Gray ear foams and Pillows     Possible skin injury found: No     Pictures uploaded into Epic: N/A  Wound Consult Placed: N/A

## 2021-06-08 NOTE — ED NOTES
Med Rec completed: per patient at bedside  Preferred Pharmacy: [id number]  Allergies:  No Known Allergies    No ORAL antibiotics in last 14 days    Home Medications:  Medication Sig Comments   • levETIRAcetam (KEPPRA) 500 MG Tab Take 1 tablet by mouth twice daily (Patient taking differently: Take 500 mg by mouth 2 times a day.)      **Patient reports he will occasionally take a aspirin or multivitamin but is not consistent. Last doses a few weeks ago.
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
## 2021-06-08 NOTE — PROGRESS NOTES
Microbiology called with positive blood cultures; gram negative rods. Critical lab result read back.   Dr. Rodríguez notified of critical lab result at 1501.  Critical lab result read back by Dr. Rodríguez.

## 2021-06-08 NOTE — CARE PLAN
Problem: Knowledge Deficit - Standard  Goal: Patient and family/care givers will demonstrate understanding of plan of care, disease process/condition, diagnostic tests and medications  Outcome: Progressing     Problem: Pain - Standard  Goal: Alleviation of pain or a reduction in pain to the patient’s comfort goal  Outcome: Progressing   The patient is Stable - Low risk of patient condition declining or worsening         Progress made toward(s) clinical / shift goals:  updated poc    Patient is not progressing towards the following goals:

## 2021-06-09 ENCOUNTER — APPOINTMENT (OUTPATIENT)
Dept: RADIOLOGY | Facility: MEDICAL CENTER | Age: 61
DRG: 417 | End: 2021-06-09
Attending: INTERNAL MEDICINE

## 2021-06-09 LAB
ALBUMIN SERPL BCP-MCNC: 3.6 G/DL (ref 3.2–4.9)
ALBUMIN/GLOB SERPL: 1.5 G/DL
ALP SERPL-CCNC: 59 U/L (ref 30–99)
ALT SERPL-CCNC: 75 U/L (ref 2–50)
ANION GAP SERPL CALC-SCNC: 11 MMOL/L (ref 7–16)
AST SERPL-CCNC: 23 U/L (ref 12–45)
BASOPHILS # BLD AUTO: 0.2 % (ref 0–1.8)
BASOPHILS # BLD: 0.01 K/UL (ref 0–0.12)
BILIRUB SERPL-MCNC: 0.9 MG/DL (ref 0.1–1.5)
BUN SERPL-MCNC: 15 MG/DL (ref 8–22)
CALCIUM SERPL-MCNC: 7.9 MG/DL (ref 8.5–10.5)
CHLORIDE SERPL-SCNC: 108 MMOL/L (ref 96–112)
CHOLEST SERPL-MCNC: 91 MG/DL (ref 100–199)
CO2 SERPL-SCNC: 21 MMOL/L (ref 20–33)
CREAT SERPL-MCNC: 0.68 MG/DL (ref 0.5–1.4)
EOSINOPHIL # BLD AUTO: 0.18 K/UL (ref 0–0.51)
EOSINOPHIL NFR BLD: 3.3 % (ref 0–6.9)
ERYTHROCYTE [DISTWIDTH] IN BLOOD BY AUTOMATED COUNT: 37.2 FL (ref 35.9–50)
GLOBULIN SER CALC-MCNC: 2.4 G/DL (ref 1.9–3.5)
GLUCOSE SERPL-MCNC: 98 MG/DL (ref 65–99)
HCT VFR BLD AUTO: 41.5 % (ref 42–52)
HDLC SERPL-MCNC: 22 MG/DL
HGB BLD-MCNC: 13.6 G/DL (ref 14–18)
IMM GRANULOCYTES # BLD AUTO: 0.02 K/UL (ref 0–0.11)
IMM GRANULOCYTES NFR BLD AUTO: 0.4 % (ref 0–0.9)
LDLC SERPL CALC-MCNC: 53 MG/DL
LIPASE SERPL-CCNC: 172 U/L (ref 11–82)
LYMPHOCYTES # BLD AUTO: 0.77 K/UL (ref 1–4.8)
LYMPHOCYTES NFR BLD: 14 % (ref 22–41)
MCH RBC QN AUTO: 28.2 PG (ref 27–33)
MCHC RBC AUTO-ENTMCNC: 32.8 G/DL (ref 33.7–35.3)
MCV RBC AUTO: 85.9 FL (ref 81.4–97.8)
MONOCYTES # BLD AUTO: 0.44 K/UL (ref 0–0.85)
MONOCYTES NFR BLD AUTO: 8 % (ref 0–13.4)
NEUTROPHILS # BLD AUTO: 4.09 K/UL (ref 1.82–7.42)
NEUTROPHILS NFR BLD: 74.1 % (ref 44–72)
NRBC # BLD AUTO: 0 K/UL
NRBC BLD-RTO: 0 /100 WBC
PLATELET # BLD AUTO: 120 K/UL (ref 164–446)
PMV BLD AUTO: 10.7 FL (ref 9–12.9)
POTASSIUM SERPL-SCNC: 3.7 MMOL/L (ref 3.6–5.5)
PROT SERPL-MCNC: 6 G/DL (ref 6–8.2)
RBC # BLD AUTO: 4.83 M/UL (ref 4.7–6.1)
SODIUM SERPL-SCNC: 140 MMOL/L (ref 135–145)
TRIGL SERPL-MCNC: 80 MG/DL (ref 0–149)
WBC # BLD AUTO: 5.5 K/UL (ref 4.8–10.8)

## 2021-06-09 PROCEDURE — 99233 SBSQ HOSP IP/OBS HIGH 50: CPT | Performed by: NURSE PRACTITIONER

## 2021-06-09 PROCEDURE — 96366 THER/PROPH/DIAG IV INF ADDON: CPT

## 2021-06-09 PROCEDURE — 83690 ASSAY OF LIPASE: CPT

## 2021-06-09 PROCEDURE — 700105 HCHG RX REV CODE 258: Performed by: INTERNAL MEDICINE

## 2021-06-09 PROCEDURE — 80053 COMPREHEN METABOLIC PANEL: CPT

## 2021-06-09 PROCEDURE — 700111 HCHG RX REV CODE 636 W/ 250 OVERRIDE (IP): Performed by: INTERNAL MEDICINE

## 2021-06-09 PROCEDURE — 770006 HCHG ROOM/CARE - MED/SURG/GYN SEMI*

## 2021-06-09 PROCEDURE — 700111 HCHG RX REV CODE 636 W/ 250 OVERRIDE (IP): Performed by: NURSE PRACTITIONER

## 2021-06-09 PROCEDURE — 700105 HCHG RX REV CODE 258: Performed by: NURSE PRACTITIONER

## 2021-06-09 PROCEDURE — 80061 LIPID PANEL: CPT

## 2021-06-09 PROCEDURE — A9270 NON-COVERED ITEM OR SERVICE: HCPCS | Performed by: STUDENT IN AN ORGANIZED HEALTH CARE EDUCATION/TRAINING PROGRAM

## 2021-06-09 PROCEDURE — 85025 COMPLETE CBC W/AUTO DIFF WBC: CPT

## 2021-06-09 PROCEDURE — 87040 BLOOD CULTURE FOR BACTERIA: CPT

## 2021-06-09 PROCEDURE — 700102 HCHG RX REV CODE 250 W/ 637 OVERRIDE(OP): Performed by: STUDENT IN AN ORGANIZED HEALTH CARE EDUCATION/TRAINING PROGRAM

## 2021-06-09 PROCEDURE — 74181 MRI ABDOMEN W/O CONTRAST: CPT

## 2021-06-09 PROCEDURE — 96365 THER/PROPH/DIAG IV INF INIT: CPT

## 2021-06-09 RX ORDER — LORAZEPAM 2 MG/ML
1 INJECTION INTRAMUSCULAR ONCE
Status: COMPLETED | OUTPATIENT
Start: 2021-06-09 | End: 2021-06-09

## 2021-06-09 RX ADMIN — PIPERACILLIN AND TAZOBACTAM 3.38 G: 3; .375 INJECTION, POWDER, LYOPHILIZED, FOR SOLUTION INTRAVENOUS; PARENTERAL at 20:47

## 2021-06-09 RX ADMIN — PIPERACILLIN AND TAZOBACTAM 3.38 G: 3; .375 INJECTION, POWDER, LYOPHILIZED, FOR SOLUTION INTRAVENOUS; PARENTERAL at 05:27

## 2021-06-09 RX ADMIN — LEVETIRACETAM 500 MG: 500 TABLET ORAL at 17:35

## 2021-06-09 RX ADMIN — ASPIRIN 81 MG: 81 TABLET, COATED ORAL at 05:30

## 2021-06-09 RX ADMIN — LORAZEPAM 1 MG: 2 INJECTION INTRAMUSCULAR; INTRAVENOUS at 12:03

## 2021-06-09 RX ADMIN — PIPERACILLIN AND TAZOBACTAM 3.38 G: 3; .375 INJECTION, POWDER, LYOPHILIZED, FOR SOLUTION INTRAVENOUS; PARENTERAL at 13:19

## 2021-06-09 RX ADMIN — SODIUM CHLORIDE: 9 INJECTION, SOLUTION INTRAVENOUS at 00:38

## 2021-06-09 RX ADMIN — SODIUM CHLORIDE: 9 INJECTION, SOLUTION INTRAVENOUS at 09:21

## 2021-06-09 RX ADMIN — LEVETIRACETAM 500 MG: 500 TABLET ORAL at 05:30

## 2021-06-09 ASSESSMENT — ENCOUNTER SYMPTOMS
TINGLING: 0
FOCAL WEAKNESS: 0
ABDOMINAL PAIN: 1
COUGH: 0
MYALGIAS: 0
DOUBLE VISION: 0
BLURRED VISION: 0
VOMITING: 0
CHILLS: 0
TREMORS: 0
SHORTNESS OF BREATH: 0
WEAKNESS: 0
CONSTIPATION: 0
DIARRHEA: 0
NAUSEA: 0
PALPITATIONS: 0
SORE THROAT: 0
FEVER: 0
DIZZINESS: 0
HEADACHES: 0

## 2021-06-09 ASSESSMENT — PAIN DESCRIPTION - PAIN TYPE
TYPE: ACUTE PAIN
TYPE: ACUTE PAIN

## 2021-06-09 NOTE — CARE PLAN
The patient is Stable - Low risk of patient condition declining or worsening    Shift Goals  Patient Goals: Rest    Progress made toward(s) clinical / shift goals:      Problem: Knowledge Deficit - Standard  Goal: Patient and family/care givers will demonstrate understanding of plan of care, disease process/condition, diagnostic tests and medications  Outcome: Progressing     Problem: Pain - Standard  Goal: Alleviation of pain or a reduction in pain to the patient’s comfort goal  Outcome: Progressing       Patient is not progressing towards the following goals:

## 2021-06-09 NOTE — THERAPY
Physical Therapy Contact Note    PT consult received and acknowledged. Patient reported no concerns with functional mobility or returning home following resolution of acute abdominal pain. He reported he is at baseline functional mobility. Provided education regarding pathology of bedrest, patient verbalized understanding. No acute PT indicated.    Iris Schwarz, PT, DPT  [phone]       no

## 2021-06-09 NOTE — RESPIRATORY CARE
COPD EDUCATION by COPD CLINICAL EDUCATOR  6/9/2021 at 4:55 PM by Melissa Conner, RRT     Patient interviewed by COPD education team. Patient denied COPD. Has NALLELY and quit smoking >20 years ago.

## 2021-06-09 NOTE — PROGRESS NOTES
Hospital Medicine Daily Progress Note    Date of Service  6/9/2021    Chief Complaint  60 y.o. male admitted 6/7/2021 with abdominal pain.     Hospital Course  This is a 60-year-old male admitted with epigastric abdominal pain found to have acute pancreatitis with elevated lipase and inflammatory pancreatic changes noted on CT abdomen.  There is also noted to be borderline gallbladder wall thickening with some pericholecystic fluid and no evidence of gallstones on CT.  MRCP pending.  The patient's pain is progressively improving.  We will continue bowel rest for now.  Advance diet as tolerated.  Remains afebrile stable vital signs. Continue pain control.     Addendum:  Patient noted to have BC growing GNR.  Initiated zosyn.  Clinically appears stable.  Stat MRCP.      Interval Problem Update  6/9:  BC growing E. Coli.  Sensitivities pending.  MRCP delayed secondary to PM.  Scheduled for today.  Clinically much improved.  Tolerated CLD.  No abdominal tenderness.  Afebrile.  VSS.    Consultants/Specialty  N/A    Code Status  Full Code    Disposition  TBD.     Review of Systems  Review of Systems   Constitutional: Negative for chills, fever and malaise/fatigue.   HENT: Negative for congestion and sore throat.    Eyes: Negative for blurred vision and double vision.   Respiratory: Negative for cough and shortness of breath.    Cardiovascular: Negative for chest pain, palpitations and leg swelling.   Gastrointestinal: Positive for abdominal pain. Negative for constipation, diarrhea, nausea and vomiting.   Genitourinary: Negative for dysuria.   Musculoskeletal: Negative for joint pain and myalgias.   Skin: Negative for itching and rash.   Neurological: Negative for dizziness, tingling, tremors, focal weakness, weakness and headaches.        Physical Exam  Temp:  [36.7 °C (98 °F)-37.9 °C (100.3 °F)] 36.7 °C (98 °F)  Pulse:  [63-72] 66  Resp:  [16-18] 16  BP: (103-144)/(55-71) 144/71  SpO2:  [92 %-93 %] 92 %    Physical  Exam  Vitals and nursing note reviewed.   Constitutional:       General: He is not in acute distress.     Appearance: Normal appearance. He is not ill-appearing.   HENT:      Head: Normocephalic and atraumatic.      Nose: Nose normal. No congestion.      Mouth/Throat:      Mouth: Mucous membranes are moist.      Pharynx: Oropharynx is clear. No oropharyngeal exudate.   Eyes:      General:         Right eye: No discharge.         Left eye: No discharge.      Conjunctiva/sclera: Conjunctivae normal.   Cardiovascular:      Rate and Rhythm: Normal rate and regular rhythm.      Pulses: Normal pulses.      Heart sounds: Normal heart sounds.   Pulmonary:      Effort: Pulmonary effort is normal. No respiratory distress.      Breath sounds: Normal breath sounds. No wheezing or rales.   Abdominal:      General: Bowel sounds are normal. There is no distension.      Palpations: Abdomen is soft.      Tenderness: There is no abdominal tenderness. There is no guarding.   Musculoskeletal:         General: Normal range of motion.      Cervical back: Normal range of motion and neck supple. No rigidity. No muscular tenderness.      Right lower leg: No edema.      Left lower leg: No edema.   Skin:     General: Skin is warm and dry.      Coloration: Skin is not jaundiced or pale.   Neurological:      General: No focal deficit present.      Mental Status: He is alert and oriented to person, place, and time. Mental status is at baseline.      Cranial Nerves: No cranial nerve deficit.   Psychiatric:         Mood and Affect: Mood normal.         Behavior: Behavior normal.         Thought Content: Thought content normal.         Judgment: Judgment normal.         Fluids    Intake/Output Summary (Last 24 hours) at 6/9/2021 1208  Last data filed at 6/9/2021 0047  Gross per 24 hour   Intake 1075.42 ml   Output 2 ml   Net 1073.42 ml       Laboratory  Recent Labs     06/07/21  1950 06/08/21  0845 06/09/21  0520   WBC 11.2* 7.0 5.5   RBC 5.66 4.90  4.83   HEMOGLOBIN 16.1 14.0 13.6*   HEMATOCRIT 49.1 42.6 41.5*   MCV 86.7 86.9 85.9   MCH 28.4 28.6 28.2   MCHC 32.8* 32.9* 32.8*   RDW 38.5 38.5 37.2   PLATELETCT 171 134* 120*   MPV 11.0 10.6 10.7     Recent Labs     06/07/21  1950 06/09/21  0520   SODIUM 138 140   POTASSIUM 4.4 3.7   CHLORIDE 104 108   CO2 23 21   GLUCOSE 106* 98   BUN 24* 15   CREATININE 0.77 0.68   CALCIUM 9.5 7.9*     Recent Labs     06/07/21  2133   APTT 30.6   INR 1.15*         Recent Labs     06/09/21  0520   TRIGLYCERIDE 80   HDL 22*   LDL 53       Imaging  CT-ABDOMEN-PELVIS WITH   Final Result      1.  Mild inflammation about the head of the pancreas consistent with pancreatitis.      2.  Hepatic steatosis.      3.  Suggestion of borderline gallbladder wall thickening with some pericholecystic fluid without evidence of gallstones.      4.  Diverticulosis without evidence of diverticulitis.      US-RUQ   Final Result      1.  Hepatic steatosis.      2.  Limited evaluation of the pancreas is grossly unremarkable.      3.  No evidence of gallstones.      4.  Borderline aneurysmal dilatation of the proximal abdominal aorta 3.2 cm.      VJ-XELLEOX-T/O    (Results Pending)        Assessment/Plan  Bacteremia  Assessment & Plan  BC growing E. Coli.  Pending sensitivities.  Clinically improving.   Suspect GI source  Initiate zosyn  MRCP pending.         Bilirubinemia- (present on admission)  Assessment & Plan  Total Bilirubin 2.4  Continue to monitor    Transaminitis- (present on admission)  Assessment & Plan  AST/ALT: 143/181  Continue to monitor    HTN (hypertension)- (present on admission)  Assessment & Plan  Continue home meds  Continue to monitor      Pancreatitis- (present on admission)  Assessment & Plan  Lipase 905 on admission  Improving  MRCP pending  Continue antibx and IV hydration  Advance diet as tolerated.     Seizure (HCC)  Assessment & Plan  History of seizure disorder  Keppra BID, follows with neurology  Seizure precautions in  place  Most recent seizure 2 years ago per patient    HLD (hyperlipidemia)  Assessment & Plan  Lipid panel wnl.  Continue home statin       VTE prophylaxis: lovenox.

## 2021-06-09 NOTE — PROGRESS NOTES
Received bedside report and assumed care of patient. Patient is A&Ox4 and awake in bed. Patient showing no signs of distress, no complaints of pain, and is on RA. Call light within reach, bed in low position, 2 side rails up, and belongings are within reach.  Patient was updated on Plan of Care. Pt educated on use of call light for assistance.
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
## 2021-06-09 NOTE — CARE PLAN
Problem: Knowledge Deficit - Standard  Goal: Patient and family/care givers will demonstrate understanding of plan of care, disease process/condition, diagnostic tests and medications  Outcome: Progressing     Problem: Pain - Standard  Goal: Alleviation of pain or a reduction in pain to the patient’s comfort goal  Outcome: Progressing   The patient is Stable - Low risk of patient condition declining or worsening    Shift Goals  Clinical Goals: mri  Patient Goals: rest    Progress made toward(s) clinical / shift goals:  mri completed, iv abx in place    Patient is not progressing towards the following goals:

## 2021-06-09 NOTE — ASSESSMENT & PLAN NOTE
Presented with pancreatitis  Neg US for stones  LFTs improving  Bacteremia  MRCP shows acute whintey with sludge  6/10 Lap whitney

## 2021-06-09 NOTE — PROGRESS NOTES
Pt's PPM set to MRI safe mode by rep prior to MRI. During MRI scan, pt monitored with ECG, BP and SPO2. Pt tolerated scan well. Returned to floor via w/c by escort  after device reset to pre-MRI mode by rep

## 2021-06-10 ENCOUNTER — ANESTHESIA EVENT (OUTPATIENT)
Dept: SURGERY | Facility: MEDICAL CENTER | Age: 61
DRG: 417 | End: 2021-06-10

## 2021-06-10 ENCOUNTER — ANESTHESIA (OUTPATIENT)
Dept: SURGERY | Facility: MEDICAL CENTER | Age: 61
DRG: 417 | End: 2021-06-10

## 2021-06-10 PROBLEM — K72.00: Status: RESOLVED | Noted: 2021-06-10 | Resolved: 2021-06-10

## 2021-06-10 PROBLEM — A41.51: Status: RESOLVED | Noted: 2021-06-10 | Resolved: 2021-06-10

## 2021-06-10 PROBLEM — R65.20: Status: RESOLVED | Noted: 2021-06-10 | Resolved: 2021-06-10

## 2021-06-10 PROBLEM — K85.10 ACUTE BILIARY PANCREATITIS WITHOUT INFECTION OR NECROSIS: Status: ACTIVE | Noted: 2021-06-08

## 2021-06-10 PROBLEM — K72.00: Status: ACTIVE | Noted: 2021-06-10

## 2021-06-10 PROBLEM — A41.51: Status: ACTIVE | Noted: 2021-06-10

## 2021-06-10 PROBLEM — K81.0 ACUTE CHOLECYSTITIS: Status: ACTIVE | Noted: 2021-06-10

## 2021-06-10 PROBLEM — R65.20: Status: ACTIVE | Noted: 2021-06-10

## 2021-06-10 LAB
ALBUMIN SERPL BCP-MCNC: 3.7 G/DL (ref 3.2–4.9)
ALBUMIN/GLOB SERPL: 1.8 G/DL
ALP SERPL-CCNC: 58 U/L (ref 30–99)
ALT SERPL-CCNC: 55 U/L (ref 2–50)
ANION GAP SERPL CALC-SCNC: 9 MMOL/L (ref 7–16)
AST SERPL-CCNC: 22 U/L (ref 12–45)
BACTERIA BLD CULT: ABNORMAL
BASOPHILS # BLD AUTO: 0.5 % (ref 0–1.8)
BASOPHILS # BLD: 0.02 K/UL (ref 0–0.12)
BILIRUB SERPL-MCNC: 0.9 MG/DL (ref 0.1–1.5)
BUN SERPL-MCNC: 11 MG/DL (ref 8–22)
CALCIUM SERPL-MCNC: 7.9 MG/DL (ref 8.5–10.5)
CHLORIDE SERPL-SCNC: 109 MMOL/L (ref 96–112)
CO2 SERPL-SCNC: 24 MMOL/L (ref 20–33)
CREAT SERPL-MCNC: 0.72 MG/DL (ref 0.5–1.4)
EKG IMPRESSION: NORMAL
EOSINOPHIL # BLD AUTO: 0.24 K/UL (ref 0–0.51)
EOSINOPHIL NFR BLD: 5.8 % (ref 0–6.9)
ERYTHROCYTE [DISTWIDTH] IN BLOOD BY AUTOMATED COUNT: 37.3 FL (ref 35.9–50)
GLOBULIN SER CALC-MCNC: 2.1 G/DL (ref 1.9–3.5)
GLUCOSE SERPL-MCNC: 98 MG/DL (ref 65–99)
HCT VFR BLD AUTO: 39.8 % (ref 42–52)
HGB BLD-MCNC: 13 G/DL (ref 14–18)
IMM GRANULOCYTES # BLD AUTO: 0.02 K/UL (ref 0–0.11)
IMM GRANULOCYTES NFR BLD AUTO: 0.5 % (ref 0–0.9)
LYMPHOCYTES # BLD AUTO: 0.81 K/UL (ref 1–4.8)
LYMPHOCYTES NFR BLD: 19.5 % (ref 22–41)
MCH RBC QN AUTO: 28.3 PG (ref 27–33)
MCHC RBC AUTO-ENTMCNC: 32.7 G/DL (ref 33.7–35.3)
MCV RBC AUTO: 86.5 FL (ref 81.4–97.8)
MONOCYTES # BLD AUTO: 0.43 K/UL (ref 0–0.85)
MONOCYTES NFR BLD AUTO: 10.3 % (ref 0–13.4)
NEUTROPHILS # BLD AUTO: 2.64 K/UL (ref 1.82–7.42)
NEUTROPHILS NFR BLD: 63.4 % (ref 44–72)
NRBC # BLD AUTO: 0 K/UL
NRBC BLD-RTO: 0 /100 WBC
PATHOLOGY CONSULT NOTE: NORMAL
PLATELET # BLD AUTO: 148 K/UL (ref 164–446)
PMV BLD AUTO: 11.1 FL (ref 9–12.9)
POTASSIUM SERPL-SCNC: 4.2 MMOL/L (ref 3.6–5.5)
PROT SERPL-MCNC: 5.8 G/DL (ref 6–8.2)
RBC # BLD AUTO: 4.6 M/UL (ref 4.7–6.1)
SIGNIFICANT IND 70042: ABNORMAL
SIGNIFICANT IND 70042: ABNORMAL
SITE SITE: ABNORMAL
SITE SITE: ABNORMAL
SODIUM SERPL-SCNC: 142 MMOL/L (ref 135–145)
SOURCE SOURCE: ABNORMAL
SOURCE SOURCE: ABNORMAL
WBC # BLD AUTO: 4.2 K/UL (ref 4.8–10.8)

## 2021-06-10 PROCEDURE — 500868 HCHG NEEDLE, SURGI(VARES): Performed by: SURGERY

## 2021-06-10 PROCEDURE — 160036 HCHG PACU - EA ADDL 30 MINS PHASE I: Performed by: SURGERY

## 2021-06-10 PROCEDURE — 700111 HCHG RX REV CODE 636 W/ 250 OVERRIDE (IP): Performed by: ANESTHESIOLOGY

## 2021-06-10 PROCEDURE — 501583 HCHG TROCAR, THRD CAN&SEAL 5X100: Performed by: SURGERY

## 2021-06-10 PROCEDURE — 0WQF0ZZ REPAIR ABDOMINAL WALL, OPEN APPROACH: ICD-10-PCS | Performed by: SURGERY

## 2021-06-10 PROCEDURE — 770006 HCHG ROOM/CARE - MED/SURG/GYN SEMI*

## 2021-06-10 PROCEDURE — 160041 HCHG SURGERY MINUTES - EA ADDL 1 MIN LEVEL 4: Performed by: SURGERY

## 2021-06-10 PROCEDURE — 93010 ELECTROCARDIOGRAM REPORT: CPT | Performed by: INTERNAL MEDICINE

## 2021-06-10 PROCEDURE — 160035 HCHG PACU - 1ST 60 MINS PHASE I: Performed by: SURGERY

## 2021-06-10 PROCEDURE — 160048 HCHG OR STATISTICAL LEVEL 1-5: Performed by: SURGERY

## 2021-06-10 PROCEDURE — 0FT44ZZ RESECTION OF GALLBLADDER, PERCUTANEOUS ENDOSCOPIC APPROACH: ICD-10-PCS | Performed by: SURGERY

## 2021-06-10 PROCEDURE — 700105 HCHG RX REV CODE 258: Performed by: NURSE PRACTITIONER

## 2021-06-10 PROCEDURE — 501586 HCHG TROCAR, THRD SPIKE 5X55: Performed by: SURGERY

## 2021-06-10 PROCEDURE — 160029 HCHG SURGERY MINUTES - 1ST 30 MINS LEVEL 4: Performed by: SURGERY

## 2021-06-10 PROCEDURE — 93005 ELECTROCARDIOGRAM TRACING: CPT | Performed by: SURGERY

## 2021-06-10 PROCEDURE — 700101 HCHG RX REV CODE 250: Performed by: ANESTHESIOLOGY

## 2021-06-10 PROCEDURE — 36415 COLL VENOUS BLD VENIPUNCTURE: CPT

## 2021-06-10 PROCEDURE — 700111 HCHG RX REV CODE 636 W/ 250 OVERRIDE (IP): Performed by: SURGERY

## 2021-06-10 PROCEDURE — 501838 HCHG SUTURE GENERAL: Performed by: SURGERY

## 2021-06-10 PROCEDURE — 700111 HCHG RX REV CODE 636 W/ 250 OVERRIDE (IP)

## 2021-06-10 PROCEDURE — 500514 HCHG ENDOCLIP: Performed by: SURGERY

## 2021-06-10 PROCEDURE — 85025 COMPLETE CBC W/AUTO DIFF WBC: CPT

## 2021-06-10 PROCEDURE — A9270 NON-COVERED ITEM OR SERVICE: HCPCS | Performed by: STUDENT IN AN ORGANIZED HEALTH CARE EDUCATION/TRAINING PROGRAM

## 2021-06-10 PROCEDURE — A9270 NON-COVERED ITEM OR SERVICE: HCPCS | Performed by: ANESTHESIOLOGY

## 2021-06-10 PROCEDURE — 700102 HCHG RX REV CODE 250 W/ 637 OVERRIDE(OP): Performed by: STUDENT IN AN ORGANIZED HEALTH CARE EDUCATION/TRAINING PROGRAM

## 2021-06-10 PROCEDURE — 80053 COMPREHEN METABOLIC PANEL: CPT

## 2021-06-10 PROCEDURE — 88304 TISSUE EXAM BY PATHOLOGIST: CPT

## 2021-06-10 PROCEDURE — 700105 HCHG RX REV CODE 258: Performed by: ANESTHESIOLOGY

## 2021-06-10 PROCEDURE — 99232 SBSQ HOSP IP/OBS MODERATE 35: CPT | Performed by: INTERNAL MEDICINE

## 2021-06-10 PROCEDURE — 502571 HCHG PACK, LAP CHOLE: Performed by: SURGERY

## 2021-06-10 PROCEDURE — 160009 HCHG ANES TIME/MIN: Performed by: SURGERY

## 2021-06-10 PROCEDURE — 700101 HCHG RX REV CODE 250: Performed by: INTERNAL MEDICINE

## 2021-06-10 PROCEDURE — 501572 HCHG TROCAR, SHIELD OBTU 5X100: Performed by: SURGERY

## 2021-06-10 PROCEDURE — 700111 HCHG RX REV CODE 636 W/ 250 OVERRIDE (IP): Performed by: INTERNAL MEDICINE

## 2021-06-10 PROCEDURE — 160002 HCHG RECOVERY MINUTES (STAT): Performed by: SURGERY

## 2021-06-10 PROCEDURE — 501574 HCHG TROCAR, SMTH CAN&SEAL 5: Performed by: SURGERY

## 2021-06-10 PROCEDURE — 700102 HCHG RX REV CODE 250 W/ 637 OVERRIDE(OP): Performed by: ANESTHESIOLOGY

## 2021-06-10 PROCEDURE — 700105 HCHG RX REV CODE 258: Performed by: INTERNAL MEDICINE

## 2021-06-10 RX ORDER — HYDROMORPHONE HYDROCHLORIDE 1 MG/ML
0.1 INJECTION, SOLUTION INTRAMUSCULAR; INTRAVENOUS; SUBCUTANEOUS
Status: DISCONTINUED | OUTPATIENT
Start: 2021-06-10 | End: 2021-06-10 | Stop reason: HOSPADM

## 2021-06-10 RX ORDER — ONDANSETRON 2 MG/ML
4 INJECTION INTRAMUSCULAR; INTRAVENOUS
Status: DISCONTINUED | OUTPATIENT
Start: 2021-06-10 | End: 2021-06-10 | Stop reason: HOSPADM

## 2021-06-10 RX ORDER — OXYCODONE HCL 5 MG/5 ML
10 SOLUTION, ORAL ORAL
Status: COMPLETED | OUTPATIENT
Start: 2021-06-10 | End: 2021-06-10

## 2021-06-10 RX ORDER — MEPERIDINE HYDROCHLORIDE 25 MG/ML
6.25 INJECTION INTRAMUSCULAR; INTRAVENOUS; SUBCUTANEOUS
Status: DISCONTINUED | OUTPATIENT
Start: 2021-06-10 | End: 2021-06-10 | Stop reason: HOSPADM

## 2021-06-10 RX ORDER — BUPIVACAINE HYDROCHLORIDE 2.5 MG/ML
INJECTION, SOLUTION EPIDURAL; INFILTRATION; INTRACAUDAL
Status: DISCONTINUED | OUTPATIENT
Start: 2021-06-10 | End: 2021-06-10 | Stop reason: HOSPADM

## 2021-06-10 RX ORDER — SODIUM CHLORIDE, SODIUM LACTATE, POTASSIUM CHLORIDE, CALCIUM CHLORIDE 600; 310; 30; 20 MG/100ML; MG/100ML; MG/100ML; MG/100ML
INJECTION, SOLUTION INTRAVENOUS
Status: DISCONTINUED | OUTPATIENT
Start: 2021-06-10 | End: 2021-06-10 | Stop reason: SURG

## 2021-06-10 RX ORDER — LIDOCAINE HYDROCHLORIDE 20 MG/ML
INJECTION, SOLUTION EPIDURAL; INFILTRATION; INTRACAUDAL; PERINEURAL PRN
Status: DISCONTINUED | OUTPATIENT
Start: 2021-06-10 | End: 2021-06-10 | Stop reason: SURG

## 2021-06-10 RX ORDER — MORPHINE SULFATE 10 MG/ML
4 INJECTION, SOLUTION INTRAMUSCULAR; INTRAVENOUS
Status: DISCONTINUED | OUTPATIENT
Start: 2021-06-10 | End: 2021-06-13 | Stop reason: HOSPADM

## 2021-06-10 RX ORDER — OXYCODONE HCL 5 MG/5 ML
5 SOLUTION, ORAL ORAL
Status: COMPLETED | OUTPATIENT
Start: 2021-06-10 | End: 2021-06-10

## 2021-06-10 RX ORDER — HALOPERIDOL 5 MG/ML
1 INJECTION INTRAMUSCULAR
Status: DISCONTINUED | OUTPATIENT
Start: 2021-06-10 | End: 2021-06-10 | Stop reason: HOSPADM

## 2021-06-10 RX ORDER — DEXMEDETOMIDINE HYDROCHLORIDE 100 UG/ML
INJECTION, SOLUTION INTRAVENOUS PRN
Status: DISCONTINUED | OUTPATIENT
Start: 2021-06-10 | End: 2021-06-10 | Stop reason: SURG

## 2021-06-10 RX ORDER — SODIUM CHLORIDE, SODIUM LACTATE, POTASSIUM CHLORIDE, CALCIUM CHLORIDE 600; 310; 30; 20 MG/100ML; MG/100ML; MG/100ML; MG/100ML
INJECTION, SOLUTION INTRAVENOUS CONTINUOUS
Status: DISCONTINUED | OUTPATIENT
Start: 2021-06-10 | End: 2021-06-10 | Stop reason: HOSPADM

## 2021-06-10 RX ORDER — LORAZEPAM 2 MG/ML
0.5 INJECTION INTRAMUSCULAR
Status: DISCONTINUED | OUTPATIENT
Start: 2021-06-10 | End: 2021-06-10 | Stop reason: HOSPADM

## 2021-06-10 RX ORDER — HYDROMORPHONE HYDROCHLORIDE 1 MG/ML
0.4 INJECTION, SOLUTION INTRAMUSCULAR; INTRAVENOUS; SUBCUTANEOUS
Status: DISCONTINUED | OUTPATIENT
Start: 2021-06-10 | End: 2021-06-10 | Stop reason: HOSPADM

## 2021-06-10 RX ORDER — HYDROMORPHONE HYDROCHLORIDE 1 MG/ML
0.2 INJECTION, SOLUTION INTRAMUSCULAR; INTRAVENOUS; SUBCUTANEOUS
Status: DISCONTINUED | OUTPATIENT
Start: 2021-06-10 | End: 2021-06-10 | Stop reason: HOSPADM

## 2021-06-10 RX ORDER — DIPHENHYDRAMINE HYDROCHLORIDE 50 MG/ML
12.5 INJECTION INTRAMUSCULAR; INTRAVENOUS
Status: DISCONTINUED | OUTPATIENT
Start: 2021-06-10 | End: 2021-06-10 | Stop reason: HOSPADM

## 2021-06-10 RX ORDER — OXYCODONE HYDROCHLORIDE 5 MG/1
5 TABLET ORAL EVERY 4 HOURS PRN
Status: DISCONTINUED | OUTPATIENT
Start: 2021-06-10 | End: 2021-06-13 | Stop reason: HOSPADM

## 2021-06-10 RX ORDER — CEFOTETAN DISODIUM 2 G/20ML
INJECTION, POWDER, FOR SOLUTION INTRAMUSCULAR; INTRAVENOUS PRN
Status: DISCONTINUED | OUTPATIENT
Start: 2021-06-10 | End: 2021-06-10 | Stop reason: SURG

## 2021-06-10 RX ADMIN — LEVETIRACETAM 500 MG: 500 TABLET ORAL at 17:27

## 2021-06-10 RX ADMIN — FENTANYL CITRATE 25 MCG: 50 INJECTION, SOLUTION INTRAMUSCULAR; INTRAVENOUS at 16:22

## 2021-06-10 RX ADMIN — MIDAZOLAM 2 MG: 1 INJECTION INTRAMUSCULAR; INTRAVENOUS at 15:34

## 2021-06-10 RX ADMIN — SODIUM CHLORIDE, POTASSIUM CHLORIDE, SODIUM LACTATE AND CALCIUM CHLORIDE: 600; 310; 30; 20 INJECTION, SOLUTION INTRAVENOUS at 14:36

## 2021-06-10 RX ADMIN — FENTANYL CITRATE 50 MCG: 50 INJECTION, SOLUTION INTRAMUSCULAR; INTRAVENOUS at 14:56

## 2021-06-10 RX ADMIN — CEFOTETAN DISODIUM 2 G: 2 INJECTION, POWDER, FOR SOLUTION INTRAMUSCULAR; INTRAVENOUS at 14:41

## 2021-06-10 RX ADMIN — PIPERACILLIN AND TAZOBACTAM 3.38 G: 3; .375 INJECTION, POWDER, LYOPHILIZED, FOR SOLUTION INTRAVENOUS; PARENTERAL at 05:11

## 2021-06-10 RX ADMIN — ROCURONIUM BROMIDE 50 MG: 10 INJECTION, SOLUTION INTRAVENOUS at 14:39

## 2021-06-10 RX ADMIN — LIDOCAINE HYDROCHLORIDE 100 MG: 20 INJECTION, SOLUTION EPIDURAL; INFILTRATION; INTRACAUDAL at 14:39

## 2021-06-10 RX ADMIN — FENTANYL CITRATE 25 MCG: 50 INJECTION, SOLUTION INTRAMUSCULAR; INTRAVENOUS at 16:35

## 2021-06-10 RX ADMIN — PROPOFOL 200 MG: 10 INJECTION, EMULSION INTRAVENOUS at 14:39

## 2021-06-10 RX ADMIN — SODIUM CHLORIDE: 9 INJECTION, SOLUTION INTRAVENOUS at 01:48

## 2021-06-10 RX ADMIN — DEXMEDETOMIDINE 20 MCG: 200 INJECTION, SOLUTION INTRAVENOUS at 15:41

## 2021-06-10 RX ADMIN — SUGAMMADEX 200 MG: 100 INJECTION, SOLUTION INTRAVENOUS at 15:24

## 2021-06-10 RX ADMIN — OXYCODONE HYDROCHLORIDE 10 MG: 5 SOLUTION ORAL at 16:12

## 2021-06-10 RX ADMIN — SODIUM CHLORIDE: 9 INJECTION, SOLUTION INTRAVENOUS at 22:34

## 2021-06-10 RX ADMIN — DEXMEDETOMIDINE 20 MCG: 200 INJECTION, SOLUTION INTRAVENOUS at 15:45

## 2021-06-10 RX ADMIN — CEFTRIAXONE SODIUM 2 G: 10 INJECTION, POWDER, FOR SOLUTION INTRAVENOUS at 17:27

## 2021-06-10 RX ADMIN — LEVETIRACETAM 500 MG: 500 TABLET ORAL at 08:17

## 2021-06-10 RX ADMIN — FENTANYL CITRATE 50 MCG: 50 INJECTION, SOLUTION INTRAMUSCULAR; INTRAVENOUS at 14:39

## 2021-06-10 RX ADMIN — FENTANYL CITRATE 50 MCG: 50 INJECTION, SOLUTION INTRAMUSCULAR; INTRAVENOUS at 15:53

## 2021-06-10 RX ADMIN — MIDAZOLAM 2 MG: 1 INJECTION INTRAMUSCULAR; INTRAVENOUS at 14:36

## 2021-06-10 ASSESSMENT — ENCOUNTER SYMPTOMS
HEADACHES: 0
NAUSEA: 0
DIARRHEA: 0
FEVER: 0
VOMITING: 0
SHORTNESS OF BREATH: 0
DIZZINESS: 0
CONSTIPATION: 0
CHILLS: 0
BACK PAIN: 0
ABDOMINAL PAIN: 0
COUGH: 0
PALPITATIONS: 0

## 2021-06-10 ASSESSMENT — PAIN DESCRIPTION - PAIN TYPE
TYPE: ACUTE PAIN
TYPE: SURGICAL PAIN
TYPE: SURGICAL PAIN

## 2021-06-10 ASSESSMENT — PAIN SCALES - GENERAL: PAIN_LEVEL: 0

## 2021-06-10 NOTE — ANESTHESIA PREPROCEDURE EVALUATION
61yo male with CAD, COPD, sleep apnea (no CPAP) h/o seizures  Now for lap whitney    Relevant Problems   ANESTHESIA   (positive) Sleep apnea      NEURO   (positive) Seizure (HCC)      CARDIAC   (positive) Cardiac pacemaker in situ   (positive) HTN (hypertension)   (positive) History of severe aortic stenosis   (positive) History of thoracic aortic aneurysm without rupture (HCC)   (positive) Mild luminal irregularities probably less than 10% on cardiac catheterization 3/2017   (positive) Sick sinus syndrome (HCC)         (positive) Fatty liver       Physical Exam    Airway   Mallampati: II  TM distance: >3 FB  Neck ROM: full       Cardiovascular - normal exam  Rhythm: regular  Rate: normal  (-) murmur     Dental - normal exam  (+) upper dentures, lower dentures           Pulmonary - normal exam  Breath sounds clear to auscultation     Abdominal - normal exam     Neurological - normal exam                 Anesthesia Plan    ASA 3   ASA physical status 3 criteria: implanted pacemaker    Plan - general       Airway plan will be ETT          Induction: intravenous    Postoperative Plan: Postoperative administration of opioids is intended.    Pertinent diagnostic labs and testing reviewed    Informed Consent:    Anesthetic plan and risks discussed with patient.    Use of blood products discussed with: patient whom consented to blood products.

## 2021-06-10 NOTE — OR NURSING
Patient now calm, restful and fully oriented apologising for confused behavior. VS's stable, error in sats probe readings, exchanged to right ear  Lobe with sats now 100%.

## 2021-06-10 NOTE — PROGRESS NOTES
Assessment/description of ears?  Pink, blanching, intact  Which preventative measures are in place for the ears? Patient is ambulatory.    Assessment/description of elbows? Pink, blanching, intact  Which preventative measures are in place for the elbows? Patient is ambulatory    Assessment/description of sacrum? Pink, blanching, circular, open, redness to right buttock  Which preventative measures are in place for the sacrum? Patient is ambulatory    Assessment/description of heels? Pink, blanching, intact  Which preventative measures are in place for the heels? Patient is ambulatory    Which devices are in place? PIV  Description of skin under devices: intact  Which preventative measures are in place under devices? n/a    Other: small scatch under left nare from patient shaving

## 2021-06-10 NOTE — PROGRESS NOTES
[facility] Medicine Daily Progress Note    Date of Service  6/10/2021    Chief Complaint  60 y.o. male admitted 6/7/2021 with abdominal pain    Hospital Course  60-year-old male with a past medical history of hyperlipidemia, COPD, CAD, thoracic aortic aneurysm, seizures presenting 6/7/2021 for worsening epigastric pain and nausea.  Patient noted this pain after he ate breakfast and the admission.  In the ED patient was found to have elevated LFTs, total bilirubin 2.4, lipase 905, temperature 100.9 °F, appeared septic but did not meet full criteria, CT scan showed gallbladder wall thickening, right upper quadrant ultrasound did not show acute cholecystitis.  MRCP showed cholelithiasis and sludge with gallbladder wall thickening, suggestive of acute cholecystitis as well as inflammation of the pancreatic head.  General surgery was consulted and pending cholecystectomy.    Interval Problem Update  6/10/2021-patient seen and evaluated at bedside.  Patient stated his abdominal pain was under control at this time.  Patient was scheduled for laparoscopic cholecystectomy today with general surgery.  Patient continued on IV antibiotics due to E. coli which came back sensitive for ceftriaxone.  Switched IV Zosyn to ceftriaxone.    Consultants/Specialty  General surgery    Code Status  Full Code    Disposition  Likely discharge in the next 24 to 48 hours, if patient remains afebrile.    Review of Systems  Review of Systems   Constitutional: Negative for chills, fever and malaise/fatigue.   Respiratory: Negative for cough and shortness of breath.    Cardiovascular: Negative for chest pain and palpitations.   Gastrointestinal: Negative for abdominal pain, constipation, diarrhea, nausea and vomiting.   Musculoskeletal: Negative for back pain and joint pain.   Neurological: Negative for dizziness and headaches.   All other systems reviewed and are negative.     Physical Exam  Temp:  [36.1 °C (97 °F)-37.4 °C (99.3 °F)] 36.2 °C (97.2  °F)  Pulse:  [60-68] 62  Resp:  [16-17] 16  BP: (105-121)/(56-81) 121/71  SpO2:  [91 %-97 %] 97 %    Physical Exam  Vitals and nursing note reviewed.   Constitutional:       General: He is not in acute distress.     Appearance: He is obese. He is not diaphoretic.   Cardiovascular:      Rate and Rhythm: Normal rate and regular rhythm.      Pulses: Normal pulses.      Heart sounds: Normal heart sounds. No murmur heard.     Pulmonary:      Effort: Pulmonary effort is normal. No respiratory distress.      Breath sounds: Normal breath sounds. No wheezing or rales.   Abdominal:      General: Abdomen is flat. Bowel sounds are normal. There is no distension.      Palpations: Abdomen is soft.      Tenderness: There is no abdominal tenderness.   Musculoskeletal:         General: No swelling or tenderness. Normal range of motion.   Skin:     General: Skin is warm.      Capillary Refill: Capillary refill takes less than 2 seconds.      Coloration: Skin is not jaundiced or pale.   Neurological:      General: No focal deficit present.      Mental Status: He is alert and oriented to person, place, and time. Mental status is at baseline.   Psychiatric:         Mood and Affect: Mood normal.         Behavior: Behavior normal.         Thought Content: Thought content normal.         Judgment: Judgment normal.         Fluids    Intake/Output Summary (Last 24 hours) at 6/10/2021 1518  Last data filed at 6/10/2021 0911  Gross per 24 hour   Intake 100 ml   Output --   Net 100 ml       Laboratory  Recent Labs     06/08/21  0845 06/09/21  0520 06/10/21  0445   WBC 7.0 5.5 4.2*   RBC 4.90 4.83 4.60*   HEMOGLOBIN 14.0 13.6* 13.0*   HEMATOCRIT 42.6 41.5* 39.8*   MCV 86.9 85.9 86.5   MCH 28.6 28.2 28.3   MCHC 32.9* 32.8* 32.7*   RDW 38.5 37.2 37.3   PLATELETCT 134* 120* 148*   MPV 10.6 10.7 11.1     Recent Labs     06/07/21  1950 06/09/21  0520 06/10/21  0445   SODIUM 138 140 142   POTASSIUM 4.4 3.7 4.2   CHLORIDE 104 108 109   CO2 23 21 24    GLUCOSE 106* 98 98   BUN 24* 15 11   CREATININE 0.77 0.68 0.72   CALCIUM 9.5 7.9* 7.9*     Recent Labs     06/07/21  2133   APTT 30.6   INR 1.15*         Recent Labs     06/09/21  0520   TRIGLYCERIDE 80   HDL 22*   LDL 53       Imaging  [id number]   Final Result      1.  Cholelithiasis and sludge within the gallbladder with wall thickening. Findings are suggestive of acute cholecystitis in the appropriate clinical setting.      2.  No biliary obstruction or choledocholithiasis identified.      3.  Inflammation surrounding the pancreatic head is consistent with acute interstitial edematous pancreatitis.      4.  Splenomegaly      CT-ABDOMEN-PELVIS WITH   Final Result      1.  Mild inflammation about the head of the pancreas consistent with pancreatitis.      2.  Hepatic steatosis.      3.  Suggestion of borderline gallbladder wall thickening with some pericholecystic fluid without evidence of gallstones.      4.  Diverticulosis without evidence of diverticulitis.      US-RUQ   Final Result      1.  Hepatic steatosis.      2.  Limited evaluation of the pancreas is grossly unremarkable.      3.  No evidence of gallstones.      4.  Borderline aneurysmal dilatation of the proximal abdominal aorta 3.2 cm.           Assessment/Plan  * Acute biliary pancreatitis without infection or necrosis- (present on admission)  Assessment & Plan  Lipase 905 on admission  Improving  MRCP - no choledocholithiasis and positive acute cholecystitis  Switched IV Zosyn to ceftriaxone  Continue IV hydration  Advance diet as tolerated after surgery    Acute cholecystitis- (present on admission)  Assessment & Plan  Patient was admitted with acute abdominal pain, febrile 100.9F in the ED, WBC 11.2.  MRCP was showing signs of acute cholecystitis.  Patient to the OR today for cholecystectomy  Will need pain control  Nutrition consult for surgery    Bacteremia with E. coli- (present on admission)  Assessment & Plan  BC growing E. Coli.   Pansensitive.  Clinically improving.   Suspect GI source  Change Zosyn to ceftriaxone  MRCP - no choledocholithiasis and positive acute cholecystitis    Bilirubinemia- (present on admission)  Assessment & Plan  Total Bilirubin 2.4  Continue to monitor    Transaminitis- (present on admission)  Assessment & Plan  AST/ALT: 143/181  Continue to monitor    HTN (hypertension)- (present on admission)  Assessment & Plan  Continue home meds  Continue to monitor    Seizure (HCC)- (present on admission)  Assessment & Plan  History of seizure disorder  Keppra BID, follows with neurology Dr. Cook  Seizure precautions in place  Most recent seizure 2 years ago per patient    HLD (hyperlipidemia)- (present on admission)  Assessment & Plan  Lipid panel wnl.  Continue home statin     VTE prophylaxis: Lovenox

## 2021-06-10 NOTE — ANESTHESIA POSTPROCEDURE EVALUATION
Patient: Flex Perrin    Procedure Summary     Date: 06/10/21 Room / Location: [facility] 09 / SURGERY [facility]    Anesthesia Start: 1436 Anesthesia Stop: 1544    Procedures:       CHOLECYSTECTOMY, LAPAROSCOPIC (Abdomen)      REPAIR, HERNIA, UMBILICAL LAPRASCOPIC (Abdomen) Diagnosis: (Grade II (moderate) gallstone pancreatitis)    Surgeons: Amber Feliciano M.D. Responsible Provider: Tayler Rosen M.D.    Anesthesia Type: general ASA Status: 3          Final Anesthesia Type: general  Last vitals  BP   Blood Pressure: 112/61, NIBP: 128/79    Temp   37 °C (98.6 °F)    Pulse   60   Resp   15    SpO2   94 %      Anesthesia Post Evaluation    Patient location during evaluation: PACU  Patient participation: complete - patient participated  Level of consciousness: awake and alert  Pain score: 0    Airway patency: patent  Anesthetic complications: no  Cardiovascular status: hemodynamically stable  Respiratory status: acceptable  Hydration status: euvolemic    PONV: none          No complications documented.     Nurse Pain Score: 0 (NPRS)

## 2021-06-10 NOTE — WOUND TEAM
Pt rounded on by wound team for consult on R buttock.  Pt off floor for surgery.  Discussed wound consult with bedside RN, wound does not appear to be pressure related from wound pictures, pt has a round patch of flat, pink skin on fleshy part of R buttock.  Wound team will attempt to see pt later today or in AM.

## 2021-06-10 NOTE — PROGRESS NOTES
4 Eyes Skin Assessment Completed by GLADIS Hernandez and GLADIS King.    Head WDL  Ears WDL  Nose WDL  Mouth WDL  Neck WDL  Breast/Chest Scar  Shoulder Blades WDL  Spine WDL  (R) Arm/Elbow/Hand WDL  (L) Arm/Elbow/Hand Scar  Abdomen umbilical hernia  Groin WDL  Scrotum/Coccyx/Buttocks Redness and Blanching Red open peeling blanching spot to R buttocks  (R) Leg WDL  (L) Leg WDL  (R) Heel/Foot/Toe Dry  (L) Heel/Foot/Toe Dry          Devices In Places PIV      Interventions In Place Pt ambulatory    Possible Skin Injury Yes    Pictures Uploaded Into Epic Yes  Wound Consult Placed Yes  RN Wound Prevention Protocol Ordered Yes
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
## 2021-06-10 NOTE — PROGRESS NOTES
Bedside report received. Pt is A&Ox4, pt is resting in bed most of the day, pt has been NPO since midnight. Plan is for pt to have David olson at 1830 today. POC discussed with pt; all questions answered at this time.

## 2021-06-10 NOTE — CARE PLAN
The patient is Stable - Low risk of patient condition declining or worsening    Shift Goals  Clinical Goals: patient's pain will be managed  Patient Goals: rest    Progress made toward(s) clinical / shift goals: patient has not complained of pain, able to ambulate with steady gait.     Patient is not progressing towards the following goals:

## 2021-06-10 NOTE — CARE PLAN
The patient is Stable - Low risk of patient condition declining or worsening    Shift Goals  Clinical Goals: pt's pain level will decrease  Patient Goals: rest    Progress made toward(s) clinical / shift goals:  Pt assessed for pain regularly and medicated PRN per MAR.      Patient is not progressing towards the following goals:

## 2021-06-10 NOTE — ANESTHESIA TIME REPORT
Anesthesia Start and Stop Event Times     Date Time Event    6/10/2021 1426 Ready for Procedure     1436 Anesthesia Start     1544 Anesthesia Stop        Responsible Staff  06/10/21    Name Role Begin End    Tayler Rosen M.D. Anesth 1436 1544        Preop Diagnosis (Free Text):  Pre-op Diagnosis     Grade II (moderate) gallstone pancreatitis        Preop Diagnosis (Codes):    Post op Diagnosis  Acute pancreatitis      Premium Reason  A. 3PM - 7AM    Comments:

## 2021-06-10 NOTE — ANESTHESIA PROCEDURE NOTES
Airway    Date/Time: 6/10/2021 2:40 PM  Performed by: Tayler Rosen M.D.  Authorized by: Tayler Rosen M.D.     Location:  OR  Urgency:  Elective  Difficult Airway: No    Indications for Airway Management:  Anesthesia      Spontaneous Ventilation: absent    Sedation Level:  Deep  Preoxygenated: Yes    Patient Position:  Sniffing  Mask Difficulty Assessment:  1 - vent by mask  Final Airway Type:  Endotracheal airway  Final Endotracheal Airway:  ETT  Cuffed: Yes    Technique Used for Successful ETT Placement:  Direct laryngoscopy    Insertion Site:  Oral  Blade Type:  Jyoti  Laryngoscope Blade/Videolaryngoscope Blade Size:  3  ETT Size (mm):  7.5  Measured from:  Teeth  ETT to Teeth (cm):  22  Placement Verified by: auscultation and capnometry    Cormack-Lehane Classification:  Grade I - full view of glottis  Number of Attempts at Approach:  1  Number of Other Approaches Attempted:  0

## 2021-06-10 NOTE — PROGRESS NOTES
Received report and assumed care at shift change. A&O x 4, cooperative with cares. No complains of pain or distress at this time. NPO at midnight. Fall precautions in place, bed locked and in lowest position. Needs attended to.

## 2021-06-10 NOTE — ASSESSMENT & PLAN NOTE
Patient was admitted with acute abdominal pain, febrile 100.9F in the ED, WBC 11.2.  MRCP was showing signs of acute cholecystitis.  6/10 - s/p lap cholecystectomy  pain control  Nutrition consult for surgery

## 2021-06-10 NOTE — PROGRESS NOTES
Received report from Antoinette. Pt arrived to [place] [address] via wheelchair. Assumed care of pt. Pt ambulated to hospital bed. Pt is A&Ox4, resting in bed on room air. Pt reports no pain or discomfort. Pt oriented to call light and room. No other needs at this time.

## 2021-06-11 LAB
ALBUMIN SERPL BCP-MCNC: 3.3 G/DL (ref 3.2–4.9)
ALBUMIN/GLOB SERPL: 1.4 G/DL
ALP SERPL-CCNC: 53 U/L (ref 30–99)
ALT SERPL-CCNC: 73 U/L (ref 2–50)
ANION GAP SERPL CALC-SCNC: 11 MMOL/L (ref 7–16)
AST SERPL-CCNC: 54 U/L (ref 12–45)
BASOPHILS # BLD AUTO: 0.4 % (ref 0–1.8)
BASOPHILS # BLD: 0.03 K/UL (ref 0–0.12)
BILIRUB SERPL-MCNC: 0.9 MG/DL (ref 0.1–1.5)
BUN SERPL-MCNC: 14 MG/DL (ref 8–22)
CALCIUM SERPL-MCNC: 7.4 MG/DL (ref 8.5–10.5)
CHLORIDE SERPL-SCNC: 105 MMOL/L (ref 96–112)
CO2 SERPL-SCNC: 22 MMOL/L (ref 20–33)
CREAT SERPL-MCNC: 0.74 MG/DL (ref 0.5–1.4)
EOSINOPHIL # BLD AUTO: 0.01 K/UL (ref 0–0.51)
EOSINOPHIL NFR BLD: 0.1 % (ref 0–6.9)
ERYTHROCYTE [DISTWIDTH] IN BLOOD BY AUTOMATED COUNT: 38.3 FL (ref 35.9–50)
GLOBULIN SER CALC-MCNC: 2.4 G/DL (ref 1.9–3.5)
GLUCOSE SERPL-MCNC: 103 MG/DL (ref 65–99)
HCT VFR BLD AUTO: 36 % (ref 42–52)
HGB BLD-MCNC: 12 G/DL (ref 14–18)
IMM GRANULOCYTES # BLD AUTO: 0.03 K/UL (ref 0–0.11)
IMM GRANULOCYTES NFR BLD AUTO: 0.4 % (ref 0–0.9)
LIPASE SERPL-CCNC: 17 U/L (ref 11–82)
LYMPHOCYTES # BLD AUTO: 0.78 K/UL (ref 1–4.8)
LYMPHOCYTES NFR BLD: 10.5 % (ref 22–41)
MAGNESIUM SERPL-MCNC: 1.8 MG/DL (ref 1.5–2.5)
MCH RBC QN AUTO: 28.9 PG (ref 27–33)
MCHC RBC AUTO-ENTMCNC: 33.3 G/DL (ref 33.7–35.3)
MCV RBC AUTO: 86.7 FL (ref 81.4–97.8)
MONOCYTES # BLD AUTO: 0.48 K/UL (ref 0–0.85)
MONOCYTES NFR BLD AUTO: 6.5 % (ref 0–13.4)
NEUTROPHILS # BLD AUTO: 6.1 K/UL (ref 1.82–7.42)
NEUTROPHILS NFR BLD: 82.1 % (ref 44–72)
NRBC # BLD AUTO: 0 K/UL
NRBC BLD-RTO: 0 /100 WBC
PHOSPHATE SERPL-MCNC: 2.9 MG/DL (ref 2.5–4.5)
PLATELET # BLD AUTO: 153 K/UL (ref 164–446)
PMV BLD AUTO: 10.9 FL (ref 9–12.9)
POTASSIUM SERPL-SCNC: 3.8 MMOL/L (ref 3.6–5.5)
PROT SERPL-MCNC: 5.7 G/DL (ref 6–8.2)
RBC # BLD AUTO: 4.15 M/UL (ref 4.7–6.1)
SODIUM SERPL-SCNC: 138 MMOL/L (ref 135–145)
WBC # BLD AUTO: 7.4 K/UL (ref 4.8–10.8)

## 2021-06-11 PROCEDURE — 700102 HCHG RX REV CODE 250 W/ 637 OVERRIDE(OP): Performed by: SURGERY

## 2021-06-11 PROCEDURE — 36415 COLL VENOUS BLD VENIPUNCTURE: CPT

## 2021-06-11 PROCEDURE — 770006 HCHG ROOM/CARE - MED/SURG/GYN SEMI*

## 2021-06-11 PROCEDURE — 85025 COMPLETE CBC W/AUTO DIFF WBC: CPT

## 2021-06-11 PROCEDURE — 80053 COMPREHEN METABOLIC PANEL: CPT

## 2021-06-11 PROCEDURE — 700111 HCHG RX REV CODE 636 W/ 250 OVERRIDE (IP): Performed by: INTERNAL MEDICINE

## 2021-06-11 PROCEDURE — 99232 SBSQ HOSP IP/OBS MODERATE 35: CPT | Performed by: INTERNAL MEDICINE

## 2021-06-11 PROCEDURE — 97165 OT EVAL LOW COMPLEX 30 MIN: CPT

## 2021-06-11 PROCEDURE — 700102 HCHG RX REV CODE 250 W/ 637 OVERRIDE(OP): Performed by: STUDENT IN AN ORGANIZED HEALTH CARE EDUCATION/TRAINING PROGRAM

## 2021-06-11 PROCEDURE — 83690 ASSAY OF LIPASE: CPT

## 2021-06-11 PROCEDURE — 700105 HCHG RX REV CODE 258: Performed by: NURSE PRACTITIONER

## 2021-06-11 PROCEDURE — 84100 ASSAY OF PHOSPHORUS: CPT

## 2021-06-11 PROCEDURE — 83735 ASSAY OF MAGNESIUM: CPT

## 2021-06-11 PROCEDURE — A9270 NON-COVERED ITEM OR SERVICE: HCPCS | Performed by: STUDENT IN AN ORGANIZED HEALTH CARE EDUCATION/TRAINING PROGRAM

## 2021-06-11 PROCEDURE — A9270 NON-COVERED ITEM OR SERVICE: HCPCS | Performed by: SURGERY

## 2021-06-11 RX ADMIN — LEVETIRACETAM 500 MG: 500 TABLET ORAL at 05:31

## 2021-06-11 RX ADMIN — SODIUM CHLORIDE: 9 INJECTION, SOLUTION INTRAVENOUS at 06:20

## 2021-06-11 RX ADMIN — OXYCODONE 5 MG: 5 TABLET ORAL at 03:37

## 2021-06-11 RX ADMIN — SODIUM CHLORIDE: 9 INJECTION, SOLUTION INTRAVENOUS at 14:11

## 2021-06-11 RX ADMIN — LEVETIRACETAM 500 MG: 500 TABLET ORAL at 17:53

## 2021-06-11 RX ADMIN — DOCUSATE SODIUM 50 MG AND SENNOSIDES 8.6 MG 2 TABLET: 8.6; 5 TABLET, FILM COATED ORAL at 05:31

## 2021-06-11 RX ADMIN — OXYCODONE 5 MG: 5 TABLET ORAL at 08:56

## 2021-06-11 RX ADMIN — DOCUSATE SODIUM 50 MG AND SENNOSIDES 8.6 MG 2 TABLET: 8.6; 5 TABLET, FILM COATED ORAL at 17:54

## 2021-06-11 RX ADMIN — ASPIRIN 81 MG: 81 TABLET, COATED ORAL at 05:31

## 2021-06-11 RX ADMIN — SODIUM CHLORIDE: 9 INJECTION, SOLUTION INTRAVENOUS at 22:36

## 2021-06-11 RX ADMIN — CEFTRIAXONE SODIUM 2 G: 10 INJECTION, POWDER, FOR SOLUTION INTRAVENOUS at 05:31

## 2021-06-11 RX ADMIN — OXYCODONE 5 MG: 5 TABLET ORAL at 20:44

## 2021-06-11 RX ADMIN — OXYCODONE 5 MG: 5 TABLET ORAL at 14:10

## 2021-06-11 ASSESSMENT — ENCOUNTER SYMPTOMS
SHORTNESS OF BREATH: 0
CHILLS: 0
DIZZINESS: 0
FEVER: 0
BACK PAIN: 0
PALPITATIONS: 0
ABDOMINAL PAIN: 1
CONSTIPATION: 0
DIARRHEA: 0
VOMITING: 0
HEADACHES: 0
COUGH: 0
NAUSEA: 0

## 2021-06-11 ASSESSMENT — COGNITIVE AND FUNCTIONAL STATUS - GENERAL
DAILY ACTIVITIY SCORE: 24
SUGGESTED CMS G CODE MODIFIER DAILY ACTIVITY: CH

## 2021-06-11 ASSESSMENT — PAIN DESCRIPTION - PAIN TYPE
TYPE: SURGICAL PAIN

## 2021-06-11 ASSESSMENT — ACTIVITIES OF DAILY LIVING (ADL): TOILETING: INDEPENDENT

## 2021-06-11 NOTE — OR NURSING
Detailed telephone report to GLADIS Pérez for S [id number], patient stable and satisfactory with mild abdominal pain and cramping. Tolerates water with no N/E. Patient will be transferred on 2 liters NC with 3: 30 hours remaining. Belongings obtained from locker  and placed on bed.

## 2021-06-11 NOTE — DIETARY
Nutrition Note:    Education Documentation  Diet Instruction, taught by Trista Gutierrez at 6/11/2021 12:37 PM.  Learner: Patient  Readiness: Acceptance  Method: Explanation, Handout  Response: Verbal Demonstration  Comment: S/p cholecystectomy - provided appropriate education materials from Nutrition Care Manual, discussed low fat diet modifications s/p procedure. Patient verbalized understanding.    Education Comments  No comments found.    RD adjusted inpatient diet accordingly for low fat modification s/p cholecystectomy. Available PRN; will monitor within department protocols.

## 2021-06-11 NOTE — PROGRESS NOTES
Pt is A&Ox4, pt weaned down to 1L of oxygen, O2 sat in the 90s. Ambulates around the unit with steady gait. Running NS @125 ml/hr. Bleeding around hernia repair site, old dressing removed and compression dressing applied per surgeons order. Pt medicated with PRN pain medication through out. POC discussed with patient. All comfort measures in place. Call light and personal belongings by bedside. Bed locked and in lowest position. Hourly rounding in place.
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
## 2021-06-11 NOTE — CARE PLAN
The patient is Stable - Low risk of patient condition declining or worsening    Shift Goals  Clinical Goals: (P) Pain management  Patient Goals: rest    Progress made toward(s) clinical / shift goals:  Pt assessed for pain regularly and medicated with PRN Oxycodone per MAR.      Patient is not progressing towards the following goals:

## 2021-06-11 NOTE — OP REPORT
DATE OF SERVICE:  06/10/2021     PREOPERATIVE DIAGNOSES:  Gallstone pancreatitis and umbilical hernia.     POSTOPERATIVE DIAGNOSES:  Gallstone pancreatitis and umbilical hernia.     PROCEDURES:  Laparoscopic cholecystectomy and umbilical hernia repair.     SURGEON:  Amber Feliciano MD     ASSISTANT:  FIDE Villanueva     ANESTHESIA:  General endotracheal.     ANESTHESIOLOGIST:  Tayler Rosen MD     INDICATIONS:  The patient is a 60-year-old gentleman who was admitted a few   days ago with gallstone pancreatitis.  His lipase is normal and liver   functions have normalized.  An MRI demonstrated no evidence of   choledocholithiasis.  He is being brought to the operating room at this time   for laparoscopic cholecystectomy.  In addition he has umbilical hernia, which   will also be repaired.The indications for a surgical assistant in this surgery were indicated due to complexity of the procedure. Their role included aiding in incision, retraction, holding devices including camera for laparoscopic procedure, and closure of the wound. The indications for a surgical assistant in this surgery were indicated due to complexity of the procedure. Their role included aiding in incision, retraction, holding devices and closure of the wound.      FINDINGS:  Acute-on-chronic cholecystitis, single duct and single artery   identified.  There was also 1 cm umbilical hernia that was repaired primarily.     DESCRIPTION OF PROCEDURE:  The patient was identified and consented, he was   brought to the OR and placed in supine position.  The patient underwent   general endotracheal anesthetic and the patient's abdomen was prepped and   draped in sterile fashion.  A periumbilical incision was made using   electrocautery, subcutaneous tissue was dissected around where the abdominal   wall was lifted up, Veress needle was inserted into the abdominal cavity.    After positive drop test, pneumoperitoneum obtained, the Veress needle was    removed.  A 5 trocar was placed.  Under laparoscopic guidance, a 10 mm trocar   was placed in the epigastric position and two 5 mm trocars placed in right   subcostal position.  Gallbladder was lifted up, adhesions taken down.  The   duct artery and surrounding tissues doubly clipped and transected.    Unfortunately, the clips on the cystic artery did not clip well and there was   bleeding.  This was ultimately controlled with Hemoclips.  Once that was   completed, the gallbladder was isolated and divided using electrocautery, was   brought through the epigastric port.  Liver bed was irrigated.  Hemostasis   occurred.  Pneumoperitoneum was released.  The umbilical incision was then   extended and the umbilical hernia was repaired with 0 Ethibond.  Subcutaneous   tissues were reapproximated with 3-0 Vicryl.  Skin was closed with running 4-0   Vicryl in subcuticular fashion.  Op-Site dressing placed on the wound.  The   patient was extubated and taken to recovery room in stable condition.  All   sponge and needle counts were correct.        ______________________________  WILLY KNOX MD    [facility]    DD:  06/10/2021 15:42  DT:  06/10/2021 16:49    Job#:  [id number]    CC:ROSEANN MASON MD

## 2021-06-11 NOTE — PROGRESS NOTES
Pt arrived back to unit. Pt is A&Ox4, VSS, pt is on 2 LO2 via nasal cannula pt is tolerating well SpO2 98%. Pt complains of abdominal cramping,  but no pain at this time. Pt has 4 Lap sites, dressings clean, dry and intact. Fall precautions in place.

## 2021-06-11 NOTE — PROGRESS NOTES
"    DATE: 6/11/2021    Post Operative Day  1 laparoscopic cholecystectomy.    Interval Events:  SP lap whitney. Doing ok. Pain controlled. LFTs ok. .    PHYSICAL EXAMINATION:  Constitutional:     Vital Signs: /60   Pulse 66   Temp 37.6 °C (99.7 °F) (Temporal)   Resp 17   Ht 1.778 m (5' 10\")   Wt 101 kg (223 lb 12.3 oz)   SpO2 95%    General Appearance: The patient is a pleasant  man in no critical distress.   Respiratory:   Inspection: Unlabored respirations, no intercostal retractions, paradoxical motion, or accessory muscle use.      Cardiovascular:   Inspection: The skin is warm.   Abdomen:   Inspection: Abdominal inspection reveals dressings dry.   Palpation: Palpation is remarkable for mild tenderness in the right upper quadrant  region.     Laboratory Values:   Recent Labs     06/09/21  0520 06/10/21  0445 06/11/21  0645   WBC 5.5 4.2* 7.4   RBC 4.83 4.60* 4.15*   HEMOGLOBIN 13.6* 13.0* 12.0*   HEMATOCRIT 41.5* 39.8* 36.0*   MCV 85.9 86.5 86.7   MCH 28.2 28.3 28.9   MCHC 32.8* 32.7* 33.3*   RDW 37.2 37.3 38.3   PLATELETCT 120* 148* 153*   MPV 10.7 11.1 10.9     Recent Labs     06/09/21  0520 06/10/21  0445 06/11/21  0645   SODIUM 140 142 138   POTASSIUM 3.7 4.2 3.8   CHLORIDE 108 109 105   CO2 21 24 22   GLUCOSE 98 98 103*   BUN 15 11 14   CREATININE 0.68 0.72 0.74   CALCIUM 7.9* 7.9* 7.4*     Recent Labs     06/09/21  0520 06/10/21  0445 06/11/21  0645   ASTSGOT 23 22 54*   ALTSGPT 75* 55* 73*   TBILIRUBIN 0.9 0.9 0.9   ALKPHOSPHAT 59 58 53   GLOBULIN 2.4 2.1 2.4            Imaging:   [id number]   Final Result      1.  Cholelithiasis and sludge within the gallbladder with wall thickening. Findings are suggestive of acute cholecystitis in the appropriate clinical setting.      2.  No biliary obstruction or choledocholithiasis identified.      3.  Inflammation surrounding the pancreatic head is consistent with acute interstitial edematous pancreatitis.      4.  Splenomegaly      CT-ABDOMEN-PELVIS " WITH   Final Result      1.  Mild inflammation about the head of the pancreas consistent with pancreatitis.      2.  Hepatic steatosis.      3.  Suggestion of borderline gallbladder wall thickening with some pericholecystic fluid without evidence of gallstones.      4.  Diverticulosis without evidence of diverticulitis.      US-RUQ   Final Result      1.  Hepatic steatosis.      2.  Limited evaluation of the pancreas is grossly unremarkable.      3.  No evidence of gallstones.      4.  Borderline aneurysmal dilatation of the proximal abdominal aorta 3.2 cm.          ASSESSMENT AND PLAN:     * Acute biliary pancreatitis without infection or necrosis- (present on admission)  Assessment & Plan  Presented with pancreatitis  Neg US for stones  LFTs improving  Bacteremia  MRCP shows acute whitney with sludge  6/10 Lap whitney        DISPOSITION: Adv diet. Mobilize. Wean oxygen.       ____________________________________     Amber Feliciano M.D.    DD: 6/11/2021  8:48 AM

## 2021-06-11 NOTE — THERAPY
Occupational Therapy   Initial Evaluation     Patient Name: Flex Perrin  Age:  60 y.o., Sex:  male  Medical Record #: [id number]  Today's Date: 6/11/2021     Precautions  Comments: s/p abd surgery    Assessment  Patient is 60 y.o. male with a diagnosis of hernia, pancreatitis, s/p lap whitney and hernia repair. Pt edu on compensatory strategies during ADLs as well as appropriate AE/DME to maximize independence and safety.    Plan    Recommend Occupational Therapy for Evaluation only     DC Equipment Recommendations: None  Discharge Recommendations: Anticipate that the patient will have no further occupational therapy needs after discharge from the hospital     Subjective    Pleasant and cooperative     Objective       06/11/21 0851   Prior Living Situation   Prior Services Home-Independent   Housing / Facility 1 Story House   Bathroom Set up Walk In Shower   Equipment Owned None   Lives with - Patient's Self Care Capacity Alone and Able to Care For Self   Comments Pt has social support if needed   Prior Level of ADL Function   Self Feeding Independent   Grooming / Hygiene Independent   Bathing Independent   Dressing Independent   Toileting Independent   Prior Level of IADL Function   Medication Management Independent   Laundry Independent   Kitchen Mobility Independent   Finances Independent   Home Management Independent   Shopping Independent   Prior Level Of Mobility Independent Without Device in Community;Independent Without Device in Home   Driving / Transportation Driving Independent   Occupation (Pre-Hospital Vocational) Employed Full Time  (works as an upholsterer)   Precautions   Comments s/p abd surgery   Pain 0 - 10 Group   Therapist Pain Assessment Nurse Notified;During Activity  (min c/o abd pain)   Cognition    Cognition / Consciousness WDL   Level of Consciousness Alert   Active ROM Upper Body   Active ROM Upper Body  WDL   Strength Upper Body   Upper Body Strength  WDL   Balance Assessment   Sitting  Balance (Static) Good   Sitting Balance (Dynamic) Fair +   Standing Balance (Static) Good   Standing Balance (Dynamic) Fair +   Weight Shift Sitting Good   Weight Shift Standing Good   Comments no AD   Bed Mobility    Supine to Sit Supervised   Scooting Supervised   Comments introduced to log roll   ADL Assessment   Grooming Supervision;Standing   Lower Body Dressing Supervision   Toileting Supervision   Comments given demo of AE in case abd pain worsens   How much help from another person does the patient currently need...   Putting on and taking off regular lower body clothing? 4   Bathing (including washing, rinsing, and drying)? 4   Toileting, which includes using a toilet, bedpan, or urinal? 4   Putting on and taking off regular upper body clothing? 4   Taking care of personal grooming such as brushing teeth? 4   Eating meals? 4   6 Clicks Daily Activity Score 24   Functional Mobility   Sit to Stand Supervised   Bed, Chair, Wheelchair Transfer Supervised   Mobility EOB>Sink>BR>Chair   Comments noAD

## 2021-06-11 NOTE — PROGRESS NOTES
Assessment/description of ears?  Pink and blanching  Which preventative measures are in place for the ears?n/a    Assessment/description of elbows? Pink and blanching  Which preventative measures are in place for the elbows? Pt is able to turn self and offload     Assessment/description of sacrum? Pink, blanching, circular, open, redness to right buttock  Which preventative measures are in place for the sacrum? Patient is ambulatory and can turn self in bed.      Assessment/description of heels? Pink and blanching  Which preventative measures are in place for the heels? Patient is ambulatory, and can turn self.     Which devices are in place? PIV  Description of skin under devices: intact  Which preventative measures are in place under devices? n/a     Other: Four surgical sites to abdomen, dressing CDI. Dressing to hernia repair site.

## 2021-06-11 NOTE — CARE PLAN
The patient is Stable - Low risk of patient condition declining or worsening    Shift Goals  Clinical Goals: patient's pain will be managed  Patient Goals: rest    Progress made toward(s) clinical / shift goals:  medicated with prn Oxycodone for surgical pain with good result.     Patient is not progressing towards the following goals:

## 2021-06-11 NOTE — PROGRESS NOTES
Hospital Medicine Daily Progress Note    Date of Service  6/11/2021    Chief Complaint  60 y.o. male admitted 6/7/2021 with abdominal pain    Hospital Course  60-year-old male with a past medical history of hyperlipidemia, COPD, CAD, thoracic aortic aneurysm, seizures presenting 6/7/2021 for worsening epigastric pain and nausea.  Patient noted this pain after he ate breakfast and the admission.  In the ED patient was found to have elevated LFTs, total bilirubin 2.4, lipase 905, temperature 100.9 °F, appeared septic but did not meet full criteria, CT scan showed gallbladder wall thickening, right upper quadrant ultrasound did not show acute cholecystitis.  MRCP showed cholelithiasis and sludge with gallbladder wall thickening, suggestive of acute cholecystitis as well as inflammation of the pancreatic head.  General surgery was consulted and pending cholecystectomy.    Interval Problem Update  6/10/2021-patient seen and evaluated at bedside.  Patient stated his abdominal pain was under control at this time.  Patient was scheduled for laparoscopic cholecystectomy today with general surgery.  Patient continued on IV antibiotics due to E. coli which came back sensitive for ceftriaxone.  Switched IV Zosyn to ceftriaxone.    6/11 - patient s/p cholecystectomy yesterday, patient has abdominal pain after surgery did not feel well to go home. Patient continued on IV ceftriaxone for bacteremia, as remained afebrile. Passing gas.    Consultants/Specialty  General surgery    Code Status  Full Code    Disposition  Likely discharge in the next 24 to 48 hours, if patient remains afebrile.    Review of Systems  Review of Systems   Constitutional: Negative for chills, fever and malaise/fatigue.   Respiratory: Negative for cough and shortness of breath.    Cardiovascular: Negative for chest pain and palpitations.   Gastrointestinal: Positive for abdominal pain (post-surgery). Negative for constipation, diarrhea, nausea and vomiting.    Musculoskeletal: Negative for back pain and joint pain.   Neurological: Negative for dizziness and headaches.   All other systems reviewed and are negative.     Physical Exam  Temp:  [36.8 °C (98.2 °F)-37.6 °C (99.7 °F)] 37.6 °C (99.7 °F)  Pulse:  [60-78] 66  Resp:  [15-20] 17  BP: (101-129)/(59-73) 103/60  SpO2:  [91 %-100 %] 92 %    Physical Exam  Vitals and nursing note reviewed.   Constitutional:       General: He is not in acute distress.     Appearance: He is obese. He is not diaphoretic.   Cardiovascular:      Rate and Rhythm: Normal rate and regular rhythm.      Pulses: Normal pulses.      Heart sounds: Normal heart sounds. No murmur heard.     Pulmonary:      Effort: Pulmonary effort is normal. No respiratory distress.      Breath sounds: Normal breath sounds. No wheezing or rales.   Abdominal:      General: Abdomen is flat. Bowel sounds are normal. There is no distension.      Palpations: Abdomen is soft.      Tenderness: There is no abdominal tenderness.   Musculoskeletal:         General: No swelling or tenderness. Normal range of motion.   Skin:     General: Skin is warm.      Capillary Refill: Capillary refill takes less than 2 seconds.      Coloration: Skin is not jaundiced or pale.   Neurological:      General: No focal deficit present.      Mental Status: He is alert and oriented to person, place, and time. Mental status is at baseline.   Psychiatric:         Mood and Affect: Mood normal.         Behavior: Behavior normal.         Thought Content: Thought content normal.         Judgment: Judgment normal.         Fluids    Intake/Output Summary (Last 24 hours) at 6/11/2021 1530  Last data filed at 6/11/2021 1411  Gross per 24 hour   Intake 2720 ml   Output 850 ml   Net 1870 ml       Laboratory  Recent Labs     06/09/21  0520 06/10/21  0445 06/11/21  0645   WBC 5.5 4.2* 7.4   RBC 4.83 4.60* 4.15*   HEMOGLOBIN 13.6* 13.0* 12.0*   HEMATOCRIT 41.5* 39.8* 36.0*   MCV 85.9 86.5 86.7   MCH 28.2 28.3 28.9    MCHC 32.8* 32.7* 33.3*   RDW 37.2 37.3 38.3   PLATELETCT 120* 148* 153*   MPV 10.7 11.1 10.9     Recent Labs     06/09/21  0520 06/10/21  0445 06/11/21  0645   SODIUM 140 142 138   POTASSIUM 3.7 4.2 3.8   CHLORIDE 108 109 105   CO2 21 24 22   GLUCOSE 98 98 103*   BUN 15 11 14   CREATININE 0.68 0.72 0.74   CALCIUM 7.9* 7.9* 7.4*             Recent Labs     06/09/21  0520   TRIGLYCERIDE 80   HDL 22*   LDL 53       Imaging  [id number]/O   Final Result      1.  Cholelithiasis and sludge within the gallbladder with wall thickening. Findings are suggestive of acute cholecystitis in the appropriate clinical setting.      2.  No biliary obstruction or choledocholithiasis identified.      3.  Inflammation surrounding the pancreatic head is consistent with acute interstitial edematous pancreatitis.      4.  Splenomegaly      CT-ABDOMEN-PELVIS WITH   Final Result      1.  Mild inflammation about the head of the pancreas consistent with pancreatitis.      2.  Hepatic steatosis.      3.  Suggestion of borderline gallbladder wall thickening with some pericholecystic fluid without evidence of gallstones.      4.  Diverticulosis without evidence of diverticulitis.      US-RUQ   Final Result      1.  Hepatic steatosis.      2.  Limited evaluation of the pancreas is grossly unremarkable.      3.  No evidence of gallstones.      4.  Borderline aneurysmal dilatation of the proximal abdominal aorta 3.2 cm.           Assessment/Plan  * Acute biliary pancreatitis without infection or necrosis- (present on admission)  Assessment & Plan  Lipase 905 on admission  Improving  MRCP - no choledocholithiasis and positive acute cholecystitis  Continue IV ceftriaxone  Continue IV hydration  Advance diet as tolerated    Acute cholecystitis- (present on admission)  Assessment & Plan  Patient was admitted with acute abdominal pain, febrile 100.9F in the ED, WBC 11.2.  MRCP was showing signs of acute cholecystitis.  6/10 - s/p lap  cholecystectomy  pain control  Nutrition consult for surgery    Bacteremia with E. coli- (present on admission)  Assessment & Plan  MRCP - no choledocholithiasis and positive acute cholecystitis  BC growing E. Coli.  Pansensitive.  Clinically improving, remains afebrile.  Suspect GI source  Continue ceftriaxone    Bilirubinemia- (present on admission)  Assessment & Plan  Total Bilirubin 2.4  Continue to monitor    Transaminitis- (present on admission)  Assessment & Plan  AST/ALT: 143/181  Continue to monitor    HTN (hypertension)- (present on admission)  Assessment & Plan  Continue home meds  Continue to monitor    Seizure (HCC)- (present on admission)  Assessment & Plan  History of seizure disorder  Keppra BID, follows with neurology Dr. Cook  Seizure precautions in place  Most recent seizure 2 years ago per patient    HLD (hyperlipidemia)- (present on admission)  Assessment & Plan  Lipid panel wnl.  Continue home statin     VTE prophylaxis: Lovenox

## 2021-06-12 ENCOUNTER — APPOINTMENT (OUTPATIENT)
Dept: CARDIOLOGY | Facility: MEDICAL CENTER | Age: 61
DRG: 417 | End: 2021-06-12
Attending: INTERNAL MEDICINE

## 2021-06-12 ENCOUNTER — APPOINTMENT (OUTPATIENT)
Dept: RADIOLOGY | Facility: MEDICAL CENTER | Age: 61
DRG: 417 | End: 2021-06-12
Attending: INTERNAL MEDICINE

## 2021-06-12 PROBLEM — R74.01 TRANSAMINITIS: Status: RESOLVED | Noted: 2021-06-08 | Resolved: 2021-06-12

## 2021-06-12 PROBLEM — R78.81 BACTEREMIA: Status: RESOLVED | Noted: 2021-06-08 | Resolved: 2021-06-12

## 2021-06-12 PROBLEM — K81.0 ACUTE CHOLECYSTITIS: Status: RESOLVED | Noted: 2021-06-10 | Resolved: 2021-06-12

## 2021-06-12 PROBLEM — J96.01 ACUTE RESPIRATORY FAILURE WITH HYPOXEMIA (HCC): Status: ACTIVE | Noted: 2021-06-12

## 2021-06-12 PROBLEM — E80.6 BILIRUBINEMIA: Status: RESOLVED | Noted: 2021-06-08 | Resolved: 2021-06-12

## 2021-06-12 PROBLEM — K85.10 ACUTE BILIARY PANCREATITIS WITHOUT INFECTION OR NECROSIS: Status: RESOLVED | Noted: 2021-06-08 | Resolved: 2021-06-12

## 2021-06-12 LAB
ALBUMIN SERPL BCP-MCNC: 3.3 G/DL (ref 3.2–4.9)
ALBUMIN/GLOB SERPL: 1.4 G/DL
ALP SERPL-CCNC: 53 U/L (ref 30–99)
ALT SERPL-CCNC: 62 U/L (ref 2–50)
ANION GAP SERPL CALC-SCNC: 8 MMOL/L (ref 7–16)
AST SERPL-CCNC: 40 U/L (ref 12–45)
BILIRUB SERPL-MCNC: 0.6 MG/DL (ref 0.1–1.5)
BUN SERPL-MCNC: 11 MG/DL (ref 8–22)
CALCIUM SERPL-MCNC: 7.5 MG/DL (ref 8.5–10.5)
CHLORIDE SERPL-SCNC: 107 MMOL/L (ref 96–112)
CO2 SERPL-SCNC: 21 MMOL/L (ref 20–33)
CREAT SERPL-MCNC: 0.6 MG/DL (ref 0.5–1.4)
ERYTHROCYTE [DISTWIDTH] IN BLOOD BY AUTOMATED COUNT: 37.6 FL (ref 35.9–50)
GLOBULIN SER CALC-MCNC: 2.3 G/DL (ref 1.9–3.5)
GLUCOSE SERPL-MCNC: 99 MG/DL (ref 65–99)
HCT VFR BLD AUTO: 36 % (ref 42–52)
HGB BLD-MCNC: 12 G/DL (ref 14–18)
LV EJECT FRACT  99904: 65
LV EJECT FRACT MOD 2C 99903: 53.16
LV EJECT FRACT MOD 4C 99902: 58.45
LV EJECT FRACT MOD BP 99901: 55.67
MAGNESIUM SERPL-MCNC: 1.9 MG/DL (ref 1.5–2.5)
MCH RBC QN AUTO: 28.5 PG (ref 27–33)
MCHC RBC AUTO-ENTMCNC: 33.3 G/DL (ref 33.7–35.3)
MCV RBC AUTO: 85.5 FL (ref 81.4–97.8)
PHOSPHATE SERPL-MCNC: 2.5 MG/DL (ref 2.5–4.5)
PLATELET # BLD AUTO: 145 K/UL (ref 164–446)
PMV BLD AUTO: 10.3 FL (ref 9–12.9)
POTASSIUM SERPL-SCNC: 4.1 MMOL/L (ref 3.6–5.5)
PROT SERPL-MCNC: 5.6 G/DL (ref 6–8.2)
RBC # BLD AUTO: 4.21 M/UL (ref 4.7–6.1)
SODIUM SERPL-SCNC: 136 MMOL/L (ref 135–145)
WBC # BLD AUTO: 6.6 K/UL (ref 4.8–10.8)

## 2021-06-12 PROCEDURE — 700111 HCHG RX REV CODE 636 W/ 250 OVERRIDE (IP): Performed by: INTERNAL MEDICINE

## 2021-06-12 PROCEDURE — 99232 SBSQ HOSP IP/OBS MODERATE 35: CPT | Performed by: INTERNAL MEDICINE

## 2021-06-12 PROCEDURE — 700102 HCHG RX REV CODE 250 W/ 637 OVERRIDE(OP): Performed by: STUDENT IN AN ORGANIZED HEALTH CARE EDUCATION/TRAINING PROGRAM

## 2021-06-12 PROCEDURE — A9270 NON-COVERED ITEM OR SERVICE: HCPCS | Performed by: SURGERY

## 2021-06-12 PROCEDURE — 71045 X-RAY EXAM CHEST 1 VIEW: CPT

## 2021-06-12 PROCEDURE — 83735 ASSAY OF MAGNESIUM: CPT

## 2021-06-12 PROCEDURE — 84100 ASSAY OF PHOSPHORUS: CPT

## 2021-06-12 PROCEDURE — 700105 HCHG RX REV CODE 258: Performed by: NURSE PRACTITIONER

## 2021-06-12 PROCEDURE — A9270 NON-COVERED ITEM OR SERVICE: HCPCS | Performed by: STUDENT IN AN ORGANIZED HEALTH CARE EDUCATION/TRAINING PROGRAM

## 2021-06-12 PROCEDURE — 85027 COMPLETE CBC AUTOMATED: CPT

## 2021-06-12 PROCEDURE — 93306 TTE W/DOPPLER COMPLETE: CPT | Mod: 26 | Performed by: INTERNAL MEDICINE

## 2021-06-12 PROCEDURE — 770006 HCHG ROOM/CARE - MED/SURG/GYN SEMI*

## 2021-06-12 PROCEDURE — 93306 TTE W/DOPPLER COMPLETE: CPT

## 2021-06-12 PROCEDURE — 80053 COMPREHEN METABOLIC PANEL: CPT

## 2021-06-12 PROCEDURE — 700102 HCHG RX REV CODE 250 W/ 637 OVERRIDE(OP): Performed by: SURGERY

## 2021-06-12 RX ORDER — FUROSEMIDE 10 MG/ML
40 INJECTION INTRAMUSCULAR; INTRAVENOUS ONCE
Status: COMPLETED | OUTPATIENT
Start: 2021-06-12 | End: 2021-06-12

## 2021-06-12 RX ADMIN — FUROSEMIDE 40 MG: 10 INJECTION, SOLUTION INTRAMUSCULAR; INTRAVENOUS at 15:32

## 2021-06-12 RX ADMIN — DOCUSATE SODIUM 50 MG AND SENNOSIDES 8.6 MG 2 TABLET: 8.6; 5 TABLET, FILM COATED ORAL at 04:29

## 2021-06-12 RX ADMIN — OXYCODONE 5 MG: 5 TABLET ORAL at 07:49

## 2021-06-12 RX ADMIN — ASPIRIN 81 MG: 81 TABLET, COATED ORAL at 04:29

## 2021-06-12 RX ADMIN — CEFTRIAXONE SODIUM 2 G: 10 INJECTION, POWDER, FOR SOLUTION INTRAVENOUS at 04:34

## 2021-06-12 RX ADMIN — LEVETIRACETAM 500 MG: 500 TABLET ORAL at 04:29

## 2021-06-12 RX ADMIN — LEVETIRACETAM 500 MG: 500 TABLET ORAL at 17:13

## 2021-06-12 RX ADMIN — OXYCODONE 5 MG: 5 TABLET ORAL at 03:26

## 2021-06-12 RX ADMIN — SODIUM CHLORIDE: 9 INJECTION, SOLUTION INTRAVENOUS at 07:49

## 2021-06-12 ASSESSMENT — ENCOUNTER SYMPTOMS
HEADACHES: 0
CHILLS: 0
CONSTIPATION: 0
BACK PAIN: 0
ABDOMINAL PAIN: 1
DIZZINESS: 0
PALPITATIONS: 0
VOMITING: 0
NAUSEA: 0
SHORTNESS OF BREATH: 1
COUGH: 0
FEVER: 0
DIARRHEA: 0

## 2021-06-12 ASSESSMENT — PAIN DESCRIPTION - PAIN TYPE
TYPE: SURGICAL PAIN

## 2021-06-12 NOTE — DISCHARGE PLANNING
"Medical Social Work    Anticipated Discharge Disposition: Home w/ O2    Action: LSW notified that pt medically clear to dc home today and will need home O2.      Per chart review, pt resides in [place], [place] and doesn't have insurance.  Per DPA, [facility] accepts private pay for O2 and they service [place], [place].     · LSW met w/ pt at bedside to discuss DME Choice and pt confirms he lives in [place], [place] and that he doesn't have any insurance.    · LSW explained DME provider that services the [place] area and the out of pocket cost due to pt not having insurance.  Pt became upset and wanting to know why he is requiring O2 now when he didn't need it before he came in the hospital.    · Pt told this LSW, \"I don't need O2 and I will leave the hospital without it\".   · LSW updated MD.     Barriers to Discharge: Pt has no insurance and will have to pay out-of-pocket for O2. Pt refusing to pay for O2 and pt doesn't feel he needs it at home.     Plan: Echo and CXR results are pending.  MD awaiting results to understand why pt is requiring O2.     "

## 2021-06-12 NOTE — ASSESSMENT & PLAN NOTE
Patient will benefit with weight loss, does not have primary care physician.  Educated on nutrition at this time especially given his post cholecystectomy.

## 2021-06-12 NOTE — CARE PLAN
The patient is Stable - Low risk of patient condition declining or worsening    Shift Goals  Clinical Goals: Wean patient off oxygen.   Patient Goals: Pain management    Progress made toward(s) clinical / shift goals:  Patient medicated with prn Oxycodone per MAR.     Patient is not progressing towards the following goals: Attempted to wean pt off oxygen, completed home 02 evaluation with  O2 saturations in the 80s with ambulation. CXR ordered with probable trace effusion,  lasix ordered and administered. Will continue to monitor oxygen needs.

## 2021-06-12 NOTE — ASSESSMENT & PLAN NOTE
Patient requiring 2 L nasal cannula which he does not use at baseline.  Patient does have a history of cardiac conditions.  Chest x-ray showing possible fluid overload, possibly from fluid given for acute pancreatitis  -Trial of IV Lasix  -Pending echocardiogram, no prior echocardiogram on EMR  -Attempting to get home oxygen, may not need if patient does benefit with Lasix

## 2021-06-12 NOTE — PROGRESS NOTES
Assumed care of pt at shift change. Pt is on RA with no signs of acute distress. A&Ox4. Medicated with PRN pain medication per MAR for pain to left shoulder. Fluids stopped. All comfort measures in place. Call light and personal belongings by bedside. Bed locked and in lowest position. Hourly rounding in place.

## 2021-06-12 NOTE — PROGRESS NOTES
"    DATE: 6/12/2021    Post Operative Day  2 laparoscopic cholecystectomy.    Interval Events:  Doing better. Pain controlled. LFTs ok. On reg diet.    PHYSICAL EXAMINATION:  Constitutional:     Vital Signs: /70   Pulse 70   Temp 36.9 °C (98.4 °F) (Temporal)   Resp 17   Ht 1.778 m (5' 10\")   Wt 101 kg (223 lb 12.3 oz)   SpO2 96%    General Appearance: The patient is a pleasant  man in no critical distress.   Respiratory:   Inspection: Unlabored respirations, no intercostal retractions, paradoxical motion, or accessory muscle use.      Cardiovascular:   Inspection: The skin is warm.   Abdomen:   Inspection: Abdominal inspection reveals dressings dry.   Palpation: Palpation is remarkable for mild tenderness in the right upper quadrant  region.     Laboratory Values:   Recent Labs     06/10/21  0445 06/11/21  0645 06/12/21  0559   WBC 4.2* 7.4 6.6   RBC 4.60* 4.15* 4.21*   HEMOGLOBIN 13.0* 12.0* 12.0*   HEMATOCRIT 39.8* 36.0* 36.0*   MCV 86.5 86.7 85.5   MCH 28.3 28.9 28.5   MCHC 32.7* 33.3* 33.3*   RDW 37.3 38.3 37.6   PLATELETCT 148* 153* 145*   MPV 11.1 10.9 10.3     Recent Labs     06/10/21  0445 06/11/21  0645 06/12/21  0559   SODIUM 142 138 136   POTASSIUM 4.2 3.8 4.1   CHLORIDE 109 105 107   CO2 24 22 21   GLUCOSE 98 103* 99   BUN 11 14 11   CREATININE 0.72 0.74 0.60   CALCIUM 7.9* 7.4* 7.5*     Recent Labs     06/10/21  0445 06/11/21  0645 06/12/21  0559   ASTSGOT 22 54* 40   ALTSGPT 55* 73* 62*   TBILIRUBIN 0.9 0.9 0.6   ALKPHOSPHAT 58 53 53   GLOBULIN 2.1 2.4 2.3            Imaging:   [id number]   Final Result      1.  Cholelithiasis and sludge within the gallbladder with wall thickening. Findings are suggestive of acute cholecystitis in the appropriate clinical setting.      2.  No biliary obstruction or choledocholithiasis identified.      3.  Inflammation surrounding the pancreatic head is consistent with acute interstitial edematous pancreatitis.      4.  Splenomegaly      "   CT-ABDOMEN-PELVIS WITH   Final Result      1.  Mild inflammation about the head of the pancreas consistent with pancreatitis.      2.  Hepatic steatosis.      3.  Suggestion of borderline gallbladder wall thickening with some pericholecystic fluid without evidence of gallstones.      4.  Diverticulosis without evidence of diverticulitis.      US-RUQ   Final Result      1.  Hepatic steatosis.      2.  Limited evaluation of the pancreas is grossly unremarkable.      3.  No evidence of gallstones.      4.  Borderline aneurysmal dilatation of the proximal abdominal aorta 3.2 cm.          ASSESSMENT AND PLAN:     No new Assessment & Plan notes have been filed under this hospital service since the last note was generated.  Service: Surgery General      DISPOSITION: Discharge to home.       ____________________________________     Amber Feliciano M.D.    DD: 6/11/2021  8:48 AM

## 2021-06-12 NOTE — PROGRESS NOTES
Assessment/description of ears? Pink and blanching.  Which preventative measures are in place for the ears?    Assessment/description of elbows? Pink and blanching.  Which preventative measures are in place for the elbows? Pt is able to turn self side to side.     Assessment/description of sacrum? Pink, blanching, circular, open and redness to R buttock.   Which preventative measures are in place for the sacrum? Patient is ambulatory and can turn self side to side.     Assessment/description of heels? Pink and blanching.   Which preventative measures are in place for the heels? Patient is ambulatory and can turn self in bed.     Which devices are in place? PIV  Description of skin under devices: intact.   Which preventative measures are in place under devices? N/A.     Other: four surgical sites to abdomen, dressings cdi. Dressing to hernia repair site.

## 2021-06-12 NOTE — FACE TO FACE
"Face to Face Note  -  Durable Medical Equipment    Andrew Zimmerman M.D. - NPI: [id number]  I certify that this patient is under my care and that they had a durable medical equipment(DME)face to face encounter by myself that meets the physician DME face-to-face encounter requirements with this patient on:    Date of encounter:   Patient:                    MRN:                       YOB: 2022  Flex Perrin  [id number]  1960     The encounter with the patient was in whole, or in part, for the following medical condition, which is the primary reason for durable medical equipment:  Post-Op Surgery    I certify that, based on my findings, the following durable medical equipment is medically necessary:  Oxygen.    HOME O2 Saturation Measurements:(Values must be present for Home Oxygen orders)  Room air sat at rest: 90  Room air sat with amb: 83  With liters of O2: 2, O2 sat at rest with O2: 94  With Liters of O2: 3, O2 sat with amb with O2 : 90  Is the patient mobile?: Yes    My Clinical findings support the need for the above equipment due to:  Hypoxia    Supporting Symptoms: The patient requires supplemental oxygen, as the following interventions have been tried with limited or no improvement: \"Ambulation with oximetry and \"Incentive spirometry    If patient feels more short of breath, they can go up to 6 liters per minute and contact healthcare provider.  "

## 2021-06-12 NOTE — CARE PLAN
The patient is Stable - Low risk of patient condition declining or worsening    Shift Goals  Clinical Goals: Pain management  Patient Goals: rest    Progress made toward(s) clinical / shift goals: Pt assessed for pain and medicated with PRN oxycodone 5mg.     Patient is not progressing towards the following goals:

## 2021-06-12 NOTE — PROGRESS NOTES
Assessment/description of ears?  Pink and blanching  Which preventative measures are in place for the ears?n/a     Assessment/description of elbows? Pink and blanching  Which preventative measures are in place for the elbows? Pt is able to turn self and offload     Assessment/description of sacrum? Pink, blanching, circular, open, redness to right buttock  Which preventative measures are in place for the sacrum? Patient is ambulatory and can turn self in bed.      Assessment/description of heels? Pink and blanching  Which preventative measures are in place for the heels? Patient is ambulatory, and can turn self.     Which devices are in place? PIV  Description of skin under devices: intact  Which preventative measures are in place under devices? n/a     Other: Four surgical sites to abdomen, dressing CDI. Dressing to hernia repair CDI, no bleeding noted.

## 2021-06-12 NOTE — PROGRESS NOTES
[facility] Medicine Daily Progress Note    Date of Service  6/12/2021    Chief Complaint  60 y.o. male admitted 6/7/2021 with abdominal pain    Hospital Course  60-year-old male with a past medical history of hyperlipidemia, COPD, CAD, thoracic aortic aneurysm, seizures presenting 6/7/2021 for worsening epigastric pain and nausea.  Patient noted this pain after he ate breakfast and the admission.  In the ED patient was found to have elevated LFTs, total bilirubin 2.4, lipase 905, temperature 100.9 °F, appeared septic but did not meet full criteria, CT scan showed gallbladder wall thickening, right upper quadrant ultrasound did not show acute cholecystitis.  MRCP showed cholelithiasis and sludge with gallbladder wall thickening, suggestive of acute cholecystitis as well as inflammation of the pancreatic head.  General surgery was consulted and pending cholecystectomy.    Interval Problem Update  6/10/2021-patient seen and evaluated at bedside.  Patient stated his abdominal pain was under control at this time.  Patient was scheduled for laparoscopic cholecystectomy today with general surgery.  Patient continued on IV antibiotics due to E. coli which came back sensitive for ceftriaxone.  Switched IV Zosyn to ceftriaxone.    6/11 - patient s/p cholecystectomy yesterday, patient has abdominal pain after surgery did not feel well to go home. Patient continued on IV ceftriaxone for bacteremia, as remained afebrile. Passing gas.    6/12-patient has seen evaluate today, was requiring oxygen supplementation which she did not arrive to the hospital with.  Evaluating patient at this time, chest x-ray showed a possible fluid overload with cephalization and fluid along the fissure, complete reading pending from radiologist.  Trial of IV Lasix.  Patient is uninsured and he states he cannot afford oxygen and would rather leave the hospital without it.  Will treat patients and see if he can be weaned off of oxygen, pending  echocardiogram to evaluate cardiac status as he has a history of valve repair.    Consultants/Specialty  General surgery    Code Status  Full Code    Disposition  Likely discharge in the next 24 hours    Review of Systems  Review of Systems   Constitutional: Negative for chills, fever and malaise/fatigue.   Respiratory: Positive for shortness of breath. Negative for cough.    Cardiovascular: Negative for chest pain and palpitations.   Gastrointestinal: Positive for abdominal pain (post-surgery). Negative for constipation, diarrhea, nausea and vomiting.   Musculoskeletal: Negative for back pain and joint pain.   Neurological: Negative for dizziness and headaches.   All other systems reviewed and are negative.     Physical Exam  Temp:  [36.9 °C (98.4 °F)-37.6 °C (99.6 °F)] 36.9 °C (98.4 °F)  Pulse:  [70-73] 70  Resp:  [17-18] 18  BP: (103-122)/(64-73) 119/70  SpO2:  [94 %-97 %] 96 %    Physical Exam  Vitals and nursing note reviewed.   Constitutional:       General: He is not in acute distress.     Appearance: He is obese. He is not diaphoretic.   Cardiovascular:      Rate and Rhythm: Normal rate and regular rhythm.      Pulses: Normal pulses.      Heart sounds: Normal heart sounds. No murmur heard.     Pulmonary:      Effort: Respiratory distress present.      Breath sounds: No wheezing or rales.   Abdominal:      General: Abdomen is flat. Bowel sounds are normal. There is no distension.      Palpations: Abdomen is soft.      Tenderness: There is no abdominal tenderness.   Musculoskeletal:         General: No swelling or tenderness. Normal range of motion.   Skin:     General: Skin is warm.      Capillary Refill: Capillary refill takes less than 2 seconds.      Coloration: Skin is not jaundiced or pale.   Neurological:      General: No focal deficit present.      Mental Status: He is alert and oriented to person, place, and time. Mental status is at baseline.   Psychiatric:         Mood and Affect: Mood normal.          Behavior: Behavior normal.         Thought Content: Thought content normal.         Judgment: Judgment normal.         Fluids    Intake/Output Summary (Last 24 hours) at 6/12/2021 1513  Last data filed at 6/12/2021 0727  Gross per 24 hour   Intake 250 ml   Output 1200 ml   Net -950 ml       Laboratory  Recent Labs     06/10/21  0445 06/11/21  0645 06/12/21  0559   WBC 4.2* 7.4 6.6   RBC 4.60* 4.15* 4.21*   HEMOGLOBIN 13.0* 12.0* 12.0*   HEMATOCRIT 39.8* 36.0* 36.0*   MCV 86.5 86.7 85.5   MCH 28.3 28.9 28.5   MCHC 32.7* 33.3* 33.3*   RDW 37.3 38.3 37.6   PLATELETCT 148* 153* 145*   MPV 11.1 10.9 10.3     Recent Labs     06/10/21  0445 06/11/21  0645 06/12/21  0559   SODIUM 142 138 136   POTASSIUM 4.2 3.8 4.1   CHLORIDE 109 105 107   CO2 24 22 21   GLUCOSE 98 103* 99   BUN 11 14 11   CREATININE 0.72 0.74 0.60   CALCIUM 7.9* 7.4* 7.5*                   Imaging  DX-CHEST-PORTABLE (1 VIEW)   Final Result      1.  Enlarged cardiac silhouette with bibasilar atelectasis and probable trace effusions.      [id number]-Y/O   Final Result      1.  Cholelithiasis and sludge within the gallbladder with wall thickening. Findings are suggestive of acute cholecystitis in the appropriate clinical setting.      2.  No biliary obstruction or choledocholithiasis identified.      3.  Inflammation surrounding the pancreatic head is consistent with acute interstitial edematous pancreatitis.      4.  Splenomegaly      CT-ABDOMEN-PELVIS WITH   Final Result      1.  Mild inflammation about the head of the pancreas consistent with pancreatitis.      2.  Hepatic steatosis.      3.  Suggestion of borderline gallbladder wall thickening with some pericholecystic fluid without evidence of gallstones.      4.  Diverticulosis without evidence of diverticulitis.      US-RUQ   Final Result      1.  Hepatic steatosis.      2.  Limited evaluation of the pancreas is grossly unremarkable.      3.  No evidence of gallstones.      4.  Borderline aneurysmal  dilatation of the proximal abdominal aorta 3.2 cm.      EC-ECHOCARDIOGRAM COMPLETE W/O CONT    (Results Pending)        Assessment/Plan  * Acute biliary pancreatitis without infection or necrosis- (present on admission)  Assessment & Plan  Lipase 905 on admission  Improving  MRCP - no choledocholithiasis and positive acute cholecystitis  Continue IV ceftriaxone  Continue IV hydration  Advance diet as tolerated    Acute respiratory failure with hypoxemia (HCC)  Assessment & Plan  Patient requiring 2 L nasal cannula which he does not use at baseline.  Patient does have a history of cardiac conditions.  Chest x-ray showing possible fluid overload, possibly from fluid given for acute pancreatitis  -Trial of IV Lasix  -Pending echocardiogram, no prior echocardiogram on EMR  -Attempting to get home oxygen, may not need if patient does benefit with Lasix    Bacteremia with E. coli- (present on admission)  Assessment & Plan  MRCP - no choledocholithiasis and positive acute cholecystitis  BC growing E. Coli.  Pansensitive.  Clinically improving, remains afebrile.  Suspect GI source  Continue ceftriaxone    Acute cholecystitis- (present on admission)  Assessment & Plan  Patient was admitted with acute abdominal pain, febrile 100.9F in the ED, WBC 11.2.  MRCP was showing signs of acute cholecystitis.  6/10 - s/p lap cholecystectomy  pain control  Nutrition consult for surgery    Bilirubinemia- (present on admission)  Assessment & Plan  Total Bilirubin 2.4  Continue to monitor    Transaminitis- (present on admission)  Assessment & Plan  AST/ALT: 143/181  Continue to monitor    Cardiac pacemaker in situ- (present on admission)  Overview  December 2018: Medtronic Brea BARNETT [id number] implanted by Dr. Spencer.    Assessment & Plan  Seen on chest x-ray, at this time no cardiac symptoms reported by patient.  Monitor    HTN (hypertension)- (present on admission)  Assessment & Plan  Continue home meds  Continue to monitor    Seizure  (HCC)- (present on admission)  Assessment & Plan  History of seizure disorder  Keppra BID, follows with neurology Dr. Cook  Seizure precautions in place  Most recent seizure 2 years ago per patient    Class 1 obesity in adult- (present on admission)  Assessment & Plan  Patient will benefit with weight loss, does not have primary care physician.  Educated on nutrition at this time especially given his post cholecystectomy.    HLD (hyperlipidemia)- (present on admission)  Assessment & Plan  Lipid panel wnl.  Continue home statin     VTE prophylaxis: Lovenox

## 2021-06-12 NOTE — WOUND TEAM
Pt seen by wound team for consult on R buttock.  Pt was ambulating rand when wound team visited.  Patch on R buttock had resolved, small round patch of blanching scar tissue noted but no wounds or lesions found.  Per pt he has a history of red, itchy rashes in his groin/buttocks.  Rash was relieved in the past by using the sauna.  Based on pt history and initial wound picture round, red patch may have been a fungal infection.  Wound team not following, please re-consult with any new concerns.

## 2021-06-12 NOTE — PROGRESS NOTES
Report received by dayshift RN. Assumed care of pt. Assessment complete. Pt A&Ox4, VSS and on 1L NC. Pt was previously medicated for 8/10 abdominal pain per MAR. Dressing cdi. Pt currently resting in bed, all needs met at this time. Call light within reach, bed in lowest position, and pt has no further questions at this time.

## 2021-06-13 VITALS
RESPIRATION RATE: 17 BRPM | OXYGEN SATURATION: 92 % | TEMPERATURE: 97.9 F | HEIGHT: 70 IN | SYSTOLIC BLOOD PRESSURE: 121 MMHG | DIASTOLIC BLOOD PRESSURE: 74 MMHG | BODY MASS INDEX: 32.04 KG/M2 | HEART RATE: 54 BPM | WEIGHT: 223.77 LBS

## 2021-06-13 PROBLEM — I50.32 CHRONIC DIASTOLIC CHF (CONGESTIVE HEART FAILURE), NYHA CLASS 1 (HCC): Chronic | Status: ACTIVE | Noted: 2021-06-13

## 2021-06-13 PROBLEM — Z90.49 S/P LAPAROSCOPIC CHOLECYSTECTOMY: Status: RESOLVED | Noted: 2021-06-13 | Resolved: 2021-06-13

## 2021-06-13 PROBLEM — J96.01 ACUTE RESPIRATORY FAILURE WITH HYPOXEMIA (HCC): Status: RESOLVED | Noted: 2021-06-12 | Resolved: 2021-06-13

## 2021-06-13 PROBLEM — Z90.49 S/P LAPAROSCOPIC CHOLECYSTECTOMY: Status: ACTIVE | Noted: 2021-06-13

## 2021-06-13 PROCEDURE — A9270 NON-COVERED ITEM OR SERVICE: HCPCS | Performed by: STUDENT IN AN ORGANIZED HEALTH CARE EDUCATION/TRAINING PROGRAM

## 2021-06-13 PROCEDURE — 700111 HCHG RX REV CODE 636 W/ 250 OVERRIDE (IP): Performed by: INTERNAL MEDICINE

## 2021-06-13 PROCEDURE — 99239 HOSP IP/OBS DSCHRG MGMT >30: CPT | Performed by: INTERNAL MEDICINE

## 2021-06-13 PROCEDURE — 700102 HCHG RX REV CODE 250 W/ 637 OVERRIDE(OP): Performed by: STUDENT IN AN ORGANIZED HEALTH CARE EDUCATION/TRAINING PROGRAM

## 2021-06-13 RX ORDER — FUROSEMIDE 20 MG/1
20 TABLET ORAL DAILY
Qty: 30 TABLET | Refills: 2 | Status: SHIPPED | OUTPATIENT
Start: 2021-06-13 | End: 2021-07-13

## 2021-06-13 RX ORDER — AMOXICILLIN AND CLAVULANATE POTASSIUM 875; 125 MG/1; MG/1
1 TABLET, FILM COATED ORAL 2 TIMES DAILY
Qty: 10 TABLET | Refills: 0 | Status: SHIPPED | OUTPATIENT
Start: 2021-06-13 | End: 2021-06-18

## 2021-06-13 RX ORDER — ONDANSETRON 4 MG/1
4 TABLET, ORALLY DISINTEGRATING ORAL EVERY 8 HOURS PRN
Qty: 30 TABLET | Refills: 0 | Status: SHIPPED | OUTPATIENT
Start: 2021-06-13 | End: 2021-06-23

## 2021-06-13 RX ORDER — OXYCODONE HYDROCHLORIDE 5 MG/1
5 TABLET ORAL EVERY 4 HOURS PRN
Qty: 18 TABLET | Refills: 0 | Status: SHIPPED | OUTPATIENT
Start: 2021-06-13 | End: 2021-06-16

## 2021-06-13 RX ORDER — AMOXICILLIN 250 MG
2 CAPSULE ORAL 2 TIMES DAILY PRN
Qty: 40 TABLET | Refills: 0 | Status: SHIPPED | OUTPATIENT
Start: 2021-06-13 | End: 2021-06-23

## 2021-06-13 RX ORDER — POTASSIUM CHLORIDE 20 MEQ/1
20 TABLET, EXTENDED RELEASE ORAL DAILY
Qty: 30 TABLET | Refills: 2 | Status: SHIPPED | OUTPATIENT
Start: 2021-06-13 | End: 2021-07-13

## 2021-06-13 RX ADMIN — CEFTRIAXONE SODIUM 2 G: 10 INJECTION, POWDER, FOR SOLUTION INTRAVENOUS at 05:24

## 2021-06-13 RX ADMIN — ASPIRIN 81 MG: 81 TABLET, COATED ORAL at 05:19

## 2021-06-13 RX ADMIN — LEVETIRACETAM 500 MG: 500 TABLET ORAL at 05:19

## 2021-06-13 NOTE — DISCHARGE SUMMARY
Discharge Summary    CHIEF COMPLAINT ON ADMISSION  Chief Complaint   Patient presents with   • Abdominal Pain   • Headache       Reason for Admission  Abdominal Pain; Headache     Admission Date  6/7/2021    CODE STATUS  Full Code    HPI & HOSPITAL COURSE  60-year-old male with a past medical history of hyperlipidemia, COPD, CAD, thoracic aortic aneurysm, seizures presenting 6/7/2021 for worsening epigastric pain and nausea.  Patient noted this pain after he ate breakfast and the admission.  In the ED patient was found to have elevated LFTs, total bilirubin 2.4, lipase 905, temperature 100.9 °F, appeared septic but did not meet full criteria, CT scan showed gallbladder wall thickening, right upper quadrant ultrasound did not show acute cholecystitis.  MRCP showed cholelithiasis and sludge with gallbladder wall thickening, suggestive of acute cholecystitis as well as inflammation of the pancreatic head.  General surgery was consulted and pending cholecystectomy. Patient denied any alcohol use.    6/10 - s/p cholecystectomy    After procedure, patient did well. He still had soreness in his abdomen, was walking the hallways, passing stool on discharge, and short course of oxycodone was prescribed as needed use.    Patient's abnormal LFTs improved, showed signs of resolution. AST 40, ALT 62, ALP 53, TBili 0.6.    Overnight he required oxygen. After further evaluation, CXR showed pulmonary edema. Echo showed LVEF 65, but showed a new diastolic dysfunction grade 1.  Patient required IVF for his acute pancreatitis, which attributed to his acute hypoxic respiratory failure. Patient received IV lasix which significantly helped, and patient's hypoxia resolved.  Patient had cardiac bypass in the past and has not see a cardiologist in several years as his previous doctor left [place], was lost to follow up.    He did not require home oxygen, order was cancelled.  Patient given prescription for lasix and potassium,  instructions given on use and recommended patient to return to his cardiologist for adjustments, he acknowledged all risks, benefits and instructions regarding his diastolic HFpEF, hx of bypass and aortic valve replacement.    Patient was given 5 more days of PO Augmentin to complete 10-11 days of ABX. He received 3 days of Ceftriaxone after surgery, initially received 3 days of IV zosyn before surgery.    Therefore, he is discharged in good and stable condition to home with close outpatient follow-up.    The patient met 2-midnight criteria for an inpatient stay at the time of discharge.    Discharge Date  6/13/2021    FOLLOW UP ITEMS POST DISCHARGE  Listed below    DISCHARGE DIAGNOSES  Principal Problem:    Acute biliary pancreatitis without infection or necrosis POA: Yes  Active Problems:    Bacteremia with E. coli POA: Yes    Transaminitis POA: Yes    Bilirubinemia POA: Yes    Acute cholecystitis POA: Yes    History of severe aortic stenosis POA: Yes    History of heart valve replacement with bioprosthetic valve [Z95.4] POA: Yes      Overview: March 2017: AVR (27mm Dior Intuity pericardial valve), redo AVR (27mm       Dior Perimount Magna pericardial valve) and aortic ascending aneurysm       repair (34mm Hemashield tube graft) by Dr. White.      April 2018: Echocardiogram with normal LV size, mild concentric LVH, LVEF       60%. Normal function of AVR (peak 23mmHg, mean 13mmHg, Vmax 2.4m/s)..    Cardiac pacemaker in situ POA: Yes      Overview: December 2018: Medtronic Brea S DR BARNETT [id number] implanted by Dr. Spencer.    Chronic diastolic CHF (congestive heart failure), NYHA class 1 (HCC) (Chronic) POA: Yes      Overview: 6/13/21 - Echo Grade 1 diastolic dysfunction, LVEF 65%, normal systolic.       RVSP 20mmHg.    HLD (hyperlipidemia) POA: Yes    Class 1 obesity in adult POA: Yes    Seizure (HCC) POA: Yes    HTN (hypertension) POA: Yes  Resolved Problems:    Acute respiratory failure with hypoxemia (HCC)  POA: No    S/P laparoscopic cholecystectomy POA: No      Overview: 6/10/2021      FOLLOW UP  No future appointments.  PCP      Please obtain a PCP through your health insurance. Repeat CBC, CMP labs.    Amber Feliciano M.D.  [address] [facility] [address]  [place] [id number] [phone]    Schedule an appointment as soon as possible for a visit in 2 weeks  F/U for post surgery exam.    cardiologist      Please see with your insurance for PCP, continue to see cardiologist outpatient given hx of cardiac bypass and aortic valve replacement. Grade 1 diastolic dysfunction, d/c home now on lasix      MEDICATIONS ON DISCHARGE     Medication List      START taking these medications      Instructions   amoxicillin-clavulanate 875-125 MG Tabs  Commonly known as: AUGMENTIN   Take 1 tablet by mouth 2 times a day for 5 days.  Dose: 1 tablet     furosemide 20 MG Tabs  Commonly known as: LASIX   Take 1 tablet by mouth every day for 30 days. Take between 7AM and no later than 2PM  Dose: 20 mg     ondansetron 4 MG Tbdp  Commonly known as: ZOFRAN ODT   Take 1 tablet by mouth every 8 hours as needed for Nausea (give PO if no IV route available) for up to 10 days.  Dose: 4 mg     oxyCODONE immediate-release 5 MG Tabs  Commonly known as: ROXICODONE   Take 1 tablet by mouth every four hours as needed for up to 3 days.  Dose: 5 mg     potassium chloride SA 20 MEQ Tbcr  Commonly known as: Kdur   Take 1 tablet by mouth every day for 30 days. Take potassium when taking lasix  Dose: 20 mEq     senna-docusate 8.6-50 MG Tabs  Commonly known as: PERICOLACE or SENOKOT S   Take 2 Tablets by mouth 2 times a day as needed for Constipation for up to 10 days. Take medication while on oxycodone  Indications: Constipation  Dose: 2 tablet        CHANGE how you take these medications      Instructions   levETIRAcetam 500 MG Tabs  What changed:   · when to take this  · Another medication with the same name was removed. Continue taking this medication, and  follow the directions you see here.  Commonly known as: KEPPRA   Take 1 tablet by mouth twice daily        CONTINUE taking these medications      Instructions   aspirin EC 81 MG Tbec  Commonly known as: ECOTRIN   Take 1 tablet by mouth every day for 30 days.  Dose: 81 mg            Allergies  No Known Allergies    DIET  Toombs diet until wounds heal, avoid spicy foods, caffeine, alcohol, high content fatty foods    ACTIVITY  As tolerated.  Weight bearing as tolerated, limit lifting to 5-10lbs for 2-3 weeks while healing.    CONSULTATIONS  General Surgery - Dr. Feliciano    PROCEDURES  6/10 - dori olson    LABORATORY  Lab Results   Component Value Date    SODIUM 136 06/12/2021    POTASSIUM 4.1 06/12/2021    CHLORIDE 107 06/12/2021    CO2 21 06/12/2021    GLUCOSE 99 06/12/2021    BUN 11 06/12/2021    CREATININE 0.60 06/12/2021        Lab Results   Component Value Date    WBC 6.6 06/12/2021    HEMOGLOBIN 12.0 (L) 06/12/2021    HEMATOCRIT 36.0 (L) 06/12/2021    PLATELETCT 145 (L) 06/12/2021        Total time of the discharge process exceeds 37 minutes.  More than 50% of time was spent face to face with patient.  This included but not limited to review of hospital course with patient, treatment goals upon discharge, recommendations to PCP, continued and new medications and their adverse reactions and nursing instructions for patient.

## 2021-06-13 NOTE — PROGRESS NOTES
Pt left unit to discharge lounge. Personal belongings with pt when leaving the unit. Home medication returned to pt from pharmacy.

## 2021-06-13 NOTE — PROGRESS NOTES
"RX response to half dose given of Rocephin give remainder as soon as possible, can be given IM.  Pt states \"No.\"    "

## 2021-06-13 NOTE — PROGRESS NOTES
Pt received 10 ml's (1 GM) of his Rocephin syringe (2 GM) before his IV infiltrated.  Notified pharmacy.

## 2021-06-13 NOTE — DISCHARGE INSTRUCTIONS
Discharge Instructions    Discharged to home by car with relative. Discharged via wheelchair, hospital escort: Yes.  Special equipment needed: Not Applicable    Be sure to schedule a follow-up appointment with your primary care doctor or any specialists as instructed.     Discharge Plan:   Diet Plan: Discussed  Activity Level: Discussed  Confirmed Follow up Appointment: Patient to Call and Schedule Appointment  Confirmed Symptoms Management: Discussed  Medication Reconciliation Updated: Yes    I understand that a diet low in cholesterol, fat, and sodium is recommended for good health. Unless I have been given specific instructions below for another diet, I accept this instruction as my diet prescription.   Other diet: regular diet as tolerated    Special Instructions: None    · Is patient discharged on Warfarin / Coumadin?   No         Depression / Suicide Risk    As you are discharged from this [facility] facility, it is important to learn how to keep safe from harming yourself.    Recognize the warning signs:  · Abrupt changes in personality, positive or negative- including increase in energy   · Giving away possessions  · Change in eating patterns- significant weight changes-  positive or negative  · Change in sleeping patterns- unable to sleep or sleeping all the time   · Unwillingness or inability to communicate  · Depression  · Unusual sadness, discouragement and loneliness  · Talk of wanting to die  · Neglect of personal appearance   · Rebelliousness- reckless behavior  · Withdrawal from people/activities they love  · Confusion- inability to concentrate     If you or a loved one observes any of these behaviors or has concerns about self-harm, here's what you can do:  · Talk about it- your feelings and reasons for harming yourself  · Remove any means that you might use to hurt yourself (examples: pills, rope, extension cords, firearm)  · Get professional help from the community (Mental Health, Substance  Abuse, psychological counseling)  · Do not be alone:Call your Safe Contact- someone whom you trust who will be there for you.  · Call your local CRISIS HOTLINE 350-4925 or 378-097-1300  · Call your local Children's Mobile Crisis Response Team [place] (513) 919-7288 or www.meets  · Call the toll free National Suicide Prevention Hotlines   · National Suicide Prevention Lifeline 081-426-MHLD (1215)  · National Hope Line Network 800-SUICIDE (282-5535)

## 2021-06-13 NOTE — PROGRESS NOTES
Bedside report received. Pt is A&O X4, on RA during the day, 2L, NC while sleeping. VSS. Pt denies having any pain at the present time. He is able to ambulate by self no assistance.  Pt is being weaned of POC discussed with pt; all questions answered at this time.

## 2021-06-13 NOTE — PROGRESS NOTES
Assessment/description of ears? Pink, blanching, intact  Which preventative measures are in place for the ears?  Q shift skin check    Assessment/description of elbows? Pink, blanching, intact  Which preventative measures are in place for the elbows?  Pt ambulates, repositions self frequently    Assessment/description of sacrum? Pink, blanching, red circular sore on R buttock-open to air  Which preventative measures are in place for the sacrum?  Pt ambulates, repositions self frequently    Assessment/description of heels? Pink, blanching, intact  Which preventative measures are in place for the heels?  Pt ambulates, repositions self frequently    Which devices are in place? PIV  Description of skin under devices: CDI  Which preventative measures are in place under devices?    Other:  Abdomen has four injection site covered with gauze and tegaderm, no drainage present. Skin is pink, blanching, intact

## 2021-06-13 NOTE — PROGRESS NOTES
Assessment/description of ears?  Pink and blanching  Which preventative measures are in place for the ears?n/a     Assessment/description of elbows? Pink and blanching  Which preventative measures are in place for the elbows? Pt is able to turn self and offload     Assessment/description of sacrum? Pink, blanching,   Which preventative measures are in place for the sacrum? Patient is ambulatory and can turn self in bed.      Assessment/description of heels? Pink and blanching  Which preventative measures are in place for the heels? Patient is ambulatory, and can turn self.     Which devices are in place? PIV  Description of skin under devices: intact  Which preventative measures are in place under devices? n/a     Other: Four surgical sites to abdomen, dressing CDI. Dressing to hernia repair CDI, no bleeding noted.

## 2021-06-14 LAB
BACTERIA BLD CULT: NORMAL
BACTERIA BLD CULT: NORMAL
SIGNIFICANT IND 70042: NORMAL
SIGNIFICANT IND 70042: NORMAL
SITE SITE: NORMAL
SITE SITE: NORMAL
SOURCE SOURCE: NORMAL
SOURCE SOURCE: NORMAL

## 2021-06-23 NOTE — DOCUMENTATION QUERY
[facility]                                                                       Query Response Note      PATIENT:               ADDIE BEACH  ACCT #:                  [id number]  MRN:                     [id number]  :                      1960  ADMIT DATE:       2021 8:55 PM  DISCH DATE:        2021 11:12 AM  RESPONDING  PROVIDER #:        [id number]           QUERY TEXT:    Pulmonary Edema is documented in the Medical record.  Can the specific type of pulmonary edema be further specified.    NOTE:  If an appropriate response is not listed below, please respond with a new note    [email or web address] [email or web address]      The patient's Clinical Indicators include:  Patient with respiratory failure and congestive heart failure.    Clinical indicators: CXR showed pulmonary edema, WBC, bacteremia with E.coli, lipase 905, temp-100.9F    Treatment/monitoring: x-rays, cholecystectomy, labs and cultures monitored, iv antibiotics    Risks: acute respiratory failure, pancreatitis/cholecystitis, heart failure, HTN, hyperlipidemia  Options provided:   -- Acute pulmonary edema - cardiac related, (please specify type and acuity of CHF, if applicable, or other cardiac condition)   -- Acute pulmonary edema- non cardiogenic   -- Chronic pulmonary edema - cardiac related, (please specify type and acuity of CHF, if applicable, or other cardiac condition)   -- Chronic pulmonary edema - non cardiogenic   -- Unable to determine      Query created by: Petty Garcia on 2021 3:21 AM    RESPONSE TEXT:    Acute pulmonary edema- non cardiogenic          Electronically signed by:  JOHN FELIZ MD 2021 7:01 AM

## 2021-09-16 DIAGNOSIS — G40.909 SEIZURE DISORDER (HCC): ICD-10-CM

## 2021-09-17 RX ORDER — LEVETIRACETAM 500 MG/1
500 TABLET ORAL 2 TIMES DAILY
Qty: 60 TABLET | Refills: 5 | Status: SHIPPED | OUTPATIENT
Start: 2021-09-17 | End: 2022-03-28

## 2022-03-23 DIAGNOSIS — G40.909 SEIZURE DISORDER (HCC): ICD-10-CM

## 2022-03-28 RX ORDER — LEVETIRACETAM 500 MG/1
TABLET ORAL
Qty: 60 TABLET | Refills: 0 | Status: SHIPPED | OUTPATIENT
Start: 2022-03-28 | End: 2023-02-25

## 2022-04-02 ENCOUNTER — APPOINTMENT (OUTPATIENT)
Dept: RADIOLOGY | Facility: MEDICAL CENTER | Age: 62
End: 2022-04-02
Attending: EMERGENCY MEDICINE

## 2022-04-02 ENCOUNTER — HOSPITAL ENCOUNTER (OUTPATIENT)
Facility: MEDICAL CENTER | Age: 62
End: 2022-04-03
Attending: EMERGENCY MEDICINE | Admitting: STUDENT IN AN ORGANIZED HEALTH CARE EDUCATION/TRAINING PROGRAM

## 2022-04-02 PROBLEM — R07.9 CHEST PAIN: Status: ACTIVE | Noted: 2022-04-02

## 2022-04-02 LAB
ALBUMIN SERPL BCP-MCNC: 4.3 G/DL (ref 3.2–4.9)
ALBUMIN/GLOB SERPL: 2.2 G/DL
ALP SERPL-CCNC: 64 U/L (ref 30–99)
ALT SERPL-CCNC: 24 U/L (ref 2–50)
ANION GAP SERPL CALC-SCNC: 12 MMOL/L (ref 7–16)
AST SERPL-CCNC: 19 U/L (ref 12–45)
BASOPHILS # BLD AUTO: 0.9 % (ref 0–1.8)
BASOPHILS # BLD: 0.05 K/UL (ref 0–0.12)
BILIRUB SERPL-MCNC: 0.7 MG/DL (ref 0.1–1.5)
BUN SERPL-MCNC: 19 MG/DL (ref 8–22)
CALCIUM SERPL-MCNC: 8.6 MG/DL (ref 8.5–10.5)
CHLORIDE SERPL-SCNC: 106 MMOL/L (ref 96–112)
CO2 SERPL-SCNC: 23 MMOL/L (ref 20–33)
CREAT SERPL-MCNC: 0.65 MG/DL (ref 0.5–1.4)
D DIMER PPP IA.FEU-MCNC: 0.59 UG/ML (FEU) (ref 0–0.5)
EKG IMPRESSION: NORMAL
EOSINOPHIL # BLD AUTO: 0.08 K/UL (ref 0–0.51)
EOSINOPHIL NFR BLD: 1.4 % (ref 0–6.9)
ERYTHROCYTE [DISTWIDTH] IN BLOOD BY AUTOMATED COUNT: 36.3 FL (ref 35.9–50)
GFR SERPLBLD CREATININE-BSD FMLA CKD-EPI: 106 ML/MIN/1.73 M 2
GLOBULIN SER CALC-MCNC: 2 G/DL (ref 1.9–3.5)
GLUCOSE SERPL-MCNC: 148 MG/DL (ref 65–99)
HCT VFR BLD AUTO: 42.7 % (ref 42–52)
HGB BLD-MCNC: 15.2 G/DL (ref 14–18)
IMM GRANULOCYTES # BLD AUTO: 0.01 K/UL (ref 0–0.11)
IMM GRANULOCYTES NFR BLD AUTO: 0.2 % (ref 0–0.9)
LYMPHOCYTES # BLD AUTO: 1.46 K/UL (ref 1–4.8)
LYMPHOCYTES NFR BLD: 25.6 % (ref 22–41)
MAGNESIUM SERPL-MCNC: 2 MG/DL (ref 1.5–2.5)
MCH RBC QN AUTO: 29.1 PG (ref 27–33)
MCHC RBC AUTO-ENTMCNC: 35.6 G/DL (ref 33.7–35.3)
MCV RBC AUTO: 81.8 FL (ref 81.4–97.8)
MONOCYTES # BLD AUTO: 0.35 K/UL (ref 0–0.85)
MONOCYTES NFR BLD AUTO: 6.1 % (ref 0–13.4)
NEUTROPHILS # BLD AUTO: 3.76 K/UL (ref 1.82–7.42)
NEUTROPHILS NFR BLD: 65.8 % (ref 44–72)
NRBC # BLD AUTO: 0 K/UL
NRBC BLD-RTO: 0 /100 WBC
NT-PROBNP SERPL IA-MCNC: 141 PG/ML (ref 0–125)
PLATELET # BLD AUTO: 170 K/UL (ref 164–446)
PMV BLD AUTO: 9.8 FL (ref 9–12.9)
POTASSIUM SERPL-SCNC: 3.5 MMOL/L (ref 3.6–5.5)
PROT SERPL-MCNC: 6.3 G/DL (ref 6–8.2)
RBC # BLD AUTO: 5.22 M/UL (ref 4.7–6.1)
SODIUM SERPL-SCNC: 141 MMOL/L (ref 135–145)
TROPONIN T SERPL-MCNC: 14 NG/L (ref 6–19)
TROPONIN T SERPL-MCNC: 15 NG/L (ref 6–19)
WBC # BLD AUTO: 5.7 K/UL (ref 4.8–10.8)

## 2022-04-02 PROCEDURE — 93005 ELECTROCARDIOGRAM TRACING: CPT | Performed by: EMERGENCY MEDICINE

## 2022-04-02 PROCEDURE — 85025 COMPLETE CBC W/AUTO DIFF WBC: CPT

## 2022-04-02 PROCEDURE — 700117 HCHG RX CONTRAST REV CODE 255: Performed by: EMERGENCY MEDICINE

## 2022-04-02 PROCEDURE — 700102 HCHG RX REV CODE 250 W/ 637 OVERRIDE(OP): Performed by: STUDENT IN AN ORGANIZED HEALTH CARE EDUCATION/TRAINING PROGRAM

## 2022-04-02 PROCEDURE — 83735 ASSAY OF MAGNESIUM: CPT

## 2022-04-02 PROCEDURE — 83880 ASSAY OF NATRIURETIC PEPTIDE: CPT

## 2022-04-02 PROCEDURE — 99220 PR INITIAL OBSERVATION CARE,LEVL III: CPT | Performed by: STUDENT IN AN ORGANIZED HEALTH CARE EDUCATION/TRAINING PROGRAM

## 2022-04-02 PROCEDURE — 80053 COMPREHEN METABOLIC PANEL: CPT

## 2022-04-02 PROCEDURE — 93005 ELECTROCARDIOGRAM TRACING: CPT

## 2022-04-02 PROCEDURE — A9270 NON-COVERED ITEM OR SERVICE: HCPCS | Performed by: STUDENT IN AN ORGANIZED HEALTH CARE EDUCATION/TRAINING PROGRAM

## 2022-04-02 PROCEDURE — 84484 ASSAY OF TROPONIN QUANT: CPT

## 2022-04-02 PROCEDURE — G0378 HOSPITAL OBSERVATION PER HR: HCPCS

## 2022-04-02 PROCEDURE — 36415 COLL VENOUS BLD VENIPUNCTURE: CPT

## 2022-04-02 PROCEDURE — 74175 CTA ABDOMEN W/CONTRAST: CPT

## 2022-04-02 PROCEDURE — 71045 X-RAY EXAM CHEST 1 VIEW: CPT

## 2022-04-02 PROCEDURE — 99285 EMERGENCY DEPT VISIT HI MDM: CPT

## 2022-04-02 PROCEDURE — 85379 FIBRIN DEGRADATION QUANT: CPT

## 2022-04-02 RX ORDER — ACETAMINOPHEN 325 MG/1
650 TABLET ORAL EVERY 6 HOURS PRN
Status: DISCONTINUED | OUTPATIENT
Start: 2022-04-02 | End: 2022-04-03 | Stop reason: HOSPADM

## 2022-04-02 RX ORDER — LEVETIRACETAM 500 MG/1
500 TABLET ORAL 2 TIMES DAILY
Status: DISCONTINUED | OUTPATIENT
Start: 2022-04-02 | End: 2022-04-03 | Stop reason: HOSPADM

## 2022-04-02 RX ORDER — ASPIRIN 81 MG/1
324 TABLET, CHEWABLE ORAL DAILY
Status: DISCONTINUED | OUTPATIENT
Start: 2022-04-02 | End: 2022-04-03 | Stop reason: HOSPADM

## 2022-04-02 RX ORDER — ASPIRIN 325 MG
325 TABLET ORAL DAILY
Status: DISCONTINUED | OUTPATIENT
Start: 2022-04-02 | End: 2022-04-03 | Stop reason: HOSPADM

## 2022-04-02 RX ORDER — ONDANSETRON 4 MG/1
4 TABLET, ORALLY DISINTEGRATING ORAL EVERY 4 HOURS PRN
Status: DISCONTINUED | OUTPATIENT
Start: 2022-04-02 | End: 2022-04-03 | Stop reason: HOSPADM

## 2022-04-02 RX ORDER — ONDANSETRON 2 MG/ML
4 INJECTION INTRAMUSCULAR; INTRAVENOUS EVERY 4 HOURS PRN
Status: DISCONTINUED | OUTPATIENT
Start: 2022-04-02 | End: 2022-04-03 | Stop reason: HOSPADM

## 2022-04-02 RX ORDER — PROMETHAZINE HYDROCHLORIDE 25 MG/1
12.5-25 SUPPOSITORY RECTAL EVERY 4 HOURS PRN
Status: DISCONTINUED | OUTPATIENT
Start: 2022-04-02 | End: 2022-04-03 | Stop reason: HOSPADM

## 2022-04-02 RX ORDER — REGADENOSON 0.08 MG/ML
0.4 INJECTION, SOLUTION INTRAVENOUS
Status: DISCONTINUED | OUTPATIENT
Start: 2022-04-02 | End: 2022-04-03 | Stop reason: HOSPADM

## 2022-04-02 RX ORDER — PROMETHAZINE HYDROCHLORIDE 25 MG/1
12.5-25 TABLET ORAL EVERY 4 HOURS PRN
Status: DISCONTINUED | OUTPATIENT
Start: 2022-04-02 | End: 2022-04-03 | Stop reason: HOSPADM

## 2022-04-02 RX ORDER — PROCHLORPERAZINE EDISYLATE 5 MG/ML
5-10 INJECTION INTRAMUSCULAR; INTRAVENOUS EVERY 4 HOURS PRN
Status: DISCONTINUED | OUTPATIENT
Start: 2022-04-02 | End: 2022-04-03 | Stop reason: HOSPADM

## 2022-04-02 RX ORDER — POLYETHYLENE GLYCOL 3350 17 G/17G
1 POWDER, FOR SOLUTION ORAL
Status: DISCONTINUED | OUTPATIENT
Start: 2022-04-02 | End: 2022-04-03 | Stop reason: HOSPADM

## 2022-04-02 RX ORDER — AMINOPHYLLINE 25 MG/ML
100 INJECTION, SOLUTION INTRAVENOUS
Status: DISCONTINUED | OUTPATIENT
Start: 2022-04-02 | End: 2022-04-03 | Stop reason: HOSPADM

## 2022-04-02 RX ORDER — BISACODYL 10 MG
10 SUPPOSITORY, RECTAL RECTAL
Status: DISCONTINUED | OUTPATIENT
Start: 2022-04-02 | End: 2022-04-03 | Stop reason: HOSPADM

## 2022-04-02 RX ORDER — AMOXICILLIN 250 MG
2 CAPSULE ORAL 2 TIMES DAILY
Status: DISCONTINUED | OUTPATIENT
Start: 2022-04-02 | End: 2022-04-03 | Stop reason: HOSPADM

## 2022-04-02 RX ORDER — ASPIRIN 300 MG/1
300 SUPPOSITORY RECTAL DAILY
Status: DISCONTINUED | OUTPATIENT
Start: 2022-04-02 | End: 2022-04-03 | Stop reason: HOSPADM

## 2022-04-02 RX ORDER — HEPARIN SODIUM 5000 [USP'U]/ML
5000 INJECTION, SOLUTION INTRAVENOUS; SUBCUTANEOUS EVERY 8 HOURS
Status: DISCONTINUED | OUTPATIENT
Start: 2022-04-02 | End: 2022-04-03 | Stop reason: HOSPADM

## 2022-04-02 RX ADMIN — ASPIRIN 325 MG: 325 TABLET ORAL at 18:09

## 2022-04-02 RX ADMIN — IOHEXOL 100 ML: 300 INJECTION, SOLUTION INTRAVENOUS at 16:15

## 2022-04-02 RX ADMIN — LEVETIRACETAM 500 MG: 500 TABLET, FILM COATED ORAL at 18:08

## 2022-04-02 ASSESSMENT — ENCOUNTER SYMPTOMS
FOCAL WEAKNESS: 0
LOSS OF CONSCIOUSNESS: 0
CHILLS: 0
SHORTNESS OF BREATH: 0
DIZZINESS: 0
PALPITATIONS: 0
VOMITING: 0
EYE PAIN: 0
FEVER: 0
BLURRED VISION: 0
NAUSEA: 0
SENSORY CHANGE: 0
FALLS: 0
HEADACHES: 0
SEIZURES: 0
ABDOMINAL PAIN: 0
BACK PAIN: 0
COUGH: 0
INSOMNIA: 0

## 2022-04-02 ASSESSMENT — LIFESTYLE VARIABLES: SUBSTANCE_ABUSE: 0

## 2022-04-02 ASSESSMENT — FIBROSIS 4 INDEX: FIB4 SCORE: 2.17

## 2022-04-02 ASSESSMENT — PAIN DESCRIPTION - PAIN TYPE: TYPE: ACUTE PAIN

## 2022-04-02 NOTE — ED NOTES
Chief Complaint   Patient presents with   • Chest Pain     Woke up last night having sharp chest pain in mid chest lasted x less than a minute. Has hx of CABG/AVR. Arrived chest pain free. Denies sob/dizziness.     NAD. Skin pwd. Educated on triage process. Instructed to notify staff for any worsening symptoms. Denies any recent travel. Denies exposure to known covid positive patients. Denies any respiratory symptoms.
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
## 2022-04-03 ENCOUNTER — APPOINTMENT (OUTPATIENT)
Dept: RADIOLOGY | Facility: MEDICAL CENTER | Age: 62
End: 2022-04-03
Attending: STUDENT IN AN ORGANIZED HEALTH CARE EDUCATION/TRAINING PROGRAM

## 2022-04-03 ENCOUNTER — APPOINTMENT (OUTPATIENT)
Dept: CARDIOLOGY | Facility: MEDICAL CENTER | Age: 62
End: 2022-04-03
Attending: STUDENT IN AN ORGANIZED HEALTH CARE EDUCATION/TRAINING PROGRAM

## 2022-04-03 VITALS
TEMPERATURE: 97.8 F | WEIGHT: 221.34 LBS | HEART RATE: 71 BPM | RESPIRATION RATE: 16 BRPM | HEIGHT: 70 IN | OXYGEN SATURATION: 94 % | BODY MASS INDEX: 31.69 KG/M2 | DIASTOLIC BLOOD PRESSURE: 90 MMHG | SYSTOLIC BLOOD PRESSURE: 168 MMHG

## 2022-04-03 PROBLEM — R07.9 CHEST PAIN: Status: RESOLVED | Noted: 2022-04-02 | Resolved: 2022-04-03

## 2022-04-03 LAB
ANION GAP SERPL CALC-SCNC: 11 MMOL/L (ref 7–16)
BUN SERPL-MCNC: 17 MG/DL (ref 8–22)
CALCIUM SERPL-MCNC: 8.6 MG/DL (ref 8.5–10.5)
CHLORIDE SERPL-SCNC: 108 MMOL/L (ref 96–112)
CHOLEST SERPL-MCNC: 121 MG/DL (ref 100–199)
CO2 SERPL-SCNC: 24 MMOL/L (ref 20–33)
CREAT SERPL-MCNC: 0.71 MG/DL (ref 0.5–1.4)
EKG IMPRESSION: NORMAL
ERYTHROCYTE [DISTWIDTH] IN BLOOD BY AUTOMATED COUNT: 38.1 FL (ref 35.9–50)
GFR SERPLBLD CREATININE-BSD FMLA CKD-EPI: 103 ML/MIN/1.73 M 2
GLUCOSE SERPL-MCNC: 93 MG/DL (ref 65–99)
HCT VFR BLD AUTO: 44.2 % (ref 42–52)
HDLC SERPL-MCNC: 25 MG/DL
HGB BLD-MCNC: 14.6 G/DL (ref 14–18)
LDLC SERPL CALC-MCNC: 77 MG/DL
MCH RBC QN AUTO: 28.4 PG (ref 27–33)
MCHC RBC AUTO-ENTMCNC: 33 G/DL (ref 33.7–35.3)
MCV RBC AUTO: 86 FL (ref 81.4–97.8)
PLATELET # BLD AUTO: 164 K/UL (ref 164–446)
PMV BLD AUTO: 10.1 FL (ref 9–12.9)
POTASSIUM SERPL-SCNC: 3.8 MMOL/L (ref 3.6–5.5)
RBC # BLD AUTO: 5.14 M/UL (ref 4.7–6.1)
SODIUM SERPL-SCNC: 143 MMOL/L (ref 135–145)
TRIGL SERPL-MCNC: 95 MG/DL (ref 0–149)
TROPONIN T SERPL-MCNC: 15 NG/L (ref 6–19)
WBC # BLD AUTO: 4.6 K/UL (ref 4.8–10.8)

## 2022-04-03 PROCEDURE — G0378 HOSPITAL OBSERVATION PER HR: HCPCS

## 2022-04-03 PROCEDURE — 80048 BASIC METABOLIC PNL TOTAL CA: CPT

## 2022-04-03 PROCEDURE — 700102 HCHG RX REV CODE 250 W/ 637 OVERRIDE(OP): Performed by: STUDENT IN AN ORGANIZED HEALTH CARE EDUCATION/TRAINING PROGRAM

## 2022-04-03 PROCEDURE — 93306 TTE W/DOPPLER COMPLETE: CPT | Mod: 26 | Performed by: INTERNAL MEDICINE

## 2022-04-03 PROCEDURE — A9270 NON-COVERED ITEM OR SERVICE: HCPCS | Performed by: STUDENT IN AN ORGANIZED HEALTH CARE EDUCATION/TRAINING PROGRAM

## 2022-04-03 PROCEDURE — A9502 TC99M TETROFOSMIN: HCPCS

## 2022-04-03 PROCEDURE — 93010 ELECTROCARDIOGRAM REPORT: CPT | Performed by: INTERNAL MEDICINE

## 2022-04-03 PROCEDURE — 99217 PR OBSERVATION CARE DISCHARGE: CPT | Mod: FS | Performed by: INTERNAL MEDICINE

## 2022-04-03 PROCEDURE — 80061 LIPID PANEL: CPT

## 2022-04-03 PROCEDURE — 84484 ASSAY OF TROPONIN QUANT: CPT

## 2022-04-03 PROCEDURE — 85027 COMPLETE CBC AUTOMATED: CPT

## 2022-04-03 PROCEDURE — 93306 TTE W/DOPPLER COMPLETE: CPT

## 2022-04-03 RX ADMIN — ASPIRIN 325 MG: 325 TABLET ORAL at 05:06

## 2022-04-03 RX ADMIN — LEVETIRACETAM 500 MG: 500 TABLET, FILM COATED ORAL at 05:06

## 2022-04-03 ASSESSMENT — PAIN DESCRIPTION - PAIN TYPE
TYPE: ACUTE PAIN

## 2022-04-03 NOTE — PROGRESS NOTES
Pt back from nuc med. Tele monitor on. Pt provided with breakfast tray. Echo notified pt back on floor.

## 2022-04-03 NOTE — ED NOTES
Pharmacy Medication Reconciliation      ~Medication reconciliation updated and complete per patient at bedside with Rx bottle. Reviewed Rx bottle and returned at bedside  ~Allergies have been verified   ~No oral ABX within the last 30 days  ~Patient home pharmacy:[facility]

## 2022-04-03 NOTE — ASSESSMENT & PLAN NOTE
Not on any medication at home  BP elevated here  Monitor, consider starting antihypertensive medication if remains elevated  Echocardiogram ordered

## 2022-04-03 NOTE — HOSPITAL COURSE
Mr. Flex Perrin is a 61-year-old male with a PMHx of TAA s/p repair, aortic stenosis s/p AVR, complicated AV block s/p PPM, CAD, who presented on 4/2/2022 due to chest pain.    Patient reports chest pain as sharp in nature which woke him up from sleep a couple nights ago.  Chest pain lasted 2 seconds and then dissipated.  This has not recurred.  Patient denies any dyspnea, no cough, no hemoptysis, no abdominal pain, no nausea, no leg swelling or no syncope.  Patient reports that he supposed to follow-up with cardiologist, but this has not been scheduled as of yet.  Patient states that he stays active and jogs daily.  He denies any history of heart attack, no stroke, and no blood clots.    In ER, notable findings shows /88 with other vital signs stable.  Blood work noted K3.5, glucose 148, troponin T 14.  Initial EKG noted AFB.  CTA area are also noted no aortic aneurysm or dissection.  Initial CXR notes no cardiopulmonary process.  Patient admitted overnight into the observation unit for ACS rule out.    During this hospital stay, cardiac stress test was obtained along with an echocardiogram.  Cardiac stress test noted no evidence of significant jeopardized viable myocardium or prior myocardial infarction.  Echocardiogram was also obtained which noted LVEF approximately 65-70%    Patient seen and examined prior to being discharged.  Discussed with patient results from tests.  Patient highly encouraged to follow-up with PCP s/p hospitalization along with following up with cardiology as indicated.  Patient to resume all home medication.  All questions and concerns answered prior to being discharged.  Patient discharged home.

## 2022-04-03 NOTE — ASSESSMENT & PLAN NOTE
Presents for atypical sharp chest pain, now resolved  Hx of thoracic aortic aneurysm s/p repair, aortic stenosis s/p AVR, complicated by AV block and syncope s/p PPM  Per chart review patient has hx CAD?, records reviewed do not show CAD evaluation  EKG shows new LAFB  Troponin normal  Admit to telemetry  Echocardiogram ordered  Cardiac stress test in AM  Lipid panel, A1c, aspirin

## 2022-04-03 NOTE — PROGRESS NOTES
Assumed care of patient. Patient is aox4, denies any chest pain at this time. Patient sinus rhythm on tele monitor. Patient updated on plan of care. All needs met at this time. Bed locked and in lowest position. Call light and personal belongings within reach.
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
## 2022-04-03 NOTE — DISCHARGE SUMMARY
Discharge Summary    CHIEF COMPLAINT ON ADMISSION  Chief Complaint   Patient presents with   • Chest Pain     Woke up last night having sharp chest pain in mid chest lasted x less than a minute. Has hx of CABG/AVR. Arrived chest pain free. Denies sob/dizziness.       Reason for Admission  Chest Pain     Admission Date  4/2/2022    CODE STATUS  Full Code    HPI & HOSPITAL COURSE    Mr. Flex Perrin is a 61-year-old male with a PMHx of TAA s/p repair, aortic stenosis s/p AVR, complicated AV block s/p PPM, CAD, who presented on 4/2/2022 due to chest pain.    Patient reports chest pain as sharp in nature which woke him up from sleep a couple nights ago.  Chest pain lasted 2 seconds and then dissipated.  This has not recurred.  Patient denies any dyspnea, no cough, no hemoptysis, no abdominal pain, no nausea, no leg swelling or no syncope.  Patient reports that he supposed to follow-up with cardiologist, but this has not been scheduled as of yet.  Patient states that he stays active and jogs daily.  He denies any history of heart attack, no stroke, and no blood clots.    In ER, notable findings shows /88 with other vital signs stable.  Blood work noted K3.5, glucose 148, troponin T 14.  Initial EKG noted AFB.  CTA area are also noted no aortic aneurysm or dissection.  Initial CXR notes no cardiopulmonary process.  Patient admitted overnight into the observation unit for ACS rule out.    During this hospital stay, cardiac stress test was obtained along with an echocardiogram.  Cardiac stress test noted no evidence of significant jeopardized viable myocardium or prior myocardial infarction.  Echocardiogram was also obtained which noted LVEF approximately 65-70%    Patient seen and examined prior to being discharged.  Discussed with patient results from tests.  Patient highly encouraged to follow-up with PCP s/p hospitalization along with following up with cardiology as indicated.  Patient to resume all home medication.   All questions and concerns answered prior to being discharged.  Patient discharged home.      Therefore, he is discharged in good and stable condition to home with close outpatient follow-up.    The patient recovered much more quickly than anticipated on admission.    Discharge Date  04/03/22      FOLLOW UP ITEMS POST DISCHARGE  Please call [phone] to schedule PCP appointment for patient.    Required specialty appointments include:       Discharge Instructions per KIANA Toussaint    -Follow-up with PCP, Dr. Aguilera in [place]  -Follow-up with cardiology  -Resume all home medication    DIET: Cardiac    ACTIVITY: As tolerated    DIAGNOSIS: Chest pain    Return to ER if symptoms persist, chest pain, palpitations, shortness of breath, numbness, tingling, weakness, and high fevers.    DISCHARGE DIAGNOSES  Principal Problem (Resolved):    Chest pain POA: Yes  Active Problems:    Seizure (HCC) POA: Yes    Hypertension POA: Unknown      FOLLOW UP    KIANA Correa  [address] [facility] [id number] [phone]    Schedule an appointment as soon as possible for a visit        MEDICATIONS ON DISCHARGE     Medication List      CONTINUE taking these medications      Instructions   levETIRAcetam 500 MG Tabs  Commonly known as: KEPPRA   Take 1 tablet by mouth twice daily     MULTIVITAMIN PO   Take 1 Tablet by mouth every day.  Dose: 1 Tablet            Allergies  No Known Allergies    DIET  Orders Placed This Encounter   Procedures   • Diet Order Diet: Cardiac; Miscellaneous modifications: (optional): No Decaf, No Caffeine(for test)     Standing Status:   Standing     Number of Occurrences:   1     Order Specific Question:   Diet:     Answer:   Cardiac [6]     Order Specific Question:   Miscellaneous modifications: (optional)     Answer:   No Decaf, No Caffeine(for test) [11]       ACTIVITY  As tolerated.  Weight bearing as  tolerated    CONSULTATIONS  NONE    PROCEDURES  NONE    IMAGING  NM-CARDIAC STRESS TEST   Final Result      DX-CHEST-PORTABLE (1 VIEW)   Final Result      1.  Stable cardiomegaly.   2.  No acute abnormality.      CT-CTA COMPLETE THORACOABDOMINAL AORTA   Final Result      1.  Fusiform 2.6 cm left common iliac artery aneurysm.   2.  No aortic aneurysm or dissection.   3.  Aortic valve prosthesis.   4.  Cardiomegaly with cardiac pacer present.   5.  Emphysematous changes.   6.  Colonic diverticulosis without acute diverticulitis.            EC-ECHOCARDIOGRAM COMPLETE W/O CONT    (Results Pending)         LABORATORY  Lab Results   Component Value Date    SODIUM 143 04/03/2022    POTASSIUM 3.8 04/03/2022    CHLORIDE 108 04/03/2022    CO2 24 04/03/2022    GLUCOSE 93 04/03/2022    BUN 17 04/03/2022    CREATININE 0.71 04/03/2022        Lab Results   Component Value Date    WBC 4.6 (L) 04/03/2022    HEMOGLOBIN 14.6 04/03/2022    HEMATOCRIT 44.2 04/03/2022    PLATELETCT 164 04/03/2022        Total time of the discharge process exceeds 36 minutes.    ============================================================================================================  Please note that this dictation was created using voice recognition software. I have made every reasonable attempt to correct obvious errors, but there may be errors of grammar and possibly content that I did not discover before finalizing the note.    Electronically signed by:  LILIAM Peterson, MSN, APRN, FNP-C  Hospitalist Services  [facility]  [phone]  [email or web address]  04/03/22                [id number]

## 2022-04-03 NOTE — CARE PLAN
The patient is Stable - Low risk of patient condition declining or worsening    Shift Goals  Clinical Goals: stres test, trend trops  Patient Goals: rest    Progress made toward(s) clinical / shift goals:      Problem: Knowledge Deficit - Standard  Goal: Patient and family/care givers will demonstrate understanding of plan of care, disease process/condition, diagnostic tests and medications  Outcome: Progressing     Patient is not progressing towards the following goals:

## 2022-04-03 NOTE — ED NOTES
Pt transported up to CDU with 1 ACLS RN. All personal belongings taken with patient up to assigned room.

## 2022-04-04 NOTE — DISCHARGE INSTRUCTIONS
Discharge Instructions    Discharged to home by car with relative. Discharged via wheelchair, hospital escort: Yes.  Special equipment needed: Not Applicable    Be sure to schedule a follow-up appointment with your primary care doctor or any specialists as instructed.     Discharge Plan:   Diet Plan: Discussed  Activity Level: Discussed  Confirmed Follow up Appointment: Patient to Call and Schedule Appointment  Confirmed Symptoms Management: Discussed  Medication Reconciliation Updated: Yes    I understand that a diet low in cholesterol, fat, and sodium is recommended for good health. Unless I have been given specific instructions below for another diet, I accept this instruction as my diet prescription.   Other diet: Regular    Special Instructions: None    · Is patient discharged on Warfarin / Coumadin?   No     Depression / Suicide Risk    As you are discharged from this [facility] facility, it is important to learn how to keep safe from harming yourself.    Recognize the warning signs:  · Abrupt changes in personality, positive or negative- including increase in energy   · Giving away possessions  · Change in eating patterns- significant weight changes-  positive or negative  · Change in sleeping patterns- unable to sleep or sleeping all the time   · Unwillingness or inability to communicate  · Depression  · Unusual sadness, discouragement and loneliness  · Talk of wanting to die  · Neglect of personal appearance   · Rebelliousness- reckless behavior  · Withdrawal from people/activities they love  · Confusion- inability to concentrate     If you or a loved one observes any of these behaviors or has concerns about self-harm, here's what you can do:  · Talk about it- your feelings and reasons for harming yourself  · Remove any means that you might use to hurt yourself (examples: pills, rope, extension cords, firearm)  · Get professional help from the community (Mental Health, Substance Abuse, psychological  counseling)  · Do not be alone:Call your Safe Contact- someone whom you trust who will be there for you.  · Call your local CRISIS HOTLINE 280-2891 or 429-513-3877  · Call your local Children's Mobile Crisis Response Team [place] (532) 827-4243 or www.Anctu  · Call the toll free National Suicide Prevention Hotlines   · National Suicide Prevention Lifeline 743-597-CJJP (7973)  · [employer] Line Network 800-SUICIDE (095-4339)    FOLLOW UP ITEMS POST DISCHARGE  Please call [phone] to schedule PCP appointment for patient.    Required specialty appointments include:       Discharge Instructions per GILLIAN ToussaintRDorisNDoris    -Follow-up with PCP, Dr. Aguilera in [place]  -Follow-up with cardiology  -Resume all home medication    DIET: Cardiac    ACTIVITY: As tolerated    DIAGNOSIS: Chest pain    Return to ER if symptoms persist, chest pain, palpitations, shortness of breath, numbness, tingling, weakness, and high fevers.

## 2023-02-25 ENCOUNTER — APPOINTMENT (OUTPATIENT)
Dept: RADIOLOGY | Facility: MEDICAL CENTER | Age: 63
DRG: 101 | End: 2023-02-25
Attending: EMERGENCY MEDICINE
Payer: MEDICAID

## 2023-02-25 ENCOUNTER — APPOINTMENT (OUTPATIENT)
Dept: RADIOLOGY | Facility: MEDICAL CENTER | Age: 63
DRG: 101 | End: 2023-02-25
Payer: MEDICAID

## 2023-02-25 ENCOUNTER — HOSPITAL ENCOUNTER (INPATIENT)
Facility: MEDICAL CENTER | Age: 63
LOS: 1 days | DRG: 101 | End: 2023-02-28
Attending: EMERGENCY MEDICINE | Admitting: GENERAL PRACTICE
Payer: MEDICAID

## 2023-02-25 DIAGNOSIS — G40.309 EPILEPSY SEIZURE, GENERALIZED, CONVULSIVE (HCC): ICD-10-CM

## 2023-02-25 DIAGNOSIS — Z95.0 CARDIAC PACEMAKER IN SITU: ICD-10-CM

## 2023-02-25 DIAGNOSIS — I48.91 ATRIAL FIBRILLATION, UNSPECIFIED TYPE (HCC): ICD-10-CM

## 2023-02-25 DIAGNOSIS — R40.4 TRANSIENT ALTERATION OF AWARENESS: ICD-10-CM

## 2023-02-25 PROBLEM — R41.0 CONFUSION AND DISORIENTATION: Status: ACTIVE | Noted: 2023-02-25

## 2023-02-25 LAB
ALBUMIN SERPL BCP-MCNC: 4.4 G/DL (ref 3.2–4.9)
ALBUMIN/GLOB SERPL: 2.1 G/DL
ALP SERPL-CCNC: 66 U/L (ref 30–99)
ALT SERPL-CCNC: 34 U/L (ref 2–50)
AMPHET UR QL SCN: NEGATIVE
ANION GAP SERPL CALC-SCNC: 11 MMOL/L (ref 7–16)
AST SERPL-CCNC: 26 U/L (ref 12–45)
BARBITURATES UR QL SCN: NEGATIVE
BASOPHILS # BLD AUTO: 0.6 % (ref 0–1.8)
BASOPHILS # BLD: 0.04 K/UL (ref 0–0.12)
BENZODIAZ UR QL SCN: NEGATIVE
BILIRUB SERPL-MCNC: 0.6 MG/DL (ref 0.1–1.5)
BUN SERPL-MCNC: 19 MG/DL (ref 8–22)
BZE UR QL SCN: NEGATIVE
CALCIUM ALBUM COR SERPL-MCNC: 8.3 MG/DL (ref 8.5–10.5)
CALCIUM SERPL-MCNC: 8.6 MG/DL (ref 8.5–10.5)
CANNABINOIDS UR QL SCN: NEGATIVE
CHLORIDE SERPL-SCNC: 106 MMOL/L (ref 96–112)
CO2 SERPL-SCNC: 22 MMOL/L (ref 20–33)
CREAT SERPL-MCNC: 0.85 MG/DL (ref 0.5–1.4)
EKG IMPRESSION: NORMAL
EOSINOPHIL # BLD AUTO: 0.09 K/UL (ref 0–0.51)
EOSINOPHIL NFR BLD: 1.4 % (ref 0–6.9)
ERYTHROCYTE [DISTWIDTH] IN BLOOD BY AUTOMATED COUNT: 36.3 FL (ref 35.9–50)
ETHANOL BLD-MCNC: <10.1 MG/DL
GFR SERPLBLD CREATININE-BSD FMLA CKD-EPI: 97 ML/MIN/1.73 M 2
GLOBULIN SER CALC-MCNC: 2.1 G/DL (ref 1.9–3.5)
GLUCOSE BLD STRIP.AUTO-MCNC: 142 MG/DL (ref 65–99)
GLUCOSE SERPL-MCNC: 149 MG/DL (ref 65–99)
HCT VFR BLD AUTO: 43.5 % (ref 42–52)
HGB BLD-MCNC: 15 G/DL (ref 14–18)
IMM GRANULOCYTES # BLD AUTO: 0.03 K/UL (ref 0–0.11)
IMM GRANULOCYTES NFR BLD AUTO: 0.5 % (ref 0–0.9)
LYMPHOCYTES # BLD AUTO: 1.59 K/UL (ref 1–4.8)
LYMPHOCYTES NFR BLD: 24.9 % (ref 22–41)
MCH RBC QN AUTO: 29.1 PG (ref 27–33)
MCHC RBC AUTO-ENTMCNC: 34.5 G/DL (ref 33.7–35.3)
MCV RBC AUTO: 84.3 FL (ref 81.4–97.8)
METHADONE UR QL SCN: NEGATIVE
MONOCYTES # BLD AUTO: 0.4 K/UL (ref 0–0.85)
MONOCYTES NFR BLD AUTO: 6.3 % (ref 0–13.4)
NEUTROPHILS # BLD AUTO: 4.23 K/UL (ref 1.82–7.42)
NEUTROPHILS NFR BLD: 66.3 % (ref 44–72)
NRBC # BLD AUTO: 0 K/UL
NRBC BLD-RTO: 0 /100 WBC
OPIATES UR QL SCN: NEGATIVE
OXYCODONE UR QL SCN: NEGATIVE
PCP UR QL SCN: NEGATIVE
PLATELET # BLD AUTO: 186 K/UL (ref 164–446)
PMV BLD AUTO: 10.2 FL (ref 9–12.9)
POTASSIUM SERPL-SCNC: 3.9 MMOL/L (ref 3.6–5.5)
PROPOXYPH UR QL SCN: NEGATIVE
PROT SERPL-MCNC: 6.5 G/DL (ref 6–8.2)
RBC # BLD AUTO: 5.16 M/UL (ref 4.7–6.1)
SODIUM SERPL-SCNC: 139 MMOL/L (ref 135–145)
WBC # BLD AUTO: 6.4 K/UL (ref 4.8–10.8)

## 2023-02-25 PROCEDURE — 93005 ELECTROCARDIOGRAM TRACING: CPT

## 2023-02-25 PROCEDURE — 99285 EMERGENCY DEPT VISIT HI MDM: CPT

## 2023-02-25 PROCEDURE — 70450 CT HEAD/BRAIN W/O DYE: CPT

## 2023-02-25 PROCEDURE — 99223 1ST HOSP IP/OBS HIGH 75: CPT | Performed by: GENERAL PRACTICE

## 2023-02-25 PROCEDURE — G0378 HOSPITAL OBSERVATION PER HR: HCPCS

## 2023-02-25 PROCEDURE — 85025 COMPLETE CBC W/AUTO DIFF WBC: CPT

## 2023-02-25 PROCEDURE — 71045 X-RAY EXAM CHEST 1 VIEW: CPT

## 2023-02-25 PROCEDURE — 80307 DRUG TEST PRSMV CHEM ANLYZR: CPT

## 2023-02-25 PROCEDURE — 93005 ELECTROCARDIOGRAM TRACING: CPT | Performed by: EMERGENCY MEDICINE

## 2023-02-25 PROCEDURE — 82962 GLUCOSE BLOOD TEST: CPT

## 2023-02-25 PROCEDURE — 82077 ASSAY SPEC XCP UR&BREATH IA: CPT

## 2023-02-25 PROCEDURE — 36415 COLL VENOUS BLD VENIPUNCTURE: CPT

## 2023-02-25 PROCEDURE — 80053 COMPREHEN METABOLIC PANEL: CPT

## 2023-02-25 RX ORDER — LORAZEPAM 2 MG/ML
1 INJECTION INTRAMUSCULAR
Status: DISCONTINUED | OUTPATIENT
Start: 2023-02-25 | End: 2023-02-28 | Stop reason: HOSPADM

## 2023-02-25 RX ORDER — ACETAMINOPHEN 325 MG/1
650 TABLET ORAL EVERY 6 HOURS PRN
Status: DISCONTINUED | OUTPATIENT
Start: 2023-02-25 | End: 2023-02-28 | Stop reason: HOSPADM

## 2023-02-25 RX ORDER — LORAZEPAM 2 MG/ML
0.5 INJECTION INTRAMUSCULAR
Status: DISCONTINUED | OUTPATIENT
Start: 2023-02-25 | End: 2023-02-25

## 2023-02-25 ASSESSMENT — FIBROSIS 4 INDEX
FIB4 SCORE: 1.51
FIB4 SCORE: 1.84

## 2023-02-25 ASSESSMENT — LIFESTYLE VARIABLES
HAVE YOU EVER FELT YOU SHOULD CUT DOWN ON YOUR DRINKING: NO
HOW MANY TIMES IN THE PAST YEAR HAVE YOU HAD 5 OR MORE DRINKS IN A DAY: 0
TOTAL SCORE: 0
CONSUMPTION TOTAL: NEGATIVE
ON A TYPICAL DAY WHEN YOU DRINK ALCOHOL HOW MANY DRINKS DO YOU HAVE: 0
AVERAGE NUMBER OF DAYS PER WEEK YOU HAVE A DRINK CONTAINING ALCOHOL: 0
HAVE PEOPLE ANNOYED YOU BY CRITICIZING YOUR DRINKING: NO
TOTAL SCORE: 0
DOES PATIENT WANT TO STOP DRINKING: NO
ALCOHOL_USE: NO
EVER FELT BAD OR GUILTY ABOUT YOUR DRINKING: NO
TOTAL SCORE: 0
EVER HAD A DRINK FIRST THING IN THE MORNING TO STEADY YOUR NERVES TO GET RID OF A HANGOVER: NO

## 2023-02-25 ASSESSMENT — PATIENT HEALTH QUESTIONNAIRE - PHQ9
1. LITTLE INTEREST OR PLEASURE IN DOING THINGS: NOT AT ALL
2. FEELING DOWN, DEPRESSED, IRRITABLE, OR HOPELESS: NOT AT ALL
SUM OF ALL RESPONSES TO PHQ9 QUESTIONS 1 AND 2: 0

## 2023-02-25 ASSESSMENT — PAIN DESCRIPTION - PAIN TYPE: TYPE: ACUTE PAIN

## 2023-02-25 NOTE — ED TRIAGE NOTES
"Chief Complaint   Patient presents with    ALOC     On going for 3 weeks - worse today     Slurred Speech    Unsteady Gait     Pt to triage with family. Family states pt has been more confused with slurred speech and unsteady gait for the last 3 weeks. Pt was worse this morning which is what prompted them to bring him in. Stroke scale negative.  in triage.     Pt is alert and oriented, speaking in full sentences, follows commands and responds appropriately to questions.      Pt placed in lobby. Pt educated on triage process and apologized for wait time. Pt encouraged to alert staff for any changes.     Patient and staff wearing appropriate PPE      BP (!) 160/89   Pulse 62   Temp 36.8 °C (98.3 °F) (Temporal)   Resp 18   Ht 1.778 m (5' 10\")   Wt 99.8 kg (220 lb)   SpO2 95%       "

## 2023-02-25 NOTE — ED NOTES
"Placed on monitor, seizure precautions set up.  PT reports a number of \"absence seizure\" like episodes over last month.  Ast today witnessed by family.  Per family, he was awake, but not alert and unable to talk.  Episode lasted about 20 minutes.    "

## 2023-02-26 LAB
ANION GAP SERPL CALC-SCNC: 11 MMOL/L (ref 7–16)
BUN SERPL-MCNC: 20 MG/DL (ref 8–22)
CALCIUM SERPL-MCNC: 9 MG/DL (ref 8.5–10.5)
CHLORIDE SERPL-SCNC: 108 MMOL/L (ref 96–112)
CO2 SERPL-SCNC: 22 MMOL/L (ref 20–33)
CREAT SERPL-MCNC: 0.71 MG/DL (ref 0.5–1.4)
GFR SERPLBLD CREATININE-BSD FMLA CKD-EPI: 103 ML/MIN/1.73 M 2
GLUCOSE SERPL-MCNC: 98 MG/DL (ref 65–99)
MAGNESIUM SERPL-MCNC: 2.1 MG/DL (ref 1.5–2.5)
POTASSIUM SERPL-SCNC: 3.9 MMOL/L (ref 3.6–5.5)
SODIUM SERPL-SCNC: 141 MMOL/L (ref 135–145)

## 2023-02-26 PROCEDURE — 700102 HCHG RX REV CODE 250 W/ 637 OVERRIDE(OP): Performed by: NURSE PRACTITIONER

## 2023-02-26 PROCEDURE — 80048 BASIC METABOLIC PNL TOTAL CA: CPT

## 2023-02-26 PROCEDURE — 99232 SBSQ HOSP IP/OBS MODERATE 35: CPT | Performed by: NURSE PRACTITIONER

## 2023-02-26 PROCEDURE — 83735 ASSAY OF MAGNESIUM: CPT

## 2023-02-26 PROCEDURE — A9270 NON-COVERED ITEM OR SERVICE: HCPCS | Performed by: NURSE PRACTITIONER

## 2023-02-26 PROCEDURE — G0378 HOSPITAL OBSERVATION PER HR: HCPCS

## 2023-02-26 RX ORDER — DIVALPROEX SODIUM 250 MG/1
250 TABLET, EXTENDED RELEASE ORAL 2 TIMES DAILY
Status: DISCONTINUED | OUTPATIENT
Start: 2023-02-26 | End: 2023-02-27

## 2023-02-26 RX ADMIN — DIVALPROEX SODIUM 250 MG: 250 TABLET, EXTENDED RELEASE ORAL at 10:56

## 2023-02-26 RX ADMIN — DIVALPROEX SODIUM 250 MG: 250 TABLET, EXTENDED RELEASE ORAL at 20:31

## 2023-02-26 ASSESSMENT — ENCOUNTER SYMPTOMS
FALLS: 0
SENSORY CHANGE: 0
FEVER: 0
FOCAL WEAKNESS: 0
COUGH: 0
DIARRHEA: 0
DIZZINESS: 0
LOSS OF CONSCIOUSNESS: 0
CHILLS: 0
MEMORY LOSS: 1
NAUSEA: 0
VOMITING: 0
SPEECH CHANGE: 1
SEIZURES: 1
SHORTNESS OF BREATH: 0

## 2023-02-26 ASSESSMENT — PAIN DESCRIPTION - PAIN TYPE
TYPE: ACUTE PAIN
TYPE: ACUTE PAIN

## 2023-02-26 NOTE — PROGRESS NOTES
Hospital Medicine Daily Progress Note    Date of Service  2/26/2023    Chief Complaint  Flex Perrin is a 62 y.o. male admitted 2/25/2023 with confusion    Hospital Course  This is a 62-year-old male with past medical history of hyperlipidemia, TAA s/p repair, aortic stenosis s/p AVR, AV block s/p PPM, and hx of seizure disorder who presents to the ED on 2/25/2023 with confusion, slurred speech and unsteady gait for the past 3 weeks.    Patient previously diagnosed with seizures in February 2021, was seen by neurology at that point and started on Keppra, however states that he stopped taking medication approximately 1 year ago because it increased his agitation.  Over the last month patient's family have noticed episodes of confusion, gazing and loss of attention.  In addition they are concerned for possible slurred speech and possible unsteady gait.    In the emergency department alcohol level negative, UDS negative, head CT negative.  EKG NSR with no acute ischemia noted.  EEG and brain MRI ordered.    Interval Problem Update  Patient in bed, oriented x4, is somewhat impulsive.  No focal deficits noted however speech is rapid and somewhat confused.  Attempted to contact patient's daughter and son to determine patient's baseline however unable to reach anyone.  -EEG pending  -Will start Depakote for antiseizure, patient reports that he was unable to tolerate Keppra as result of increased agitation  -MRI pending to rule out CVA, patient does have a [employer] pacemaker so this will need to be coordinated with pacemaker rep    I have discussed this patient's plan of care and discharge plan at IDT rounds today with Case Management, Nursing, Nursing leadership, and other members of the IDT team.    Consultants/Specialty  None    Code Status  Full Code    Disposition  Patient is not medically cleared for discharge.   Anticipate discharge to to home with close outpatient follow-up.  I have placed the appropriate orders  for post-discharge needs.    Review of Systems  Review of Systems   Constitutional:  Negative for chills, fever and malaise/fatigue.   Respiratory:  Negative for cough and shortness of breath.    Cardiovascular:  Negative for chest pain and leg swelling.   Gastrointestinal:  Negative for diarrhea, nausea and vomiting.   Genitourinary:  Negative for dysuria.   Musculoskeletal:  Negative for falls.   Neurological:  Positive for speech change and seizures. Negative for dizziness, sensory change, focal weakness and loss of consciousness.   Psychiatric/Behavioral:  Positive for memory loss.       Physical Exam  Temp:  [36.2 °C (97.1 °F)-37.1 °C (98.8 °F)] 36.2 °C (97.1 °F)  Pulse:  [66-74] 66  Resp:  [16-53] 18  BP: (121-168)/(58-98) 128/79  SpO2:  [88 %-94 %] 88 %    Physical Exam  Vitals and nursing note reviewed.   Constitutional:       General: He is not in acute distress.     Appearance: He is well-developed. He is not ill-appearing.   HENT:      Head: Normocephalic and atraumatic.      Mouth/Throat:      Mouth: Mucous membranes are moist.   Cardiovascular:      Rate and Rhythm: Normal rate and regular rhythm.      Pulses: Normal pulses.      Heart sounds: Normal heart sounds. No murmur heard.  Pulmonary:      Effort: Pulmonary effort is normal.      Breath sounds: Normal breath sounds. No decreased air movement. No wheezing.   Abdominal:      General: There is no distension.      Palpations: Abdomen is soft.      Tenderness: There is no abdominal tenderness. There is no guarding.   Musculoskeletal:      Right lower leg: No edema.      Left lower leg: No edema.   Neurological:      Mental Status: He is alert and oriented to person, place, and time. He is confused.      Cranial Nerves: No cranial nerve deficit.      Motor: No weakness.   Psychiatric:         Attention and Perception: Attention normal.         Mood and Affect: Mood normal.         Speech: Speech is delayed.         Behavior: Behavior normal. Behavior  "is cooperative.         Cognition and Memory: Memory is impaired.         Judgment: Judgment is impulsive.       Fluids  No intake or output data in the 24 hours ending 02/26/23 1449    Laboratory  Recent Labs     02/25/23  1304   WBC 6.4   RBC 5.16   HEMOGLOBIN 15.0   HEMATOCRIT 43.5   MCV 84.3   MCH 29.1   MCHC 34.5   RDW 36.3   PLATELETCT 186   MPV 10.2     Recent Labs     02/25/23  1304   SODIUM 139   POTASSIUM 3.9   CHLORIDE 106   CO2 22   GLUCOSE 149*   BUN 19   CREATININE 0.85   CALCIUM 8.6                   Imaging  CT-HEAD W/O   Final Result      1.  Cerebral atrophy.      2.  Otherwise, Head CT without contrast within normal limits. No evidence of acute cerebral infarction, hemorrhage or mass lesion.         DX-CHEST-PORTABLE (1 VIEW)   Final Result         No acute cardiac or pulmonary abnormality is identified.      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, was discharged on Keppra  Patient stopped Keppra approximately a year ago states that he felt more \"agitated and aggressive\" as a result, has not followed up with neurology  Presentation sounds similar to absence seizure's, concerned that presentation related to recurrence of seizures as a result of medical noncompliance  Head CT negative for any acute pathology  MRI pending (will need to be coordinated with pacemaker rep)  Telemetry monitoring  Spot EEG pending  Neurochecks Q4  Will start patient on Depakote for seizure prevention, will likely need follow-up with outpatient neurology/epilepsy clinic    Seizure disorder (HCC)- (present on admission)  Assessment & Plan  Patient seen by neurology 2/2021, 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    Sick sinus " syndrome (HCC)- (present on admission)  Assessment & Plan  S/P PPM      History of heart valve replacement with bioprosthetic valve [Z95.4]  Assessment & Plan  S/p AVR    History of severe aortic stenosis- (present on admission)  Assessment & Plan  S/p AVR    History of thoracic aortic aneurysm without rupture (HCC)- (present on admission)  Assessment & Plan  S/p repair    History of repair of thoracic aortic aneurysm- (present on admission)  Assessment & Plan  hx         VTE prophylaxis: pharmacologic prophylaxis contraindicated due to not indicated    I have performed a physical exam and reviewed and updated ROS and Plan today (2/26/2023). In review of yesterday's note (2/25/2023), there are no changes except as documented above.

## 2023-02-26 NOTE — ASSESSMENT & PLAN NOTE
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
## 2023-02-26 NOTE — CARE PLAN
Problem: Knowledge Deficit - Standard  Goal: Patient and family/care givers will demonstrate understanding of plan of care, disease process/condition, diagnostic tests and medications  Outcome: Progressing     The patient is Stable - Low risk of patient condition declining or worsening         Progress made toward(s) clinical / shift goals:  patient understands the tests and care plan that has been set by his care team.     Patient is not progressing towards the following goals:

## 2023-02-26 NOTE — ASSESSMENT & PLAN NOTE
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

## 2023-02-26 NOTE — HOSPITAL COURSE
This is a 62-year-old male with past medical history of hyperlipidemia, TAA s/p repair, aortic stenosis s/p AVR, AV block s/p PPM, and hx of seizure disorder who presents to the ED on 2/25/2023 with confusion, slurred speech and unsteady gait for the past 3 weeks.    Patient previously diagnosed with seizures in February 2021, was seen by neurology at that point and started on Keppra, however states that he stopped taking medication approximately 1 year ago because it increased his agitation.  Over the last month patient's family have noticed episodes of confusion, gazing and loss of attention.  In addition they are concerned for possible slurred speech and possible unsteady gait.    In the emergency department alcohol level negative, UDS negative, head CT negative.  EKG NSR with no acute ischemia noted.  EEG and brain MRI ordered.

## 2023-02-26 NOTE — ED NOTES
Med rec updated and  complete. Allergies reviewed. Confirmed name and date of birth. Pt denies taking medications.      Home pharmacy [facility] [phone]

## 2023-02-26 NOTE — PROGRESS NOTES
4 Eyes Skin Assessment Completed by GLADIS Vanessa and GLADIS Dunn.    Head WDL  Ears WDL  Nose WDL  Mouth WDL  Neck WDL  Breast/Chest Scar  Shoulder Blades WDL  Spine WDL  (R) Arm/Elbow/Hand WDL  (L) Arm/Elbow/Hand WDL  Abdomen WDL  Groin WDL  Scrotum/Coccyx/Buttocks WDL  (R) Leg WDL  (L) Leg WDL  (R) Heel/Foot/Toe WDL  (L) Heel/Foot/Toe WDL          Devices In Places Tele Box, Blood Pressure Cuff, and Pulse Ox      Interventions In Place Pillows    Possible Skin Injury No    Pictures Uploaded Into Epic N/A  Wound Consult Placed N/A  RN Wound Prevention Protocol Ordered No
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
## 2023-02-26 NOTE — PROGRESS NOTES
Pt seems more disoriented this afternoon. Oriented to situation but forgetful about time. Updated Heena GILLIS.

## 2023-02-27 PROBLEM — G40.309 EPILEPSY SEIZURE, GENERALIZED, CONVULSIVE (HCC): Status: ACTIVE | Noted: 2023-02-27

## 2023-02-27 PROCEDURE — 770001 HCHG ROOM/CARE - MED/SURG/GYN PRIV*

## 2023-02-27 PROCEDURE — 95816 EEG AWAKE AND DROWSY: CPT | Mod: 26 | Performed by: PSYCHIATRY & NEUROLOGY

## 2023-02-27 PROCEDURE — 700102 HCHG RX REV CODE 250 W/ 637 OVERRIDE(OP): Performed by: NURSE PRACTITIONER

## 2023-02-27 PROCEDURE — A9270 NON-COVERED ITEM OR SERVICE: HCPCS | Performed by: NURSE PRACTITIONER

## 2023-02-27 PROCEDURE — 4A10X4Z MONITORING OF CENTRAL NERVOUS ELECTRICAL ACTIVITY, EXTERNAL APPROACH: ICD-10-PCS | Performed by: PSYCHIATRY & NEUROLOGY

## 2023-02-27 PROCEDURE — 99232 SBSQ HOSP IP/OBS MODERATE 35: CPT | Performed by: NURSE PRACTITIONER

## 2023-02-27 PROCEDURE — 95816 EEG AWAKE AND DROWSY: CPT | Performed by: PSYCHIATRY & NEUROLOGY

## 2023-02-27 RX ORDER — DIVALPROEX SODIUM 500 MG/1
1500 TABLET, DELAYED RELEASE ORAL ONCE
Status: COMPLETED | OUTPATIENT
Start: 2023-02-27 | End: 2023-02-27

## 2023-02-27 RX ORDER — DIVALPROEX SODIUM 500 MG/1
500 TABLET, EXTENDED RELEASE ORAL 2 TIMES DAILY
Status: DISCONTINUED | OUTPATIENT
Start: 2023-02-28 | End: 2023-02-28 | Stop reason: HOSPADM

## 2023-02-27 RX ADMIN — DIVALPROEX SODIUM 250 MG: 250 TABLET, EXTENDED RELEASE ORAL at 05:27

## 2023-02-27 RX ADMIN — DIVALPROEX SODIUM 1500 MG: 500 TABLET, DELAYED RELEASE ORAL at 14:48

## 2023-02-27 ASSESSMENT — ENCOUNTER SYMPTOMS
DIARRHEA: 0
NAUSEA: 0
VOMITING: 0
MEMORY LOSS: 1
SPEECH CHANGE: 1
FEVER: 0
FOCAL WEAKNESS: 0
COUGH: 0
CHILLS: 0
DIZZINESS: 0
LOSS OF CONSCIOUSNESS: 0
FALLS: 0
SHORTNESS OF BREATH: 0
SENSORY CHANGE: 0
SEIZURES: 1

## 2023-02-27 ASSESSMENT — COPD QUESTIONNAIRES
HAVE YOU SMOKED AT LEAST 100 CIGARETTES IN YOUR ENTIRE LIFE: NO/DON'T KNOW
COPD SCREENING SCORE: 2
DO YOU EVER COUGH UP ANY MUCUS OR PHLEGM?: NO/ONLY WITH OCCASIONAL COLDS OR INFECTIONS
DURING THE PAST 4 WEEKS HOW MUCH DID YOU FEEL SHORT OF BREATH: NONE/LITTLE OF THE TIME

## 2023-02-27 ASSESSMENT — PAIN DESCRIPTION - PAIN TYPE: TYPE: ACUTE PAIN

## 2023-02-27 NOTE — PROCEDURES
[facility]    Inpatient Standard Video Electroencephalogram Report      Patient Name: Flex Perrin  MRN: [id number]  #: [id number]  Date of Service: 02/27/23  Total Recording Time: 0 hours and 25 minutes.  Referring Provider: KIANA Winters    INDICATION:  Flex Perrin 62 y.o. male, with the history of seizures, presenting with  confusion . This standard, inpatient, EEG was requested to evaluate for seizure(s).    CURRENT ANTI-SEIZURE MEDICATIONS:     Current Facility-Administered Medications:     divalproex ER    acetaminophen    LORazepam    TECHNIQUE: Standard inpatient video EEG was set up by a Neurodiagnostic technologist who performed education to the patient and staff. A minimum of 23 electrodes and 23 channel recording was setup and performed by Neurodiagnostic technologist, in accordance with the international 10-20 system. Impedence, electrode integrity, and technical impressions were documented. The study was reviewed in bipolar and referential montages. The recording examined the patient in the awake and drowsy state(s).     DESCRIPTION OF THE RECORD:  EEG background: During maximal wakefulness, the background was continuous, intermittently asymmetrical, and showed a 10 Hz posterior dominant rhythm, with a mixture of alpha and beta activity.  Reactivity  was seen. State changes were seen.  During drowsiness, a loss of myogenic artifact  and theta/delta frequencies were seen.     Definite N2 sleep architecture was not seen.    ACTIVATION PROCEDURES:   Intermittent Photic stimulation was performed in a stepwise fashion from 1 to 30 Hz and did not elicited any abnormalities on EEG.     ICTAL AND INTERICTAL FINDINGS:   There were frequent, somewhat formed, left mid-temporal sharp discharges, rarely occurring in brief periodic pattern at 1/second.    There was very frequent, intermittent, left temporal focal slowing; at times, there was left TIRDA.     No definite electrographic seizures were  reported or recorded during the study.     EKG: Sampling of the EKG recording did not demonstrate any abnormalities    EVENTS:  There were intermittent episodes of lip smacking/chewing movements with no perceived food involved, at times, but not consistently associated with the above noted brief runs of left temporal periodic discharges/TIRDA.     INTERPRETATION: This was an abnormal EEG due to:  Frequent, intermittent, left temporal focal slowing, suggestive of cerebral dysfunction in that region. This finding might be seen in context of structural lesion and/or ictal onset zone, among other considerations. Clinical and radiological correlation is recommended.  The presence of frequent, somewhat formed, left mid-temporal sharp discharges, rarely occurring in brief periodic pattern at 1/second, is suggestive of increased propensity toward focal seizures arising in that region. This is further supported with left TIRDA (temporal intermittent rhythmic delta activity).   The above noted clinical events of lip smacking/chewing movements with no perceived food involved, at times, but not consistently associated with the above noted brief left temporal periodic discharges/TIRDA, still may raise concern for focal clinical seizures that are not well captured on scalp EEG due to its likely deep and focal location in the brain. Clinical correlation is recommended.     Minh Melendrez MD  Neurology Attending, Epilepsy Program  [facility]

## 2023-02-27 NOTE — PROGRESS NOTES
Hospital Medicine Daily Progress Note    Date of Service  2/27/2023    Chief Complaint  Flex Perrin is a 62 y.o. male admitted 2/25/2023 with confusion    Hospital Course  This is a 62-year-old male with past medical history of hyperlipidemia, TAA s/p repair, aortic stenosis s/p AVR, AV block s/p PPM, and hx of seizure disorder who presents to the ED on 2/25/2023 with confusion, slurred speech and unsteady gait for the past 3 weeks.    Patient previously diagnosed with seizures in February 2021, was seen by neurology at that point and started on Keppra, however states that he stopped taking medication approximately 1 year ago because it increased his agitation.  Over the last month patient's family have noticed episodes of confusion, gazing and loss of attention.  In addition they are concerned for possible slurred speech and possible unsteady gait.    In the emergency department alcohol level negative, UDS negative, head CT negative.  EKG NSR with no acute ischemia noted.  EEG and brain MRI ordered.    Interval Problem Update  Patient wake oriented, with intermittent episodes of slurred speech.  Patient did have self-limiting episode of SVT/VT lasted for 28 beats patient was asymptomatic.  No additional issues overnight.  Discussed findings of EEG and plan with patient and his daughter on the phone.  -EEG did show concern for focal seizures, similar to patient's EEG completed in 2021  -Discussed briefly with neurology given patient's previous issue with Keppra will start him on Depakote, loading dose 1500 mg today and then Depakote 500 mg twice a day  -Discontinue MRI order as symptoms are related to focal seizure  -Will keep patient overnight to ensure improvement in symptoms    I have discussed this patient's plan of care and discharge plan at IDT rounds today with Case Management, Nursing, Nursing leadership, and other members of the IDT team.    Consultants/Specialty  None    Code Status  Full  Code    Disposition  Patient is not medically cleared for discharge.   Anticipate discharge to to home with close outpatient follow-up.  I have placed the appropriate orders for post-discharge needs.    Review of Systems  Review of Systems   Constitutional:  Negative for chills, fever and malaise/fatigue.   Respiratory:  Negative for cough and shortness of breath.    Cardiovascular:  Negative for chest pain and leg swelling.   Gastrointestinal:  Negative for diarrhea, nausea and vomiting.   Genitourinary:  Negative for dysuria.   Musculoskeletal:  Negative for falls.   Neurological:  Positive for speech change and seizures. Negative for dizziness, sensory change, focal weakness and loss of consciousness.   Psychiatric/Behavioral:  Positive for memory loss.       Physical Exam  Temp:  [36.4 °C (97.6 °F)-37 °C (98.6 °F)] 36.9 °C (98.4 °F)  Pulse:  [62-67] 67  Resp:  [16-20] 16  BP: (126-145)/(67-87) 126/67  SpO2:  [91 %-97 %] 97 %    Physical Exam  Vitals and nursing note reviewed.   Constitutional:       General: He is not in acute distress.     Appearance: He is well-developed. He is not ill-appearing.   HENT:      Head: Normocephalic and atraumatic.      Mouth/Throat:      Mouth: Mucous membranes are moist.   Cardiovascular:      Rate and Rhythm: Normal rate and regular rhythm.      Pulses: Normal pulses.      Heart sounds: Normal heart sounds. No murmur heard.  Pulmonary:      Effort: Pulmonary effort is normal.      Breath sounds: Normal breath sounds. No decreased air movement. No wheezing.   Abdominal:      General: There is no distension.      Palpations: Abdomen is soft.      Tenderness: There is no abdominal tenderness. There is no guarding.   Musculoskeletal:      Right lower leg: No edema.      Left lower leg: No edema.   Neurological:      Mental Status: He is alert and oriented to person, place, and time. He is confused.      Cranial Nerves: No cranial nerve deficit.      Motor: No weakness.    Psychiatric:         Attention and Perception: Attention normal.         Mood and Affect: Mood normal.         Speech: Speech is delayed.         Behavior: Behavior normal. Behavior is cooperative.         Cognition and Memory: Memory is impaired.         Judgment: Judgment is impulsive.       Fluids  No intake or output data in the 24 hours ending 02/27/23 1330    Laboratory  Recent Labs     02/25/23  1304   WBC 6.4   RBC 5.16   HEMOGLOBIN 15.0   HEMATOCRIT 43.5   MCV 84.3   MCH 29.1   MCHC 34.5   RDW 36.3   PLATELETCT 186   MPV 10.2       Recent Labs     02/25/23  1304 02/26/23  2223   SODIUM 139 141   POTASSIUM 3.9 3.9   CHLORIDE 106 108   CO2 22 22   GLUCOSE 149* 98   BUN 19 20   CREATININE 0.85 0.71   CALCIUM 8.6 9.0                     Imaging  CT-HEAD W/O   Final Result      1.  Cerebral atrophy.      2.  Otherwise, Head CT without contrast within normal limits. No evidence of acute cerebral infarction, hemorrhage or mass lesion.         DX-CHEST-PORTABLE (1 VIEW)   Final Result         No acute cardiac or pulmonary abnormality is identified.             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, instructed to continue Keppra 500 mg twice daily    Spot EEG positive for temporal intermittent rhythmic delta activity, suggestive for focal seizures  Per epileptologist seizure pattern is similar to previous presentation in 2021  Neurochecks Q4  Discussed briefly with neurology, given patient's issue with Keppra they recommend starting Depakote, patient will be placed on loading dose of 1500 mg and started on 500 mg of Depakote twice a day  Will keep patient overnight to ensure improvement in his symptoms and frequency of seizures      Confusion and disorientation  Assessment & Plan  Patient seen by neurology 2/2021, 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    Sick sinus syndrome (HCC)- (present on admission)  Assessment & Plan  S/P PPM      History of heart valve replacement with bioprosthetic valve [Z95.4]  Assessment & Plan  S/p AVR    History of severe aortic stenosis- (present on admission)  Assessment & Plan  S/p AVR    History of thoracic aortic aneurysm without rupture (HCC)- (present on admission)  Assessment & Plan  S/p repair    History of repair of thoracic aortic aneurysm- (present on admission)  Assessment & Plan  hx         VTE prophylaxis: pharmacologic prophylaxis contraindicated due to not indicated    I have performed a physical exam and reviewed and updated ROS and Plan today (2/27/2023). In review of yesterday's note (2/26/2023), there are no changes except as documented above.        "

## 2023-02-27 NOTE — PROGRESS NOTES
Pt Aox4. Reports feeling much better and more with it than the morning. Took a walk around the unit with staff. Pt aware of plan for EEG in am and awaiting MRI as well.

## 2023-02-27 NOTE — RESPIRATORY CARE
COPD EDUCATION by COPD CLINICAL EDUCATOR  2/27/2023  at  1:10 PM by Mary Noguera, ELISABET     Patient interviewed by COPD education team.  Patient unable to participate in full program.  A short intervention has been conducted.  A comprehensive packet including information about COPD. Patient does not use any respiratory medications or oxygen at home.    COPD Screen  COPD Risk Screening  Do you have a history of COPD?: Yes  Do you have a Pulmonologist?: No  COPD Population Screener  During the past 4 weeks, how much did you feel short of breath?: None/Little of the time  Do you ever cough up any mucus or phlegm?: No/only with occasional colds or infections  In the past 12 months, you do less than you used to because of your breathing problems: Disagree/unsure  Have you smoked at least 100 cigarettes in your entire life?: No/don't know  How old are you?: 60+  COPD Screening Score: 2  COPD Coordinator Not Recommended: Yes    COPD Assessment  COPD Clinical Specialists ONLY  COPD Education Initiated: Yes--Short Intervention (COPD history, no respiratory medications or oxygen. no PFT.)  DME Company: none  DME Equipment Type: none  Physician Name: none on file  Pulmonologist Name: none on file  Referrals Initiated: Yes  Pulmonary Rehab: N/A  Smoking Cessation: N/A  Hospice: N/A  Home Health Care: N/A  [facility]: N/A  [facility]: N/A  [facility]: N/A  Private In-Home Care Agency: N/A  Is this a COPD exacerbation patient?: No  $ Demo/Eval of SVN's, MDI's and Aerosols:  (no respiratory meds)  (OP) Pulmonary Function Testing:  (not on file)    PFT Results    No results found for: PFT    Meds to Beds  Would the patient like to opt in for Bedside Medication Delivery at Discharge?: No     MY COPD ACTION PLAN     It is recommended that patients and physicians /healthcare providers complete this action plan together. This plan should be discussed at each physician visit and updated as  "needed.    The green, yellow and red zones show groups of symptoms of COPD. This list of symptoms is not comprehensive, and you may experience other symptoms. In the \"Actions\" column, your healthcare provider has recommended actions for you to take based on your symptoms.    Patient Name: Flex Perrin   YOB: 1960   Last Updated on: 2/27/2023  1:09 PM   Green Zone:  I am doing well today Actions     Usual activitiy and exercise level   Take daily medications     Usual amounts of cough and phlegm/mucus   Use oxygen as prescribed     Sleep well at night   Continue regular exercise/diet plan     Appetite is good   At all times avoid cigarette smoke, inhaled irritants     Daily Medications (these medications are taken every day):                Yellow Zone:  I am having a bad day or a COPD flare Actions     More breathless than usual   Continue daily medications     I have less energy for my daily activities   Use quick relief inhaler as ordered     Increased or thicker phlegm/mucus   Use oxygen as prescribed     Using quick relief inhaler/nebulizer more often   Get plenty of rest     Swelling of ankles more than usual   Use pursed lip breathing     More coughing than usual   At all times avoid cigarette smoke, inhaled irritants     I feel like I have a \"chest cold\"     Poor sleep and my symptoms woke me up     My appetite is not good     My medicine is not helping      Call provider immediately if symptoms don’t improve     Continue daily medications, add rescue medications:               Medications to be used during a flare up, (as Discussed with Provider):              Red Zone:  I need urgent medical care Actions     Severe shortness of breath even at rest   Call 911 or seek medical care immediately     Not able to do any activity because of breathing      Fever or shaking chills      Feeling confused or very drowsy       Chest pains      Coughing up blood                  "

## 2023-02-27 NOTE — PROGRESS NOTES
Pt in bed awake,a&ox4,no neuro deficits noticed,pt pleasant and cooperative,follow instructions,awaiting for EEG and MRI.

## 2023-02-27 NOTE — PROGRESS NOTES
Tele monitor report  Rhythm: Sinus nicole/Sinus regular w/ rare PVCs  HR: 58 - 60  0.18 / 0.08 / 0.43

## 2023-02-27 NOTE — PROGRESS NOTES
Pt had run of V tach. Asymptomatic. Vitals stable. ELIS Mike on floor during event and notified. No orders received.

## 2023-02-27 NOTE — PROGRESS NOTES
Monitor summary: SR 63-72, AK 0.19, QRS 0.09, QT 0.43, with PVCs per strip from monitor room.

## 2023-02-27 NOTE — CARE PLAN
The patient is Watcher - Medium risk of patient condition declining or worsening    Shift Goals  Clinical Goals: Q4 neuro checks,  Patient Goals: rest    Progress made toward(s) clinical / shift goals:    Problem: Knowledge Deficit - Standard  Goal: Patient and family/care givers will demonstrate understanding of plan of care, disease process/condition, diagnostic tests and medications  Outcome: Progressing  Expected end date: 2/28/2023

## 2023-02-28 ENCOUNTER — PHARMACY VISIT (OUTPATIENT)
Dept: PHARMACY | Facility: MEDICAL CENTER | Age: 63
End: 2023-02-28
Payer: COMMERCIAL

## 2023-02-28 VITALS
RESPIRATION RATE: 18 BRPM | HEIGHT: 70 IN | OXYGEN SATURATION: 90 % | SYSTOLIC BLOOD PRESSURE: 130 MMHG | DIASTOLIC BLOOD PRESSURE: 84 MMHG | TEMPERATURE: 98.2 F | WEIGHT: 220.9 LBS | BODY MASS INDEX: 31.62 KG/M2 | HEART RATE: 75 BPM

## 2023-02-28 PROBLEM — I47.29 NSVT (NONSUSTAINED VENTRICULAR TACHYCARDIA) (HCC): Status: ACTIVE | Noted: 2023-02-28

## 2023-02-28 PROCEDURE — 700102 HCHG RX REV CODE 250 W/ 637 OVERRIDE(OP): Performed by: NURSE PRACTITIONER

## 2023-02-28 PROCEDURE — RXMED WILLOW AMBULATORY MEDICATION CHARGE: Performed by: STUDENT IN AN ORGANIZED HEALTH CARE EDUCATION/TRAINING PROGRAM

## 2023-02-28 PROCEDURE — A9270 NON-COVERED ITEM OR SERVICE: HCPCS | Performed by: NURSE PRACTITIONER

## 2023-02-28 PROCEDURE — 99254 IP/OBS CNSLTJ NEW/EST MOD 60: CPT | Performed by: INTERNAL MEDICINE

## 2023-02-28 PROCEDURE — 99239 HOSP IP/OBS DSCHRG MGMT >30: CPT | Performed by: STUDENT IN AN ORGANIZED HEALTH CARE EDUCATION/TRAINING PROGRAM

## 2023-02-28 RX ORDER — DIVALPROEX SODIUM 500 MG/1
500 TABLET, EXTENDED RELEASE ORAL 2 TIMES DAILY
Qty: 30 TABLET | Refills: 3 | Status: SHIPPED | OUTPATIENT
Start: 2023-02-28 | End: 2023-07-19

## 2023-02-28 RX ADMIN — DIVALPROEX SODIUM 500 MG: 500 TABLET, EXTENDED RELEASE ORAL at 05:28

## 2023-02-28 ASSESSMENT — ENCOUNTER SYMPTOMS
NAUSEA: 0
FOCAL WEAKNESS: 0
DIZZINESS: 0
FALLS: 0
LOSS OF CONSCIOUSNESS: 0
COUGH: 0
SHORTNESS OF BREATH: 0
DIARRHEA: 0
SPEECH CHANGE: 1
FEVER: 0
SENSORY CHANGE: 0
SEIZURES: 1
VOMITING: 0
CHILLS: 0
MEMORY LOSS: 1

## 2023-02-28 ASSESSMENT — PAIN DESCRIPTION - PAIN TYPE: TYPE: ACUTE PAIN

## 2023-02-28 NOTE — PROGRESS NOTES
Received patient on our unit in room 199. Patient is aox4. Patient has no pain at this time.Patient has cardiac monitor in place.     Possible d/c tomorrow.     Bed in lowest locked position. Call bell at bedside.     4 eyes completed.

## 2023-02-28 NOTE — DISCHARGE PLANNING
Case Management Discharge Planning    Patient with pending [place] FERMIN.  Approved services form completed for following medication; Form faxed to [facility] pharmacy & DPA:       Depakote   $8.76

## 2023-02-28 NOTE — DISCHARGE SUMMARY
Discharge Summary    CHIEF COMPLAINT ON ADMISSION  Chief Complaint   Patient presents with    ALOC     On going for 3 weeks - worse today     Slurred Speech    Unsteady Gait       Reason for Admission  other     Admission Date  2/25/2023    CODE STATUS  Full Code    HPI & HOSPITAL COURSE     This is a 62-year-old male with past medical history of hyperlipidemia, TAA s/p repair, aortic stenosis s/p AVR, AV block s/p PPM, and hx of seizure disorder who presents to the ED on 2/25/2023 with confusion, slurred speech and unsteady gait for the past 3 weeks.    Patient previously diagnosed with seizures in February 2021, was seen by neurology at that point and started on Keppra, however states that he stopped taking medication approximately 1 year ago because it increased his agitation.  Over the last month patient's family have noticed episodes of confusion, gazing and loss of attention.  In addition they are concerned for possible slurred speech and possible unsteady gait.    In the emergency department alcohol level negative, UDS negative, head CT negative.  EKG NSR with no acute ischemia noted.  EEG and brain MRI ordered.    Patient has a history of seizures.  Its not completely clear why he was discontinued in the past.  EEG obtained inpatient was abnormal and showed seizure activity.  An MRI would not be beneficial since he does have a history of seizures.  MRI was canceled.  He has had no further seizures since he was started on Depakote.  Patient had an episode of tachycardia, cardiology was consulted it appears this was likely atrial fibrillation.  They are recommending a Zio patch and a cardiology outpatient referral.    Therefore, he is discharged in good and stable condition to home with close outpatient follow-up.    The patient met 2-midnight criteria for an inpatient stay at the time of discharge.    Discharge Date  2/28/23    FOLLOW UP ITEMS POST DISCHARGE  Seizure disorder-take medications as  prescribed.    DISCHARGE DIAGNOSES  Principal Problem:    Seizure disorder (HCC) POA: Yes  Active Problems:    History of thoracic aortic aneurysm without rupture (HCC) POA: Yes    History of severe aortic stenosis POA: Yes    HLD (hyperlipidemia) POA: Yes    History of repair of thoracic aortic aneurysm POA: Yes    Sick sinus syndrome (HCC) POA: Yes    Class 1 obesity in adult POA: Yes    Confusion and disorientation POA: Unknown    NSVT (nonsustained ventricular tachycardia) POA: Unknown  Resolved Problems:    * No resolved hospital problems. *      FOLLOW UP  No future appointments.  No follow-up provider specified.    MEDICATIONS ON DISCHARGE     Medication List        START taking these medications        Instructions   divalproex  MG Tb24  Commonly known as: DEPAKOTE ER   Take 1 Tablet by mouth 2 times a day.  Dose: 500 mg              Allergies  No Known Allergies    DIET  Orders Placed This Encounter   Procedures    Diet Order Diet: Cardiac     Standing Status:   Standing     Number of Occurrences:   1     Order Specific Question:   Diet:     Answer:   Cardiac [6]       ACTIVITY  As tolerated.  Weight bearing as tolerated    CONSULTATIONS  Cardiology-Dr Linder    PROCEDURES  2/27: EEG    LABORATORY  Lab Results   Component Value Date    SODIUM 141 02/26/2023    POTASSIUM 3.9 02/26/2023    CHLORIDE 108 02/26/2023    CO2 22 02/26/2023    GLUCOSE 98 02/26/2023    BUN 20 02/26/2023    CREATININE 0.71 02/26/2023    GLOMRATE 97 06/11/2022        Lab Results   Component Value Date    WBC 6.4 02/25/2023    HEMOGLOBIN 15.0 02/25/2023    HEMATOCRIT 43.5 02/25/2023    PLATELETCT 186 02/25/2023        Total time of the discharge process exceeds 35 minutes.
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
## 2023-02-28 NOTE — CARE PLAN
The patient is Stable - Low risk of patient condition declining or worsening    Shift Goals  Clinical Goals: Monitor seizure status  Patient Goals: EEG    Progress made toward(s) clinical / shift goals:    Problem: Knowledge Deficit - Standard  Goal: Patient and family/care givers will demonstrate understanding of plan of care, disease process/condition, diagnostic tests and medications  Outcome: Progressing

## 2023-02-28 NOTE — PROGRESS NOTES
Monitor summary: SB-SR 59-65, MI .20, QRS .06, QT .36, with rare PVCs per strip from monitor room.

## 2023-02-28 NOTE — PROGRESS NOTES
4 Eyes Skin Assessment Completed by GLADIS Martinez and GLADIS Ariza.    Head WDL  Ears WDL  Nose WDL  Mouth WDL  Neck WDL  Breast/Chest Scar  Shoulder Blades WDL  Spine WDL  (R) Arm/Elbow/Hand WDL  (L) Arm/Elbow/Hand WDL  Abdomen WDL  Groin WDL  Scrotum/Coccyx/Buttocks Redness and Excoriation  (R) Leg WDL  (L) Leg WDL  (R) Heel/Foot/Toe Redness and Blanching  (L) Heel/Foot/Toe Redness and Blanching          Devices In Places Tele Box and Pulse Ox      Interventions In Place Pillows and Low Air Loss Mattress    Possible Skin Injury Yes    Pictures Uploaded Into Epic Yes  Wound Consult Placed Yes  RN Wound Prevention Protocol Ordered No

## 2023-02-28 NOTE — PROGRESS NOTES
Hospital Medicine Daily Progress Note    Date of Service  2/28/2023    Chief Complaint  Flex Perrin is a 62 y.o. male admitted 2/25/2023 with confusion    Hospital Course  This is a 62-year-old male with past medical history of hyperlipidemia, TAA s/p repair, aortic stenosis s/p AVR, AV block s/p PPM, and hx of seizure disorder who presents to the ED on 2/25/2023 with confusion, slurred speech and unsteady gait for the past 3 weeks.    Patient previously diagnosed with seizures in February 2021, was seen by neurology at that point and started on Keppra, however states that he stopped taking medication approximately 1 year ago because it increased his agitation.  Over the last month patient's family have noticed episodes of confusion, gazing and loss of attention.  In addition they are concerned for possible slurred speech and possible unsteady gait.    In the emergency department alcohol level negative, UDS negative, head CT negative.  EKG NSR with no acute ischemia noted.  EEG and brain MRI ordered.    Interval Problem Update  Patient wake oriented, with intermittent episodes of slurred speech.  Patient did have self-limiting episode of SVT/VT lasted for 28 beats patient was asymptomatic.  No additional issues overnight.  Discussed findings of EEG and plan with patient and his daughter on the phone.  -EEG did show concern for focal seizures, similar to patient's EEG completed in 2021  -Discussed briefly with neurology given patient's previous issue with Keppra will start him on Depakote, loading dose 1500 mg today and then Depakote 500 mg twice a day  -Discontinue MRI order as symptoms are related to focal seizure  -Will keep patient overnight to ensure improvement in symptoms  2/28: Cardiology consulted for 28 beats of NSVT.  MRI canceled.  Vitals are stable.    I have discussed this patient's plan of care and discharge plan at IDT rounds today with Case Management, Nursing, Nursing leadership, and other  members of the IDT team.    Consultants/Specialty  None    Code Status  Full Code    Disposition  Patient is not medically cleared for discharge.   Anticipate discharge to to home with close outpatient follow-up.  I have placed the appropriate orders for post-discharge needs.    Review of Systems  Review of Systems   Constitutional:  Negative for chills, fever and malaise/fatigue.   Respiratory:  Negative for cough and shortness of breath.    Cardiovascular:  Negative for chest pain and leg swelling.   Gastrointestinal:  Negative for diarrhea, nausea and vomiting.   Genitourinary:  Negative for dysuria.   Musculoskeletal:  Negative for falls.   Neurological:  Positive for speech change and seizures. Negative for dizziness, sensory change, focal weakness and loss of consciousness.   Psychiatric/Behavioral:  Positive for memory loss.       Physical Exam  Temp:  [36.6 °C (97.9 °F)-37.1 °C (98.8 °F)] 36.7 °C (98.1 °F)  Pulse:  [62-72] 72  Resp:  [16-18] 18  BP: (122-134)/(76-86) 128/86  SpO2:  [93 %-94 %] 93 %    Physical Exam  Vitals and nursing note reviewed.   Constitutional:       General: He is not in acute distress.     Appearance: He is well-developed. He is not ill-appearing.   HENT:      Head: Normocephalic and atraumatic.      Mouth/Throat:      Mouth: Mucous membranes are moist.   Cardiovascular:      Rate and Rhythm: Normal rate and regular rhythm.      Pulses: Normal pulses.      Heart sounds: Normal heart sounds. No murmur heard.  Pulmonary:      Effort: Pulmonary effort is normal.      Breath sounds: Normal breath sounds. No decreased air movement. No wheezing.   Abdominal:      General: There is no distension.      Palpations: Abdomen is soft.      Tenderness: There is no abdominal tenderness. There is no guarding.   Musculoskeletal:      Right lower leg: No edema.      Left lower leg: No edema.   Neurological:      Mental Status: He is alert and oriented to person, place, and time. He is confused.       Cranial Nerves: No cranial nerve deficit.      Motor: No weakness.   Psychiatric:         Attention and Perception: Attention normal.         Mood and Affect: Mood normal.         Speech: Speech is delayed.         Behavior: Behavior normal. Behavior is cooperative.         Cognition and Memory: Memory is impaired.         Judgment: Judgment is impulsive.       Fluids  No intake or output data in the 24 hours ending 02/28/23 1224    Laboratory  Recent Labs     02/25/23  1304   WBC 6.4   RBC 5.16   HEMOGLOBIN 15.0   HEMATOCRIT 43.5   MCV 84.3   MCH 29.1   MCHC 34.5   RDW 36.3   PLATELETCT 186   MPV 10.2       Recent Labs     02/25/23  1304 02/26/23  2223   SODIUM 139 141   POTASSIUM 3.9 3.9   CHLORIDE 106 108   CO2 22 22   GLUCOSE 149* 98   BUN 19 20   CREATININE 0.85 0.71   CALCIUM 8.6 9.0                     Imaging  CT-HEAD W/O   Final Result      1.  Cerebral atrophy.      2.  Otherwise, Head CT without contrast within normal limits. No evidence of acute cerebral infarction, hemorrhage or mass lesion.         DX-CHEST-PORTABLE (1 VIEW)   Final Result         No acute cardiac or pulmonary abnormality is identified.             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, instructed to continue Keppra 500 mg twice daily    Spot EEG positive for temporal intermittent rhythmic delta activity, suggestive for focal seizures  Per epileptologist seizure pattern is similar to previous presentation in 2021  Neurochecks Q4  Discussed briefly with neurology, given patient's issue with Keppra they recommend starting Depakote, patient will be placed on loading dose of 1500 mg and started on 500 mg of Depakote twice a day  Will keep patient overnight to ensure improvement in his symptoms and frequency of seizures      NSVT (nonsustained ventricular tachycardia)  Assessment & Plan  Patient reportedly had 28 beats of  "NSVT on 2/26  Audiology consulted  Continue telemetry    Confusion and disorientation  Assessment & Plan  Patient seen by neurology 2/2021, 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    Sick sinus syndrome (HCC)- (present on admission)  Assessment & Plan  S/P PPM      History of heart valve replacement with bioprosthetic valve [Z95.4]  Assessment & Plan  S/p AVR    History of severe aortic stenosis- (present on admission)  Assessment & Plan  S/p AVR    History of thoracic aortic aneurysm without rupture (HCC)- (present on admission)  Assessment & Plan  S/p repair    History of repair of thoracic aortic aneurysm- (present on admission)  Assessment & Plan  hx         VTE prophylaxis: pharmacologic prophylaxis contraindicated due to not indicated    I have performed a physical exam and reviewed and updated ROS and Plan today (2/28/2023). In review of yesterday's note (2/27/2023), there are no changes except as documented above.        "

## 2023-03-01 ENCOUNTER — TELEPHONE (OUTPATIENT)
Dept: CARDIOLOGY | Facility: MEDICAL CENTER | Age: 63
End: 2023-03-01

## 2023-03-01 NOTE — TELEPHONE ENCOUNTER
ADD    Caller: Brooklynn Troy    Topic/issue: FYI: Brooklynn has tried to reach out to the patient to discuss the cost and benefits of the zio that was ordered in the hospital and has not been able to reach HIM. Pt is scheduled for a HV 04/20/2023 with Dr. Cuellar    Callback Number: [phone] opt 4     Thank You    Sharifa HERNDON

## 2023-03-01 NOTE — PROGRESS NOTES
All discharge instructions given to patient, all questions and concerns addressed. Prescriptions delivered to room. Zio patch placed, instructions given to patient and follow up with cardiology made. Will wheelchair patient to discharge.

## 2023-03-14 ENCOUNTER — TELEPHONE (OUTPATIENT)
Dept: HEALTH INFORMATION MANAGEMENT | Facility: OTHER | Age: 63
End: 2023-03-14

## 2023-03-15 ENCOUNTER — TELEPHONE (OUTPATIENT)
Dept: CARDIOLOGY | Facility: MEDICAL CENTER | Age: 63
End: 2023-03-15
Payer: MEDICAID

## 2023-03-15 NOTE — TELEPHONE ENCOUNTER
BRETT (ADD)    Caller: Eva     Name and Department:     [facility]  P:  [phone]  F: [phone]    Ticket #: [id number]  Serial #: [id number]    Topic/Issue: END OF WEAR    Per Eva Jamilah sent patient a new monitor due to max transmission of the first device and patient has refused to place the monitor. His end of wear should have been 3/17, however patient has removed his first patch and will not place his new patch on. He is returning both patches today 3/15. Please be advised.    Thank you,  Anmol MONTEZ    Callback Number or Extension: [phone]

## 2023-04-03 ENCOUNTER — TELEPHONE (OUTPATIENT)
Dept: CARDIOLOGY | Facility: MEDICAL CENTER | Age: 63
End: 2023-04-03

## 2023-04-04 ENCOUNTER — OFFICE VISIT (OUTPATIENT)
Dept: NEUROLOGY | Facility: MEDICAL CENTER | Age: 63
End: 2023-04-04
Attending: PSYCHIATRY & NEUROLOGY
Payer: MEDICAID

## 2023-04-04 VITALS
HEART RATE: 73 BPM | TEMPERATURE: 98.1 F | OXYGEN SATURATION: 94 % | DIASTOLIC BLOOD PRESSURE: 86 MMHG | SYSTOLIC BLOOD PRESSURE: 130 MMHG

## 2023-04-04 DIAGNOSIS — G40.109 LOCALIZATION-RELATED EPILEPSY (HCC): ICD-10-CM

## 2023-04-04 DIAGNOSIS — G31.84 MILD COGNITIVE IMPAIRMENT WITH MEMORY LOSS: ICD-10-CM

## 2023-04-04 PROCEDURE — 99211 OFF/OP EST MAY X REQ PHY/QHP: CPT | Performed by: PSYCHIATRY & NEUROLOGY

## 2023-04-04 PROCEDURE — 99215 OFFICE O/P EST HI 40 MIN: CPT | Performed by: PSYCHIATRY & NEUROLOGY

## 2023-04-04 PROCEDURE — 99417 PROLNG OP E/M EACH 15 MIN: CPT | Performed by: PSYCHIATRY & NEUROLOGY

## 2023-04-04 RX ORDER — LEVETIRACETAM 500 MG/1
500 TABLET ORAL 2 TIMES DAILY
Qty: 200 TABLET | Refills: 12 | Status: SHIPPED | OUTPATIENT
Start: 2023-04-04 | End: 2024-05-07

## 2023-04-04 ASSESSMENT — PATIENT HEALTH QUESTIONNAIRE - PHQ9: CLINICAL INTERPRETATION OF PHQ2 SCORE: 0

## 2023-04-04 NOTE — Clinical Note
Follow up with me in 6 months from now. Brother PAUL must come with patient ! Epilepsy. Concern for Major Neurocognitive Disorder- onset of speech and memory decline over the last 12-16 months. Brother (Paul)- should accompany Flex to this visit> Paul's # is [phone]  Aman
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
## 2023-04-05 ENCOUNTER — TELEPHONE (OUTPATIENT)
Dept: NEUROLOGY | Facility: MEDICAL CENTER | Age: 63
End: 2023-04-05
Payer: MEDICAID

## 2023-04-05 NOTE — TELEPHONE ENCOUNTER
----- Message from Ender Mooney M.D. sent at 4/4/2023  5:01 PM PDT -----  Follow up with me in 6 months from now.  Brother RICA must come with patient !  Epilepsy.  Concern for Major Neurocognitive Disorder- onset of speech and memory decline over the last 12-16 months.  Brother (Rica)- should accompany Flex to this visit> Rica's # is [phone]    [place]

## 2023-04-05 NOTE — TELEPHONE ENCOUNTER
Called Paul pt brother, scheduled pt for a follow up in 6 months. Notified Paul to be present at appt time.

## 2023-04-07 ENCOUNTER — TELEPHONE (OUTPATIENT)
Dept: CARDIOLOGY | Facility: MEDICAL CENTER | Age: 63
End: 2023-04-07
Payer: MEDICAID

## 2023-04-07 NOTE — TELEPHONE ENCOUNTER
Called patient to request records for NP appointment with . Called to confirm if this will be first time patient is seeing a cardiologist. No answer, left voicemail to call back.

## 2023-04-20 ENCOUNTER — OFFICE VISIT (OUTPATIENT)
Dept: CARDIOLOGY | Facility: MEDICAL CENTER | Age: 63
End: 2023-04-20
Payer: MEDICAID

## 2023-04-20 VITALS
WEIGHT: 204 LBS | RESPIRATION RATE: 16 BRPM | OXYGEN SATURATION: 95 % | BODY MASS INDEX: 29.2 KG/M2 | HEIGHT: 70 IN | DIASTOLIC BLOOD PRESSURE: 62 MMHG | SYSTOLIC BLOOD PRESSURE: 110 MMHG | HEART RATE: 78 BPM

## 2023-04-20 DIAGNOSIS — Z86.79 S/P AORTIC ANEURYSM REPAIR: ICD-10-CM

## 2023-04-20 DIAGNOSIS — I49.5 SICK SINUS SYNDROME (HCC): ICD-10-CM

## 2023-04-20 DIAGNOSIS — Z95.0 S/P PLACEMENT OF CARDIAC PACEMAKER: ICD-10-CM

## 2023-04-20 DIAGNOSIS — I77.810 ASCENDING AORTA DILATION (HCC): ICD-10-CM

## 2023-04-20 DIAGNOSIS — Z95.2 H/O AORTIC VALVE REPLACEMENT: ICD-10-CM

## 2023-04-20 DIAGNOSIS — Z98.890 S/P AORTIC ANEURYSM REPAIR: ICD-10-CM

## 2023-04-20 DIAGNOSIS — I35.0 NONRHEUMATIC AORTIC VALVE STENOSIS: ICD-10-CM

## 2023-04-20 PROCEDURE — 99214 OFFICE O/P EST MOD 30 MIN: CPT | Performed by: STUDENT IN AN ORGANIZED HEALTH CARE EDUCATION/TRAINING PROGRAM

## 2023-04-20 ASSESSMENT — ENCOUNTER SYMPTOMS
WEAKNESS: 0
IRREGULAR HEARTBEAT: 0
ABDOMINAL PAIN: 0
NEAR-SYNCOPE: 0
DIARRHEA: 0
NAUSEA: 0
SHORTNESS OF BREATH: 0
SYNCOPE: 0
PND: 0
WHEEZING: 0
PALPITATIONS: 0
ORTHOPNEA: 0
FOCAL WEAKNESS: 0
DYSPNEA ON EXERTION: 0
VOMITING: 0
FEVER: 0
NIGHT SWEATS: 0
DIZZINESS: 0
COUGH: 0

## 2023-04-20 ASSESSMENT — FIBROSIS 4 INDEX: FIB4 SCORE: 1.51

## 2023-04-20 NOTE — PROGRESS NOTES
Cardiology Follow-up Consultation Note    Date of note:    4/20/2023    Primary Care Provider: Pcp Pt States None     Patient Name: Flex Perrin   YOB: 1960  MRN:              [id number]    Chief Complaint: Follow-up hospital discharge    History of Present Illness: Mr. Flex Perrin is a 62 y.o. male whose current medical problems include ascending aortic aneurysm status post repair, severe aortic stenosis status post aortic valve replacement 3/2017, AV block status post Medtronic dual chamber pacemaker placement 2018, and seizure disorder who is here for follow-up hospital discharge.    The patient was hospitalized 2/25-2/28/2023 for slurred speech and unsteady gait.  It was thought that the symptoms were related to seizures.  Cardiology was consulted during the hospitalization for tachycardia.  The patient was recommended for Zio patch and a cardiology referral.  However, the patient does have pacemaker.  The patient reports that he wore the ziopatch but no records are available.     The patient returns today for follow-up.  The patient reports that he wore the event monitor, but there are no records of the Zio patch.  He denies any symptoms currently.  He is active, walks every other day for about 3 miles without any symptoms.  He denies any chest pain or shortness of breath exertion.  No orthopnea, PND, or leg swelling.  No palpitations.  No syncope or presyncopal episodes.      Cardiovascular Risk Factors:  1. Smoking status: Never smoker  2. Type II Diabetes Mellitus: None  Lab Results   Component Value Date/Time    HBA1C 5.4 04/12/2018 02:56 PM    HBA1C 5.1 03/21/2017 10:48 AM     3. Hypertension: None  4. Dyslipidemia: None  Cholesterol,Tot   Date Value Ref Range Status   04/03/2022 121 100 - 199 mg/dL Final     LDL   Date Value Ref Range Status   04/03/2022 77 <100 mg/dL Final     HDL   Date Value Ref Range Status   04/03/2022 25 (A) >=40 mg/dL Final     Triglycerides   Date Value Ref  "Range Status   04/03/2022 95 0 - 149 mg/dL Final     5. Family history of early Coronary Artery Disease in a first degree relative (Male less than 55 years of age; Female less than 65 years of age): None  6.  Obesity and/or Metabolic Syndrome: Body mass index is 29.27 kg/m².  7. Sedentary lifestyle: Active (walks every other day about 2 miles)    Review of Systems   Constitutional: Negative for fever, malaise/fatigue and night sweats.   Cardiovascular:  Negative for chest pain, dyspnea on exertion, irregular heartbeat, leg swelling, near-syncope, orthopnea, palpitations, paroxysmal nocturnal dyspnea and syncope.   Respiratory:  Negative for cough, shortness of breath and wheezing.    Gastrointestinal:  Negative for abdominal pain, diarrhea, nausea and vomiting.   Neurological:  Negative for dizziness, focal weakness and weakness.       All other systems reviewed and are negative.     Current Outpatient Medications   Medication Sig Dispense Refill    multivitamin Tab Take 1 Tablet by mouth every day.      levETIRAcetam (KEPPRA) 500 MG Tab Take 1 Tablet by mouth 2 times a day. 200 Tablet 12    divalproex ER (DEPAKOTE ER) 500 MG TABLET SR 24 HR Take 1 Tablet by mouth 2 times a day. (Patient not taking: Reported on 4/20/2023) 30 Tablet 3     No current facility-administered medications for this visit.         No Known Allergies      Physical Exam:  Ambulatory Vitals  /62 (BP Location: Left arm, Patient Position: Sitting, BP Cuff Size: Adult)   Pulse 78   Resp 16   Ht 1.778 m (5' 10\")   Wt 92.5 kg (204 lb)   SpO2 95%    Oxygen Therapy:  Pulse Oximetry: 95 %  BP Readings from Last 4 Encounters:   04/20/23 110/62   04/04/23 130/86   02/28/23 130/84   04/03/22 (!) 168/90       Weight/BMI: Body mass index is 29.27 kg/m².  Wt Readings from Last 4 Encounters:   04/20/23 92.5 kg (204 lb)   02/25/23 100 kg (220 lb 14.4 oz)   04/02/22 100 kg (221 lb 5.5 oz)   06/08/21 101 kg (223 lb 12.3 oz)         General: Well " appearing and in no apparent distress  Eyes: nl conjunctiva, no icteric sclera  ENT: wearing a mask, normal external appearance of ears  Neck: no visible JVP,  no carotid bruits  Lungs: normal respiratory effort, CTAB  Heart: RRR, no murmurs, no rubs or gallops,  no edema bilateral lower extremities. No LV/RV heave on cardiac palpatation. + bilateral radial pulses.  + bilateral dp pulses.   Abdomen: soft, non tender, non distended, no masses, normal bowel sounds.  No HSM.  Extremities/MSK: no clubbing, no cyanosis  Neurological: No focal sensory deficits  Psychiatric: Appropriate affect, A/O x 3, intact judgement and insight  Skin: Warm extremities      Lab Data Review:  Lab Results   Component Value Date/Time    CHOLSTRLTOT 121 04/03/2022 03:48 AM    LDL 77 04/03/2022 03:48 AM    HDL 25 (A) 04/03/2022 03:48 AM    TRIGLYCERIDE 95 04/03/2022 03:48 AM       Lab Results   Component Value Date/Time    SODIUM 141 02/26/2023 10:23 PM    POTASSIUM 3.9 02/26/2023 10:23 PM    CHLORIDE 108 02/26/2023 10:23 PM    CO2 22 02/26/2023 10:23 PM    GLUCOSE 98 02/26/2023 10:23 PM    BUN 20 02/26/2023 10:23 PM    CREATININE 0.71 02/26/2023 10:23 PM    GLOMRATE 97 06/11/2022 01:19 PM     Lab Results   Component Value Date/Time    ALKPHOSPHAT 66 02/25/2023 01:04 PM    ASTSGOT 26 02/25/2023 01:04 PM    ALTSGPT 34 02/25/2023 01:04 PM    TBILIRUBIN 0.6 02/25/2023 01:04 PM      Lab Results   Component Value Date/Time    WBC 6.4 02/25/2023 01:04 PM     Lab Results   Component Value Date/Time    HBA1C 5.4 04/12/2018 02:56 PM    HBA1C 5.1 03/21/2017 10:48 AM         Cardiac Imaging and Procedures Review:    EKG dated 4/3/2022: My personal interpretation is sinus rhythm, LAFB    Echo dated 4/3/2022:   CONCLUSIONS  Fair quality study.  The left ventricular ejection fraction is visually estimated to be 65-  70%.  Probably mild concentric left ventricular hypertrophy.  Known bioprosthetic aortic valve that is functioning normally: V-max   1.14  m/s, MG 3 mmHg.  Estimated right ventricular systolic pressure is 30 mmHg.  Pacer/ICD wire seen in the right ventricle.     Compared to prior echo 06/12/21, no significant change.    Nuclear Perfusion Imaging (4/3/2022):   NUCLEAR IMAGING INTERPRETATION   No evidence of significant jeopardized viable myocardium or prior myocardial    infarction.   Normal left ventricular size, ejection fraction, and wall motion.   ECG INTERPRETATION   Normal ECG portion of a treadmill nuclear stress test.   Good exercise tolerance for age.   Normal BP response to exercise.   Sinus rhythm.    No ischemic ECG changes.  1 mm upsloping ST depression.  Rare PVCs.   No chest pain during stress.   [facility] treadmill score 5, low risk (assume no prior CAD).        Assessment & Plan     1. Ascending aorta dilation (HCC)  EC-ECHOCARDIOGRAM COMPLETE W/O CONT      2. S/P aortic aneurysm repair  EC-ECHOCARDIOGRAM COMPLETE W/O CONT      3. Nonrheumatic aortic valve stenosis  EC-ECHOCARDIOGRAM COMPLETE W/O CONT      4. H/O aortic valve replacement  EC-ECHOCARDIOGRAM COMPLETE W/O CONT      5. S/P placement of cardiac pacemaker        6. Sick sinus syndrome (HCC)              Shared Medical Decision Making:    Ascending aortic aneurysm status post repair 3/2017  Severe aortic stenosis status post aortic valve replacement 3/2017  Last echo 4/2022 showed normal LVEF with normally functioning bioprosthetic aortic valve.  Euvolemic on exam  -Repeat echocardiogram prior to next visit    Sinus syndrome  Status post pacemaker placement  -Follow-up in device clinic.  -Patient reportedly had tachycardia while hospitalized, Zio patch was placed on discharge, but no records available.  However, the patient does have a pacemaker, and we will plan to interrogate the device.    All of the patient's excellent questions were answered to the best of my knowledge and to his satisfaction.  It was a pleasure seeing Mr. Flex Perrin in my clinic today. Return in about 4  months (around 8/20/2023). Patient is aware to call the cardiology clinic with any questions or concerns.      Preston Cuellar MD  [facility], [facility]  [address] [address]  Phone: [phone]  Fax: [phone]

## 2023-05-10 ENCOUNTER — NON-PROVIDER VISIT (OUTPATIENT)
Dept: CARDIOLOGY | Facility: MEDICAL CENTER | Age: 63
End: 2023-05-10

## 2023-05-10 ENCOUNTER — TELEPHONE (OUTPATIENT)
Dept: CARDIOLOGY | Facility: MEDICAL CENTER | Age: 63
End: 2023-05-10

## 2023-05-10 ENCOUNTER — NON-PROVIDER VISIT (OUTPATIENT)
Dept: CARDIOLOGY | Facility: MEDICAL CENTER | Age: 63
End: 2023-05-10
Payer: MEDICAID

## 2023-05-10 DIAGNOSIS — I49.5 SICK SINUS SYNDROME (HCC): ICD-10-CM

## 2023-05-10 DIAGNOSIS — Z95.0 CARDIAC PACEMAKER IN SITU: ICD-10-CM

## 2023-05-10 PROCEDURE — 93280 PM DEVICE PROGR EVAL DUAL: CPT | Performed by: INTERNAL MEDICINE

## 2023-05-10 NOTE — TELEPHONE ENCOUNTER
Phone Number Called: [phone]    Call outcome: Spoke to patient regarding message below.    Message: Called to inform patient of HK recommendations to begin Eliquis 5 mg BID.     Patient reports that he does not recall any symptoms of a-fib around the time of the report. Patient also would like to speak to HK since a-fib only happened once.     RN did discuss reason for medication. Patient verbalized understanding.     RN to reach out to HK for further recommendations.

## 2023-05-10 NOTE — TELEPHONE ENCOUNTER
FYI--patient seen in device clinic.  Device functioning appropriately.  Patient had AF episode 10/23/22 lasting 18 minutes--no other AF episodes noted since last in office check of 6/2021.  Brief AT/SVT episodes noted.     Patient is not on AC

## 2023-05-11 NOTE — TELEPHONE ENCOUNTER
Phone Number Called: [phone]    Call outcome: Spoke to patient regarding message below.    Message: Called to inform patient of HK recommendations. Informed patient he can wait to begin Eliquis per HK. Patient verbalized understanding. RN attempted to schedule patient sooner, but unable to find opening in HK schedule. Routed to scheduling for assistance.

## 2023-05-16 NOTE — CARDIAC REMOTE MONITOR - SCAN
Device transmission reviewed. Device demonstrated appropriate function.       Electronically Signed by: Benito Wilkins M.D.    5/16/2023  2:05 PM
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
## 2023-07-17 ENCOUNTER — HOSPITAL ENCOUNTER (EMERGENCY)
Facility: MEDICAL CENTER | Age: 63
End: 2023-07-17
Attending: EMERGENCY MEDICINE
Payer: MEDICAID

## 2023-07-17 ENCOUNTER — APPOINTMENT (OUTPATIENT)
Dept: RADIOLOGY | Facility: MEDICAL CENTER | Age: 63
End: 2023-07-17
Attending: EMERGENCY MEDICINE
Payer: MEDICAID

## 2023-07-17 VITALS
SYSTOLIC BLOOD PRESSURE: 129 MMHG | HEART RATE: 75 BPM | TEMPERATURE: 98.1 F | HEIGHT: 70 IN | BODY MASS INDEX: 27.65 KG/M2 | OXYGEN SATURATION: 95 % | WEIGHT: 193.12 LBS | RESPIRATION RATE: 18 BRPM | DIASTOLIC BLOOD PRESSURE: 89 MMHG

## 2023-07-17 DIAGNOSIS — R07.89 CHEST WALL PAIN: ICD-10-CM

## 2023-07-17 LAB — EKG IMPRESSION: NORMAL

## 2023-07-17 PROCEDURE — 99283 EMERGENCY DEPT VISIT LOW MDM: CPT

## 2023-07-17 PROCEDURE — 93005 ELECTROCARDIOGRAM TRACING: CPT

## 2023-07-17 PROCEDURE — 93005 ELECTROCARDIOGRAM TRACING: CPT | Performed by: EMERGENCY MEDICINE

## 2023-07-17 ASSESSMENT — FIBROSIS 4 INDEX: FIB4 SCORE: 1.51
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
## 2023-07-17 NOTE — ED NOTES
Pt not in room. Per imaging, X-Ray tech attempted to xray pt twice.  The pt was not in the room either time. Appears pt has eloped. Pt discharged
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
## 2023-07-19 ENCOUNTER — OFFICE VISIT (OUTPATIENT)
Dept: CARDIOLOGY | Facility: MEDICAL CENTER | Age: 63
End: 2023-07-19
Attending: STUDENT IN AN ORGANIZED HEALTH CARE EDUCATION/TRAINING PROGRAM
Payer: MEDICAID

## 2023-07-19 VITALS
HEART RATE: 66 BPM | OXYGEN SATURATION: 96 % | DIASTOLIC BLOOD PRESSURE: 68 MMHG | SYSTOLIC BLOOD PRESSURE: 112 MMHG | BODY MASS INDEX: 27.86 KG/M2 | WEIGHT: 194.6 LBS | HEIGHT: 70 IN | RESPIRATION RATE: 18 BRPM

## 2023-07-19 DIAGNOSIS — Z95.0 CARDIAC PACEMAKER IN SITU: ICD-10-CM

## 2023-07-19 DIAGNOSIS — I47.19 ATRIAL TACHYCARDIA (HCC): ICD-10-CM

## 2023-07-19 DIAGNOSIS — Z95.2 H/O AORTIC VALVE REPLACEMENT: ICD-10-CM

## 2023-07-19 DIAGNOSIS — I49.5 SICK SINUS SYNDROME (HCC): ICD-10-CM

## 2023-07-19 DIAGNOSIS — I35.0 NONRHEUMATIC AORTIC VALVE STENOSIS: ICD-10-CM

## 2023-07-19 DIAGNOSIS — Z98.890 S/P AORTIC ANEURYSM REPAIR: ICD-10-CM

## 2023-07-19 DIAGNOSIS — I77.810 ASCENDING AORTA DILATION (HCC): ICD-10-CM

## 2023-07-19 DIAGNOSIS — Z86.79 S/P AORTIC ANEURYSM REPAIR: ICD-10-CM

## 2023-07-19 PROCEDURE — 99211 OFF/OP EST MAY X REQ PHY/QHP: CPT | Performed by: STUDENT IN AN ORGANIZED HEALTH CARE EDUCATION/TRAINING PROGRAM

## 2023-07-19 PROCEDURE — 99214 OFFICE O/P EST MOD 30 MIN: CPT | Performed by: STUDENT IN AN ORGANIZED HEALTH CARE EDUCATION/TRAINING PROGRAM

## 2023-07-19 PROCEDURE — 3078F DIAST BP <80 MM HG: CPT | Performed by: STUDENT IN AN ORGANIZED HEALTH CARE EDUCATION/TRAINING PROGRAM

## 2023-07-19 PROCEDURE — 3074F SYST BP LT 130 MM HG: CPT | Performed by: STUDENT IN AN ORGANIZED HEALTH CARE EDUCATION/TRAINING PROGRAM

## 2023-07-19 ASSESSMENT — ENCOUNTER SYMPTOMS
NAUSEA: 0
VOMITING: 0
WHEEZING: 0
DYSPNEA ON EXERTION: 0
PND: 0
IRREGULAR HEARTBEAT: 0
ABDOMINAL PAIN: 0
SYNCOPE: 0
NIGHT SWEATS: 0
COUGH: 0
NEAR-SYNCOPE: 0
PALPITATIONS: 0
ORTHOPNEA: 0
WEAKNESS: 0
SHORTNESS OF BREATH: 0
FEVER: 0
DIARRHEA: 0
FOCAL WEAKNESS: 0
DIZZINESS: 0

## 2023-07-19 ASSESSMENT — FIBROSIS 4 INDEX: FIB4 SCORE: 1.51

## 2023-07-19 NOTE — PROGRESS NOTES
Cardiology Follow-up Consultation Note    Date of note:    07/19/23  Primary Care Provider: Pcp Pt States None     Patient Name: Flex Perrin   YOB: 1960  MRN:              [id number]    Chief Complaint: Follow-up     History of Present Illness: Mr. Flex Perrin is a 62 y.o. male whose current medical problems include ascending aortic aneurysm status post repair, severe aortic stenosis status post aortic valve replacement 3/2017, AV block status post [employer] dual chamber pacemaker placement 2018, and seizure disorder who is here for follow-up.    The patient was last seen in my clinic on 4/20/2023 after being hospitalized 2/25-2/28/2023 for slurred speech and unsteady gait.  It was thought that the symptoms were related to seizures.  Cardiology was consulted during the hospitalization for tachycardia.  The patient was recommended for Zio patch and a cardiology referral.  However, the patient does have pacemaker.  Since the last visit, the patient underwent a pacer check, which showed 40 episodes of sinus tach/atrial tachycardia.  No evidence of AF per report.  Repeat echocardiogram was ordered, which is pending. The patient was recently in the ER after accidentally hitting the trash can to his pacer. But he left prior to full evaluation.      The patient returns today for follow-up.  The patient reports feeling well today.  The patient reports that he is having issues with Keppra, and has appointment with neurology to discuss.  Otherwise, he is feeling fine in terms of cardiac issues without any chest pain or shortness of breath.  No orthopnea, PND, or leg swelling.  No palpitations.  No syncope or presyncopal episodes.      Cardiovascular Risk Factors:  1. Smoking status: Never smoker  2. Type II Diabetes Mellitus: None  Lab Results   Component Value Date/Time    HBA1C 5.4 04/12/2018 02:56 PM    HBA1C 5.1 03/21/2017 10:48 AM     3. Hypertension: None  4. Dyslipidemia: None  Cholesterol,Tot  "  Date Value Ref Range Status   04/03/2022 121 100 - 199 mg/dL Final     LDL   Date Value Ref Range Status   04/03/2022 77 <100 mg/dL Final     HDL   Date Value Ref Range Status   04/03/2022 25 (A) >=40 mg/dL Final     Triglycerides   Date Value Ref Range Status   04/03/2022 95 0 - 149 mg/dL Final     5. Family history of early Coronary Artery Disease in a first degree relative (Male less than 55 years of age; Female less than 65 years of age): None  6.  Obesity and/or Metabolic Syndrome: Body mass index is 27.92 kg/m².  7. Sedentary lifestyle: Active (walks every other day about 2 miles)    Review of Systems   Constitutional: Negative for fever, malaise/fatigue and night sweats.   Cardiovascular:  Negative for chest pain, dyspnea on exertion, irregular heartbeat, leg swelling, near-syncope, orthopnea, palpitations, paroxysmal nocturnal dyspnea and syncope.   Respiratory:  Negative for cough, shortness of breath and wheezing.    Gastrointestinal:  Negative for abdominal pain, diarrhea, nausea and vomiting.   Neurological:  Negative for dizziness, focal weakness and weakness.         All other systems reviewed and are negative.     Current Outpatient Medications   Medication Sig Dispense Refill    multivitamin Tab Take 1 Tablet by mouth every day.      levETIRAcetam (KEPPRA) 500 MG Tab Take 1 Tablet by mouth 2 times a day. 200 Tablet 12    divalproex ER (DEPAKOTE ER) 500 MG TABLET SR 24 HR Take 1 Tablet by mouth 2 times a day. (Patient not taking: Reported on 4/20/2023) 30 Tablet 3     No current facility-administered medications for this visit.         No Known Allergies      Physical Exam:  Ambulatory Vitals  /68 (BP Location: Left arm, Patient Position: Sitting, BP Cuff Size: Adult)   Pulse 66   Resp 18   Ht 1.778 m (5' 10\")   Wt 88.3 kg (194 lb 9.6 oz)   SpO2 96%    Oxygen Therapy:  Pulse Oximetry: 96 %  BP Readings from Last 4 Encounters:   07/19/23 112/68   07/17/23 129/89   04/20/23 110/62 "   04/04/23 130/86       Weight/BMI: Body mass index is 27.92 kg/m².  Wt Readings from Last 4 Encounters:   07/19/23 88.3 kg (194 lb 9.6 oz)   07/17/23 87.6 kg (193 lb 2 oz)   04/20/23 92.5 kg (204 lb)   02/25/23 100 kg (220 lb 14.4 oz)         General: Well appearing and in no apparent distress  Eyes: nl conjunctiva, no icteric sclera  ENT: wearing a mask, normal external appearance of ears  Neck: no visible JVP,  no carotid bruits  Lungs: normal respiratory effort, CTAB  Heart: RRR, no murmurs, no rubs or gallops,  no edema bilateral lower extremities. No LV/RV heave on cardiac palpatation. + bilateral radial pulses.  + bilateral dp pulses.   Abdomen: soft, non tender, non distended, no masses, normal bowel sounds.  No HSM.  Extremities/MSK: no clubbing, no cyanosis  Neurological: No focal sensory deficits  Psychiatric: Appropriate affect, A/O x 3, intact judgement and insight  Skin: Warm extremities      Lab Data Review:  Lab Results   Component Value Date/Time    CHOLSTRLTOT 121 04/03/2022 03:48 AM    LDL 77 04/03/2022 03:48 AM    HDL 25 (A) 04/03/2022 03:48 AM    TRIGLYCERIDE 95 04/03/2022 03:48 AM       Lab Results   Component Value Date/Time    SODIUM 141 02/26/2023 10:23 PM    POTASSIUM 3.9 02/26/2023 10:23 PM    CHLORIDE 108 02/26/2023 10:23 PM    CO2 22 02/26/2023 10:23 PM    GLUCOSE 98 02/26/2023 10:23 PM    BUN 20 02/26/2023 10:23 PM    CREATININE 0.71 02/26/2023 10:23 PM    GLOMRATE 97 06/11/2022 01:19 PM     Lab Results   Component Value Date/Time    ALKPHOSPHAT 66 02/25/2023 01:04 PM    ASTSGOT 26 02/25/2023 01:04 PM    ALTSGPT 34 02/25/2023 01:04 PM    TBILIRUBIN 0.6 02/25/2023 01:04 PM      Lab Results   Component Value Date/Time    WBC 6.4 02/25/2023 01:04 PM     Lab Results   Component Value Date/Time    HBA1C 5.4 04/12/2018 02:56 PM    HBA1C 5.1 03/21/2017 10:48 AM         Cardiac Imaging and Procedures Review:    EKG dated 4/3/2022: My personal interpretation is sinus rhythm, LAFB    Echo dated  4/3/2022:   CONCLUSIONS  Fair quality study.  The left ventricular ejection fraction is visually estimated to be 65-  70%.  Probably mild concentric left ventricular hypertrophy.  Known bioprosthetic aortic valve that is functioning normally: V-max   1.14 m/s, MG 3 mmHg.  Estimated right ventricular systolic pressure is 30 mmHg.  Pacer/ICD wire seen in the right ventricle.     Compared to prior echo 06/12/21, no significant change.    Nuclear Perfusion Imaging (4/3/2022):   NUCLEAR IMAGING INTERPRETATION   No evidence of significant jeopardized viable myocardium or prior myocardial    infarction.   Normal left ventricular size, ejection fraction, and wall motion.   ECG INTERPRETATION   Normal ECG portion of a treadmill nuclear stress test.   Good exercise tolerance for age.   Normal BP response to exercise.   Sinus rhythm.    No ischemic ECG changes.  1 mm upsloping ST depression.  Rare PVCs.   No chest pain during stress.   [facility] treadmill score 5, low risk (assume no prior CAD).        Assessment & Plan     1. Ascending aorta dilation (HCC)        2. S/P aortic aneurysm repair        3. Nonrheumatic aortic valve stenosis        4. H/O aortic valve replacement        5. Sick sinus syndrome (HCC)        6. Cardiac pacemaker in situ        7. Atrial tachycardia (HCC)              Shared Medical Decision Making:    Ascending aortic aneurysm status post repair 3/2017  Severe aortic stenosis status post aortic valve replacement 3/2017  Last echo 4/2022 showed normal LVEF with normally functioning bioprosthetic aortic valve.  Euvolemic on exam  -Repeat echocardiogram pending next month    Sinus syndrome  Status post pacemaker placement  -We will arrange a closer follow-up with device clinic given recent incident that led him to the ER visit (accidentally hitting trash can close to his pacemaker)  -Device appears normal on physical exam.    Atrial tachycardia  -Discussed starting low dose metoprolol, which the patient  would like to hold off as he's asymptomatic, which I think is reasonable.   -Reassess symptoms next visit    All of the patient's excellent questions were answered to the best of my knowledge and to his satisfaction.  It was a pleasure seeing Mr. Flex Perrin in my clinic today. No follow-ups on file. Patient is aware to call the cardiology clinic with any questions or concerns.      Preston Cuellar MD  [facility], [address]  [address] [address]  Phone: [phone]  Fax: [phone]

## 2023-07-19 NOTE — PATIENT INSTRUCTIONS
-Schedule to device check sooner  -Do not miss appointment for echocardiogram  -You can schedule to follow up with me in 3 months.

## 2023-07-24 ENCOUNTER — APPOINTMENT (OUTPATIENT)
Dept: RADIOLOGY | Facility: MEDICAL CENTER | Age: 63
End: 2023-07-24
Attending: EMERGENCY MEDICINE
Payer: MEDICAID

## 2023-07-24 ENCOUNTER — HOSPITAL ENCOUNTER (EMERGENCY)
Facility: MEDICAL CENTER | Age: 63
End: 2023-07-24
Attending: EMERGENCY MEDICINE
Payer: MEDICAID

## 2023-07-24 ENCOUNTER — TELEPHONE (OUTPATIENT)
Dept: CARDIOLOGY | Facility: MEDICAL CENTER | Age: 63
End: 2023-07-24
Payer: MEDICAID

## 2023-07-24 VITALS
OXYGEN SATURATION: 93 % | HEIGHT: 70 IN | HEART RATE: 63 BPM | DIASTOLIC BLOOD PRESSURE: 57 MMHG | RESPIRATION RATE: 18 BRPM | WEIGHT: 195 LBS | SYSTOLIC BLOOD PRESSURE: 108 MMHG | TEMPERATURE: 99 F | BODY MASS INDEX: 27.92 KG/M2

## 2023-07-24 DIAGNOSIS — S93.422A SPRAIN OF DELTOID LIGAMENT OF LEFT ANKLE, INITIAL ENCOUNTER: ICD-10-CM

## 2023-07-24 PROCEDURE — 99284 EMERGENCY DEPT VISIT MOD MDM: CPT

## 2023-07-24 PROCEDURE — 73610 X-RAY EXAM OF ANKLE: CPT | Mod: LT

## 2023-07-24 ASSESSMENT — FIBROSIS 4 INDEX: FIB4 SCORE: 1.51

## 2023-07-24 ASSESSMENT — LIFESTYLE VARIABLES: DO YOU DRINK ALCOHOL: NO

## 2023-07-24 ASSESSMENT — PAIN SCALES - WONG BAKER: WONGBAKER_NUMERICALRESPONSE: HURTS A LITTLE MORE
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
## 2023-07-24 NOTE — ED NOTES
[place] from [facility] called stating patient had 1 single short run non sustained VT 7/19 for approx 3secs, otherwise pacemake is working appropriately. Will fax over report.

## 2023-07-24 NOTE — ED TRIAGE NOTES
Chief Complaint   Patient presents with    Ankle Pain     Reports tripped over rock and twisted left foot and ankle. States did not fall, -head strike. +swelling. CMS intact     Pt WC to triage for above.     Protocol ordered. Pt to lobby, educated on triage process.

## 2023-07-24 NOTE — ED NOTES
Pt to room from [facility], stated he tripped and fell today, swelling noted on left ankle.  Side rails up and call light given to pt. Pt stated he may have had a seizure today and he spaces out. Denies loc and no head trauma

## 2023-07-24 NOTE — TELEPHONE ENCOUNTER
HK    Caller: Flex Perrin     Topic/issue: Pt had a pacer check done in the ER today 07/24/223 and wants to know if we are wanting him to make another appt for another one?    Callback Number: [phone] (home)      Thank You    Sharifa HERNDON

## 2023-07-25 ENCOUNTER — HOSPITAL ENCOUNTER (EMERGENCY)
Facility: MEDICAL CENTER | Age: 63
End: 2023-07-25
Attending: EMERGENCY MEDICINE
Payer: MEDICAID

## 2023-07-25 VITALS
RESPIRATION RATE: 18 BRPM | DIASTOLIC BLOOD PRESSURE: 73 MMHG | SYSTOLIC BLOOD PRESSURE: 131 MMHG | OXYGEN SATURATION: 96 % | BODY MASS INDEX: 27.46 KG/M2 | WEIGHT: 191.36 LBS | HEART RATE: 63 BPM | TEMPERATURE: 98.1 F

## 2023-07-25 DIAGNOSIS — S93.402A SPRAIN OF LEFT ANKLE, UNSPECIFIED LIGAMENT, INITIAL ENCOUNTER: ICD-10-CM

## 2023-07-25 DIAGNOSIS — T14.8XXA BLOOD BLISTER: ICD-10-CM

## 2023-07-25 DIAGNOSIS — G40.909 SEIZURE DISORDER (HCC): ICD-10-CM

## 2023-07-25 PROCEDURE — 700102 HCHG RX REV CODE 250 W/ 637 OVERRIDE(OP): Mod: UD | Performed by: EMERGENCY MEDICINE

## 2023-07-25 PROCEDURE — A6403 STERILE GAUZE>16 <= 48 SQ IN: HCPCS

## 2023-07-25 PROCEDURE — A9270 NON-COVERED ITEM OR SERVICE: HCPCS | Mod: UD | Performed by: EMERGENCY MEDICINE

## 2023-07-25 PROCEDURE — 99283 EMERGENCY DEPT VISIT LOW MDM: CPT

## 2023-07-25 RX ORDER — ACETAMINOPHEN 325 MG/1
650 TABLET ORAL ONCE
Status: COMPLETED | OUTPATIENT
Start: 2023-07-25 | End: 2023-07-25

## 2023-07-25 RX ADMIN — ACETAMINOPHEN 650 MG: 325 TABLET, FILM COATED ORAL at 14:13

## 2023-07-25 ASSESSMENT — FIBROSIS 4 INDEX: FIB4 SCORE: 1.51

## 2023-07-25 ASSESSMENT — PAIN DESCRIPTION - PAIN TYPE: TYPE: ACUTE PAIN

## 2023-07-25 NOTE — ED NOTES
Patient wheeled to [facility] [address] and transferred without incident. Primary assessment complete. Patient oriented to care area. Chart up for ERP.
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
## 2023-07-25 NOTE — ED TRIAGE NOTES
.Flex Perrin  .  Chief Complaint   Patient presents with    Ankle Pain     Patient c/o increase pain and swelling to left ankle.      Patient to triage with above complaint. Patient was seen yesterday for fall and dx with sprain. Patient reports increase in pain and swelling.     Patient to lobby and instructed to inform staff of any needs.    Addended by: JUNAID CUI on: 1/17/2017 06:48 PM     Modules accepted: Orders

## 2023-07-25 NOTE — ED NOTES
Discharge orders received. Discharge instructions provided by ERP. AVS provided and reviewed with patient. Patient AOx4 and ambulatory with crutches. No IV placed during ED visit. Patient discharged to family without incident.

## 2023-07-25 NOTE — ED NOTES
Bulky dressing with polysporin applied to patients Left ankle without incident. Short walking boot applied to left lower extremity. CMS intact before and after bandage/boot application.

## 2023-07-26 ENCOUNTER — OFFICE VISIT (OUTPATIENT)
Dept: INTERNAL MEDICINE | Facility: OTHER | Age: 63
End: 2023-07-26
Payer: MEDICAID

## 2023-07-26 VITALS
BODY MASS INDEX: 28.2 KG/M2 | HEIGHT: 70 IN | DIASTOLIC BLOOD PRESSURE: 71 MMHG | WEIGHT: 197 LBS | TEMPERATURE: 99.8 F | OXYGEN SATURATION: 93 % | HEART RATE: 60 BPM | SYSTOLIC BLOOD PRESSURE: 112 MMHG

## 2023-07-26 DIAGNOSIS — T14.8XXA BLOOD BLISTER: ICD-10-CM

## 2023-07-26 DIAGNOSIS — S93.402A SPRAIN OF LEFT ANKLE, UNSPECIFIED LIGAMENT, INITIAL ENCOUNTER: ICD-10-CM

## 2023-07-26 DIAGNOSIS — R56.9 SEIZURE (HCC): ICD-10-CM

## 2023-07-26 DIAGNOSIS — Z95.0 PACEMAKER: ICD-10-CM

## 2023-07-26 DIAGNOSIS — M72.0 DUPUYTREN CONTRACTURE: ICD-10-CM

## 2023-07-26 PROCEDURE — 3078F DIAST BP <80 MM HG: CPT

## 2023-07-26 PROCEDURE — 3074F SYST BP LT 130 MM HG: CPT

## 2023-07-26 PROCEDURE — 99203 OFFICE O/P NEW LOW 30 MIN: CPT | Mod: GC

## 2023-07-26 RX ORDER — BACITRACIN ZINC AND POLYMYXIN B SULFATE 500; 1000 [USP'U]/G; [USP'U]/G
OINTMENT TOPICAL ONCE
Status: COMPLETED | OUTPATIENT
Start: 2023-07-26 | End: 2023-07-26

## 2023-07-26 RX ADMIN — BACITRACIN ZINC AND POLYMYXIN B SULFATE: 500; 1000 OINTMENT TOPICAL at 18:01

## 2023-07-26 RX ADMIN — BACITRACIN ZINC AND POLYMYXIN B SULFATE: 500; 1000 OINTMENT TOPICAL at 18:03

## 2023-07-26 ASSESSMENT — ENCOUNTER SYMPTOMS
CARDIOVASCULAR NEGATIVE: 1
CONSTITUTIONAL NEGATIVE: 1
SEIZURES: 1
LOSS OF CONSCIOUSNESS: 1
RESPIRATORY NEGATIVE: 1
GASTROINTESTINAL NEGATIVE: 1
EYES NEGATIVE: 1
PSYCHIATRIC NEGATIVE: 1

## 2023-07-26 ASSESSMENT — FIBROSIS 4 INDEX: FIB4 SCORE: 1.51

## 2023-07-26 NOTE — TELEPHONE ENCOUNTER
LVM on pt's personal VM informing him that he can hold off on making a PM check appt at this time and discuss w/ HK at his next appt when she would like him to make his next one.
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
## 2023-07-27 ENCOUNTER — TELEPHONE (OUTPATIENT)
Dept: INTERNAL MEDICINE | Facility: OTHER | Age: 63
End: 2023-07-27
Payer: MEDICAID

## 2023-07-27 NOTE — TELEPHONE ENCOUNTER
Patient called because he had came in yesterday and was supposed to be prescribed something for blisters. He said he did not know the name of the ointment, but that when he went to the pharmacy to  the prescription, [facility] had said they didn't have anything for him.   If the patient could be called back to know what he is supposed to take.

## 2023-07-27 NOTE — TELEPHONE ENCOUNTER
Jose Carpenter,   Looks like in you were going to send in bacitracin-polymyxin b (Polysporin) 500-86808 UNIT/GM ointment, per your note but I do not see this prescribed yet. Can you please send in? Thank you!

## 2023-07-28 RX ORDER — BACITRACIN ZINC AND POLYMYXIN B SULFATE 500; 1000 [USP'U]/G; [USP'U]/G
1 OINTMENT TOPICAL 2 TIMES DAILY
Qty: 1 EACH | Refills: 1 | Status: SHIPPED | OUTPATIENT
Start: 2023-07-28

## 2023-08-04 ENCOUNTER — TELEPHONE (OUTPATIENT)
Dept: INTERNAL MEDICINE | Facility: OTHER | Age: 63
End: 2023-08-04
Payer: MEDICAID

## 2023-08-04 NOTE — TELEPHONE ENCOUNTER
Patient called asking if his Neuro referral that was written up on 07/26 can be resent to [facility].  Patient called to schedule with them and they stated they have not received the referral.

## 2023-08-10 ENCOUNTER — TELEPHONE (OUTPATIENT)
Dept: HEALTH INFORMATION MANAGEMENT | Facility: OTHER | Age: 63
End: 2023-08-10
Payer: MEDICAID

## 2023-08-17 ENCOUNTER — TELEPHONE (OUTPATIENT)
Dept: CARDIOLOGY | Facility: MEDICAL CENTER | Age: 63
End: 2023-08-17
Payer: MEDICAID

## 2023-08-17 NOTE — TELEPHONE ENCOUNTER
HK     Caller: Flex Perrin    Topic/issue: Patient is calling with concerns of an ache or discomfort feeling in the middle of his chest he has been experiencing for the last several days. Patient states it is not painful, and that he has no other symptoms at this time. Patient also mentions recently falling over and is unsure if the ache is related to this or due to other stress he is dealing with. Please advise.     Callback Number: [phone] (home)     Thank you,   Jackie IBARRA

## 2023-08-17 NOTE — TELEPHONE ENCOUNTER
Pt reports chest ache/discomfort that started a few days ago, it is intermittent, no other s/s, not active at this time. Pt does not correlate any triggers or exacerbating factors, it is random. Pt does not have recent BP/HR readings.     Pt reports that he is on crutches for a sprained ankle and he did slip about 1 week ago and landed on his back which has been sore since, he is not sure if chest aches are r/t that. He has not tried pain relievers to see if it helps but he has used an ice pack for his back. Pt will try Tylenol for chest ache/discomfort to see if it provides relief and may be muscle r/t.     Advised pt to monitor himself closely and go to the ER if episodes persist, become more frequent or severe, or if he develops s/s along with it.
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
## 2023-10-09 ENCOUNTER — TELEPHONE (OUTPATIENT)
Dept: HEALTH INFORMATION MANAGEMENT | Facility: OTHER | Age: 63
End: 2023-10-09
Payer: OTHER MISCELLANEOUS

## 2024-02-28 ENCOUNTER — TELEPHONE (OUTPATIENT)
Dept: CARDIOLOGY | Facility: MEDICAL CENTER | Age: 64
End: 2024-02-28
Payer: OTHER MISCELLANEOUS

## 2024-09-26 ENCOUNTER — OFFICE VISIT (OUTPATIENT)
Dept: URGENT CARE | Facility: CLINIC | Age: 64
End: 2024-09-26
Payer: MEDICAID

## 2024-09-26 VITALS
BODY MASS INDEX: 31.21 KG/M2 | SYSTOLIC BLOOD PRESSURE: 130 MMHG | HEART RATE: 70 BPM | WEIGHT: 218 LBS | TEMPERATURE: 96.6 F | DIASTOLIC BLOOD PRESSURE: 80 MMHG | OXYGEN SATURATION: 95 % | HEIGHT: 70 IN | RESPIRATION RATE: 16 BRPM

## 2024-09-26 DIAGNOSIS — S67.196A CRUSHING INJURY OF RIGHT LITTLE FINGER, INITIAL ENCOUNTER: ICD-10-CM

## 2024-09-26 DIAGNOSIS — S61.216A LACERATION OF RIGHT LITTLE FINGER WITHOUT FOREIGN BODY WITHOUT DAMAGE TO NAIL, INITIAL ENCOUNTER: ICD-10-CM

## 2024-09-26 RX ORDER — ONDANSETRON 4 MG/1
TABLET, ORALLY DISINTEGRATING ORAL
COMMUNITY

## 2024-09-26 RX ORDER — DIAZEPAM 5 MG
TABLET ORAL
COMMUNITY

## 2024-09-26 RX ORDER — DIVALPROEX SODIUM 500 MG/1
1 TABLET, FILM COATED, EXTENDED RELEASE ORAL 2 TIMES DAILY
COMMUNITY

## 2024-09-26 RX ORDER — DOXYCYCLINE HYCLATE 100 MG
1 TABLET ORAL 2 TIMES DAILY
COMMUNITY

## 2024-09-26 RX ORDER — MUPIROCIN 20 MG/G
1 OINTMENT TOPICAL 2 TIMES DAILY
Qty: 22 G | Refills: 0 | Status: SHIPPED | OUTPATIENT
Start: 2024-09-26

## 2024-09-26 RX ORDER — OMEGA-3S/DHA/EPA/FISH OIL/D3 300MG-1000
500 CAPSULE ORAL DAILY
COMMUNITY

## 2024-09-26 RX ORDER — CEPHALEXIN 500 MG/1
1 CAPSULE ORAL 4 TIMES DAILY
COMMUNITY

## 2024-09-26 RX ORDER — ZONISAMIDE 100 MG/1
CAPSULE ORAL
COMMUNITY

## 2024-09-26 RX ORDER — MELOXICAM 15 MG/1
TABLET ORAL
COMMUNITY
Start: 2024-09-18

## 2024-09-26 RX ORDER — HYDROCODONE BITARTRATE AND ACETAMINOPHEN 5; 325 MG/1; MG/1
TABLET ORAL
COMMUNITY

## 2024-09-26 RX ORDER — LEVETIRACETAM 750 MG/1
1 TABLET ORAL 2 TIMES DAILY
COMMUNITY
Start: 2024-07-31

## 2024-09-26 ASSESSMENT — ENCOUNTER SYMPTOMS
VOMITING: 0
NAUSEA: 0
HEADACHES: 0
DIARRHEA: 0
COUGH: 0
CHILLS: 0
ROS SKIN COMMENTS: WOUND
FEVER: 0
SORE THROAT: 0

## 2024-09-26 ASSESSMENT — FIBROSIS 4 INDEX: FIB4 SCORE: 1.61
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
## (undated) DEVICE — SUTURE 3-0 VICRYL PLUS SH - 27 INCH (36/BX)

## (undated) DEVICE — TUBING CLEARLINK DUO-VENT - C-FLO (48EA/CA)

## (undated) DEVICE — BAG, SPONGE COUNT 50600

## (undated) DEVICE — PERFUSION STAT

## (undated) DEVICE — PACK MAJOR ORTHO - (2EA/CA)

## (undated) DEVICE — SUTURE 2-0 ETHILON FS - (36/BX) 18 INCH

## (undated) DEVICE — GLOVE BIOGEL ECLIPSE PF LATEX SIZE 7.5

## (undated) DEVICE — TUBE CHEST 32FR. RIGHT ANGLED (10EA/CA)

## (undated) DEVICE — TOWEL STOP TIMEOUT SAFETY FLAG (40EA/CA)

## (undated) DEVICE — HEAD HOLDER JUNIOR/ADULT

## (undated) DEVICE — ELECTRODE 850 FOAM ADHESIVE - HYDROGEL RADIOTRNSPRNT (50/PK)

## (undated) DEVICE — DRESSING XEROFORM 1X8 - (50/BX 4BX/CA)

## (undated) DEVICE — GOWN WARMING STANDARD FLEX - (30/CA)

## (undated) DEVICE — SUTURE GENERAL

## (undated) DEVICE — ORGANIZER SUTURE GABBAY-FRAT - ER STERILE (3/SET 4ST/BX)

## (undated) DEVICE — KIT ANESTHESIA W/CIRCUIT & 3/LT BAG W/FILTER (20EA/CA)

## (undated) DEVICE — SET LEADWIRE 5 LEAD BEDSIDE DISPOSABLE ECG (1SET OF 5/EA)

## (undated) DEVICE — KIT ROOM DECONTAMINATION

## (undated) DEVICE — SUTURE 4-0 30CM STRATAFIX SPIRAL PS-2 (12EA/BX)

## (undated) DEVICE — SODIUM CHL IRRIGATION 0.9% 1000ML (12EA/CA)

## (undated) DEVICE — TUBE E-T HI-LO CUFF 8.0MM (10EA/PK)

## (undated) DEVICE — SOD. CHL. INJ. 0.9% 1000 ML - (14EA/CA 60CA/PF)

## (undated) DEVICE — DRAPE LOWER EXTREMETY - (6/CA)

## (undated) DEVICE — NEPTUNE 4 PORT MANIFOLD - (20/PK)

## (undated) DEVICE — QUICKLOADS COR-KNOT TITANIUM - (12/BX)

## (undated) DEVICE — SYS DLV COST CLS RM TEMP - INJECTATE (CO-SET II) (10EA/CA)

## (undated) DEVICE — GLOVE BIOGEL PI INDICATOR SZ 6.5 SURGICAL PF LF - (50/BX 4BX/CA)

## (undated) DEVICE — CANISTER SUCTION 3000ML MECHANICAL FILTER AUTO SHUTOFF MEDI-VAC NONSTERILE LF DISP  (40EA/CA)

## (undated) DEVICE — SUCTION INSTRUMENT YANKAUER BULBOUS TIP W/O VENT (50EA/CA)

## (undated) DEVICE — SLEEVE, VASO, THIGH, MED

## (undated) DEVICE — PROTECTOR ULNA NERVE - (36PR/CA)

## (undated) DEVICE — SET EXTENSION WITH 2 PORTS (48EA/CA) ***PART #2C8610 IS A SUBSTITUTE*****

## (undated) DEVICE — GOWN SURGEONS LARGE - (32/CA)

## (undated) DEVICE — LACTATED RINGERS INJ 1000 ML - (14EA/CA 60CA/PF)

## (undated) DEVICE — SENSOR CEREBRAL AND SOMATIC MONITORING (20/CA)

## (undated) DEVICE — SUTURE 5-0 PROLENE C-1 D/A 24 (36PK/BX)"

## (undated) DEVICE — MICRODRIP PRIMARY VENTED 60 (48EA/CA) THIS WAS PART #2C8428 WHICH WAS DISCONTINUED

## (undated) DEVICE — GLOVE BIOGEL INDICATOR SZ 8 SURGICAL PF LTX - (50/BX 4BX/CA)

## (undated) DEVICE — SODIUM CHL. INJ. 0.9% 500ML (24EA/CA 50CA/PF)

## (undated) DEVICE — SET SUCTION/IRRIGATION WITH DISPOSABLE TIP (6/CA )PART #0250-070-520 IS A SUB

## (undated) DEVICE — CLOSURE WOUND 1/4 X 4 (STERI - STRIP) (50/BX 4BX/CA)

## (undated) DEVICE — NEEDLE INSFL 120MM 14GA VRRS - (20/BX)

## (undated) DEVICE — SENSOR SPO2 NEO LNCS ADHESIVE (20/BX) SEE USER NOTES

## (undated) DEVICE — PERFUSION

## (undated) DEVICE — Device

## (undated) DEVICE — SET BIFURCATED BLOOD - (48EA/CS)

## (undated) DEVICE — DRESSING TRANSPARENT FILM TEGADERM 2.375 X 2.75"  (100EA/BX)"

## (undated) DEVICE — LEAD PACING TEMP MYO - (12/BX)

## (undated) DEVICE — DEVICE KIT COR-KNOT COMBO2 DEVICES & 12 KNOTS PER KIT (6KT/CA)

## (undated) DEVICE — SUTURE 3-0 ETHILON FSLX 30 (36PK/BX)"

## (undated) DEVICE — STOPCOCK MALE 4-WAY - (50/CA)

## (undated) DEVICE — SUTURE PLEDGETS 9 X 4MM

## (undated) DEVICE — TUBE CHEST 32FR. STRAIGHT - (10EA/CA)

## (undated) DEVICE — TROCAR5X55 KII SHIELDED SYS - (6/BX)

## (undated) DEVICE — PACK CV DRAPING/BASIN 2PART - (1/CA)

## (undated) DEVICE — DRAIN CHEST ADULT (6EA/CA) DELETED ITEM  ORDER #15909

## (undated) DEVICE — GLOVE BIOGEL INDICATOR SZ 6.5 SURGICAL PF LTX - (50PR/BX 4BX/CA)

## (undated) DEVICE — SUTURE 6-0 PROLENE RB-2 D/A 30 (36PK/BX)"

## (undated) DEVICE — INSERT STEALTH #5 - (10/BX)

## (undated) DEVICE — LEAD SET 6 DISP. EKG NIHON KOHDEN

## (undated) DEVICE — PACK LAP CHOLE OR - (2EA/CA)

## (undated) DEVICE — TRAY SURESTEP FOLEY TEMP SENSING 16FR (10EA/CA) ORDER  #18764 FOR TEMP FOLEY ONLY

## (undated) DEVICE — MASK ANESTHESIA ADULT  - (100/CA)

## (undated) DEVICE — ARMBOARD  SMALL IV 9 INLONG - (25EA/CA)

## (undated) DEVICE — PADDING CAST 4 IN STERILE - 4 X 4 YDS (24/CA)

## (undated) DEVICE — PACK E SUTURE USED FOR - OPEN HEART  (5/BX)

## (undated) DEVICE — BAG RESUSCITATION DISPOSABLE - WITH MASK (10 EA/CA)

## (undated) DEVICE — KIT CATHETERIZATION TWO-LUMEN VENOUS 8FR (5EA/CA)

## (undated) DEVICE — QUICKLOADS COR-KNOT TITANIUM - (6EA/PK 12PK/BX)

## (undated) DEVICE — BLADE STERNUM SAW SURGICAL 32.0 X 6.4 MM STERILE (1/EA)

## (undated) DEVICE — SET FLUID WARMING STANDARD FLOW - (10/CA)

## (undated) DEVICE — BONE WAX (12PK/BX)

## (undated) DEVICE — GLOVE BIOGEL PI ORTHO SZ 7 PF LF (40PR/BX)

## (undated) DEVICE — TROCAR 5X100 BLADED Z-THREAD - KII (6/BX)

## (undated) DEVICE — ELECTRODE DUAL RETURN W/ CORD - (50/PK)

## (undated) DEVICE — TUBE CONNECT SUCTION CLEAR 120 X 1/4" (50EA/CA)"

## (undated) DEVICE — CANNULA W/SEAL 5X100 Z-THRE - ADED KII (12/BX)

## (undated) DEVICE — ADHESIVE DERMABOND HVD MINI (12EA/BX)

## (undated) DEVICE — POLY UMBILICAL TAPE 1/8X30 - (36/BX)

## (undated) DEVICE — SUTURE 2-0 ETHIBOND V-5 (36PK/BX)

## (undated) DEVICE — SUTURE ETHILON 2-0 FSLX 30 (36PK/BX)"

## (undated) DEVICE — GLOVE BIOGEL SZ 7.5 SURGICAL PF LTX - (50PR/BX 4BX/CA)

## (undated) DEVICE — SET TUBING PNEUMOCLEAR HIGH FLOW SMOKE EVACUATION (10EA/BX)

## (undated) DEVICE — SUTURE 2-0 VICRYL PLUS CT-1 - 8 X 18 INCH(12/BX)

## (undated) DEVICE — GOWN SURGEONS X-LARGE - DISP. (30/CA)

## (undated) DEVICE — BLADE SURGICAL #15 - (50/BX 3BX/CA)

## (undated) DEVICE — SUTURE 7-0 PROLENE BV-1 D/A 30 (36PK/BX)"

## (undated) DEVICE — MAT PATIENT POSITIONING PREVALON (10EA/CA)

## (undated) DEVICE — INSERT STEALTH #3 - (10/BX)

## (undated) DEVICE — CHLORAPREP 26 ML APPLICATOR - ORANGE TINT(25/CA)

## (undated) DEVICE — BLADE SURGICAL CLIPPER - (50EA/CA)

## (undated) DEVICE — KIT RADIAL ARTERY 20GA W/MAX BARRIER AND BIOPATCH  (5EA/CA) #10740 IS FOR THE SET RADIAL ARTERIAL

## (undated) DEVICE — SUTURE OHS

## (undated) DEVICE — SUTURE 2-0 ETHIBOND V-5 - (6/BX)

## (undated) DEVICE — TROCARCANN&SEAL 5X55 ZTHREAD - 12/BX

## (undated) DEVICE — BANDAGE ELASTIC 4 HONEYCOMB - 4"X5YD LF (20/CA)"

## (undated) DEVICE — TOWELS CLOTH SURGICAL - (4/PK 20PK/CA)

## (undated) DEVICE — SUTURE 4-0 VICRYL PLUS FS-2 - 27 INCH (36/BX)

## (undated) DEVICE — SUTURE 5-0 PROLENE RB-1 D/A 36 (36PK/BX)"

## (undated) DEVICE — KIT TOURNIQUET DLP (40EA/PK)

## (undated) DEVICE — GLOVE BIOGEL INDICATOR SZ 7.5 SURGICAL PF LTX - (50PR/BX 4BX/CA)

## (undated) DEVICE — GLOVE SZ 6 BIOGEL PI MICRO - PF LF (50PR/BX 4BX/CA)

## (undated) DEVICE — APPLIER ENDO MEDIUM CLIP (3EA/BX)

## (undated) DEVICE — SUTURE 4-0 PROLENE RB-1 D/A 36 (36PK/BX)"

## (undated) DEVICE — WIRE STEEL 5-0 B&S 20 OHS - 5/PK 12PK/BX ITEM. D5329 OR D6625 CAN BE USED AS A SUB

## (undated) DEVICE — DERMABOND ADVANCED - (12EA/BX)

## (undated) DEVICE — DRESSING TRANSPARENT FILM TEGADERM 4 X 4.75" (50EA/BX)"

## (undated) DEVICE — FIBRILLAR SURGICEL 4X4 - 10/CA

## (undated) DEVICE — GLOVE BIOGEL SZ 6.5 SURGICAL PF LTX (50PR/BX 4BX/CA)

## (undated) DEVICE — TRANSDUCER BIFURCATED MONITORING KIT (10EA/CA)